# Patient Record
Sex: FEMALE | Race: WHITE | Employment: FULL TIME | URBAN - NONMETROPOLITAN AREA
[De-identification: names, ages, dates, MRNs, and addresses within clinical notes are randomized per-mention and may not be internally consistent; named-entity substitution may affect disease eponyms.]

---

## 2017-03-14 ENCOUNTER — HOSPITAL ENCOUNTER (EMERGENCY)
Age: 62
Discharge: HOME | End: 2017-03-14
Payer: OTHER MISCELLANEOUS

## 2017-03-14 ENCOUNTER — APPOINTMENT (OUTPATIENT)
Dept: GENERAL RADIOLOGY | Age: 62
End: 2017-03-14
Payer: OTHER MISCELLANEOUS

## 2017-03-14 ENCOUNTER — APPOINTMENT (OUTPATIENT)
Dept: CT IMAGING | Age: 62
End: 2017-03-14
Payer: OTHER MISCELLANEOUS

## 2017-03-14 VITALS
SYSTOLIC BLOOD PRESSURE: 137 MMHG | OXYGEN SATURATION: 96 % | RESPIRATION RATE: 18 BRPM | HEIGHT: 61 IN | TEMPERATURE: 97.6 F | DIASTOLIC BLOOD PRESSURE: 74 MMHG | WEIGHT: 151 LBS | BODY MASS INDEX: 28.51 KG/M2 | HEART RATE: 56 BPM

## 2017-03-14 DIAGNOSIS — S20.221A CONTUSION OF RIGHT BACK WALL OF THORAX, INITIAL ENCOUNTER: ICD-10-CM

## 2017-03-14 DIAGNOSIS — S43.401A SPRAIN OF RIGHT SHOULDER, UNSPECIFIED SHOULDER SPRAIN TYPE, INITIAL ENCOUNTER: Primary | ICD-10-CM

## 2017-03-14 DIAGNOSIS — S73.101A HIP SPRAIN, RIGHT, INITIAL ENCOUNTER: ICD-10-CM
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
## 2017-03-14 PROCEDURE — 72125 CT NECK SPINE W/O DYE: CPT

## 2017-03-14 PROCEDURE — 99284 EMERGENCY DEPT VISIT MOD MDM: CPT

## 2017-03-14 PROCEDURE — 6370000000 HC RX 637 (ALT 250 FOR IP): Performed by: NURSE PRACTITIONER

## 2017-03-14 PROCEDURE — 72072 X-RAY EXAM THORAC SPINE 3VWS: CPT

## 2017-03-14 PROCEDURE — 73030 X-RAY EXAM OF SHOULDER: CPT

## 2017-03-14 PROCEDURE — 73010 X-RAY EXAM OF SHOULDER BLADE: CPT

## 2017-03-14 PROCEDURE — 73502 X-RAY EXAM HIP UNI 2-3 VIEWS: CPT

## 2017-03-14 RX ORDER — NAPROXEN SODIUM 550 MG/1
550 TABLET ORAL ONCE
Status: COMPLETED | OUTPATIENT
Start: 2017-03-14 | End: 2017-03-14

## 2017-03-14 RX ORDER — NAPROXEN SODIUM 550 MG/1
550 TABLET ORAL 2 TIMES DAILY WITH MEALS
Qty: 10 TABLET | Refills: 0 | Status: SHIPPED | OUTPATIENT
Start: 2017-03-14 | End: 2021-04-01

## 2017-03-14 RX ORDER — CYCLOBENZAPRINE HCL 10 MG
10 TABLET ORAL ONCE
Status: COMPLETED | OUTPATIENT
Start: 2017-03-14 | End: 2017-03-14

## 2017-03-14 RX ORDER — CYCLOBENZAPRINE HCL 10 MG
10 TABLET ORAL 3 TIMES DAILY PRN
Qty: 15 TABLET | Refills: 0 | Status: SHIPPED | OUTPATIENT
Start: 2017-03-14 | End: 2017-03-19

## 2017-03-14 RX ADMIN — NAPROXEN SODIUM 550 MG: 550 TABLET ORAL at 21:16

## 2017-03-14 RX ADMIN — CYCLOBENZAPRINE HYDROCHLORIDE 10 MG: 10 TABLET, FILM COATED ORAL at 21:15

## 2017-03-14 ASSESSMENT — PAIN SCALES - GENERAL
PAINLEVEL_OUTOF10: 3
PAINLEVEL_OUTOF10: 2
PAINLEVEL_OUTOF10: 2

## 2017-03-14 ASSESSMENT — PAIN DESCRIPTION - INTENSITY
RATING_2: 4
RATING_3: 2

## 2017-03-14 ASSESSMENT — PAIN DESCRIPTION - LOCATION: LOCATION: HIP

## 2017-03-14 ASSESSMENT — PAIN DESCRIPTION - ORIENTATION: ORIENTATION: RIGHT

## 2017-03-14 ASSESSMENT — PAIN DESCRIPTION - PAIN TYPE: TYPE: ACUTE PAIN

## 2017-03-17 ASSESSMENT — ENCOUNTER SYMPTOMS
CONTUSION: 0
SHORTNESS OF BREATH: 0
ABDOMINAL PAIN: 0
BACK PAIN: 1
NAUSEA: 0
VOMITING: 0

## 2017-03-21 ENCOUNTER — HOSPITAL ENCOUNTER (OUTPATIENT)
Dept: GENERAL RADIOLOGY | Age: 62
Discharge: HOME | End: 2017-03-21
Payer: OTHER MISCELLANEOUS

## 2017-03-21 ENCOUNTER — HOSPITAL ENCOUNTER (OUTPATIENT)
Age: 62
Discharge: HOME | End: 2017-03-21
Payer: OTHER MISCELLANEOUS

## 2017-03-21 DIAGNOSIS — S50.01XA CONTUSION OF RIGHT ELBOW, INITIAL ENCOUNTER: ICD-10-CM

## 2017-03-21 DIAGNOSIS — M25.531 WRIST PAIN, ACUTE, RIGHT: ICD-10-CM

## 2017-03-21 DIAGNOSIS — M79.641 PAIN OF RIGHT HAND: ICD-10-CM

## 2017-03-21 PROCEDURE — 73080 X-RAY EXAM OF ELBOW: CPT

## 2017-03-21 PROCEDURE — 73110 X-RAY EXAM OF WRIST: CPT

## 2017-03-21 PROCEDURE — 73130 X-RAY EXAM OF HAND: CPT

## 2018-03-06 ENCOUNTER — HOSPITAL ENCOUNTER (OUTPATIENT)
Dept: WOMENS IMAGING | Age: 63
Discharge: HOME | End: 2018-03-08
Payer: COMMERCIAL

## 2018-03-06 DIAGNOSIS — Z12.31 SCREENING MAMMOGRAM, ENCOUNTER FOR: ICD-10-CM

## 2018-03-06 PROCEDURE — 77067 SCR MAMMO BI INCL CAD: CPT

## 2018-06-04 ENCOUNTER — HOSPITAL ENCOUNTER (OUTPATIENT)
Age: 63
Discharge: HOME | End: 2018-06-04
Payer: COMMERCIAL

## 2018-06-04 DIAGNOSIS — R30.0 BURNING WITH URINATION: ICD-10-CM

## 2018-06-04 PROCEDURE — 87086 URINE CULTURE/COLONY COUNT: CPT

## 2018-06-05 LAB
CULTURE: NORMAL
CULTURE: NORMAL
Lab: NORMAL
SPECIMEN DESCRIPTION: NORMAL
SPECIMEN DESCRIPTION: NORMAL
STATUS: NORMAL

## 2019-11-19 ENCOUNTER — HOSPITAL ENCOUNTER (OUTPATIENT)
Age: 64
Discharge: HOME | End: 2019-11-21
Payer: COMMERCIAL

## 2019-11-19 ENCOUNTER — HOSPITAL ENCOUNTER (OUTPATIENT)
Dept: GENERAL RADIOLOGY | Age: 64
Discharge: HOME | End: 2019-11-21
Payer: COMMERCIAL

## 2019-11-19 DIAGNOSIS — J20.9 ACUTE BRONCHITIS, UNSPECIFIED ORGANISM: ICD-10-CM

## 2019-11-19 PROCEDURE — 71046 X-RAY EXAM CHEST 2 VIEWS: CPT

## 2019-12-14 PROBLEM — Z12.11 COLON CANCER SCREENING: Status: ACTIVE | Noted: 2019-12-14

## 2020-01-13 PROBLEM — Z12.11 COLON CANCER SCREENING: Status: RESOLVED | Noted: 2019-12-14 | Resolved: 2020-01-13

## 2020-10-29 ENCOUNTER — HOSPITAL ENCOUNTER (OUTPATIENT)
Age: 65
Discharge: HOME | End: 2020-10-29
Payer: MEDICARE

## 2020-10-29 LAB
ALBUMIN SERPL-MCNC: 4.8 G/DL (ref 3.5–5.2)
ALBUMIN/GLOBULIN RATIO: 2.2 (ref 1–2.5)
ALP BLD-CCNC: 67 U/L (ref 35–104)
ALT SERPL-CCNC: 38 U/L (ref 5–33)
ANION GAP SERPL CALCULATED.3IONS-SCNC: 11 MMOL/L (ref 9–17)
AST SERPL-CCNC: 25 U/L
BILIRUB SERPL-MCNC: 0.55 MG/DL (ref 0.3–1.2)
BUN BLDV-MCNC: 20 MG/DL (ref 8–23)
BUN/CREAT BLD: 25 (ref 9–20)
CALCIUM SERPL-MCNC: 9.7 MG/DL (ref 8.6–10.4)
CHLORIDE BLD-SCNC: 99 MMOL/L (ref 98–107)
CHOLESTEROL/HDL RATIO: 4.3
CHOLESTEROL: 188 MG/DL
CO2: 28 MMOL/L (ref 20–31)
CREAT SERPL-MCNC: 0.8 MG/DL (ref 0.5–0.9)
ESTIMATED AVERAGE GLUCOSE: 128 MG/DL
GFR AFRICAN AMERICAN: >60 ML/MIN
GFR NON-AFRICAN AMERICAN: >60 ML/MIN
GFR SERPL CREATININE-BSD FRML MDRD: ABNORMAL ML/MIN/{1.73_M2}
GFR SERPL CREATININE-BSD FRML MDRD: ABNORMAL ML/MIN/{1.73_M2}
GLUCOSE BLD-MCNC: 104 MG/DL (ref 70–99)
HBA1C MFR BLD: 6.1 % (ref 4–6)
HCT VFR BLD CALC: 42.4 % (ref 36.3–47.1)
HDLC SERPL-MCNC: 44 MG/DL
HEMOGLOBIN: 14.2 G/DL (ref 11.9–15.1)
LDL CHOLESTEROL: 110 MG/DL (ref 0–130)
MCH RBC QN AUTO: 29.5 PG (ref 25.2–33.5)
MCHC RBC AUTO-ENTMCNC: 33.5 G/DL (ref 28.4–34.8)
MCV RBC AUTO: 88 FL (ref 82.6–102.9)
NRBC AUTOMATED: 0 PER 100 WBC
PDW BLD-RTO: 12.2 % (ref 11.8–14.4)
PLATELET # BLD: 206 K/UL (ref 138–453)
PMV BLD AUTO: 11 FL (ref 8.1–13.5)
POTASSIUM SERPL-SCNC: 4.3 MMOL/L (ref 3.7–5.3)
RBC # BLD: 4.82 M/UL (ref 3.95–5.11)
SODIUM BLD-SCNC: 138 MMOL/L (ref 135–144)
TOTAL PROTEIN: 7 G/DL (ref 6.4–8.3)
TRIGL SERPL-MCNC: 169 MG/DL
VLDLC SERPL CALC-MCNC: ABNORMAL MG/DL (ref 1–30)
WBC # BLD: 6.3 K/UL (ref 3.5–11.3)

## 2020-10-29 PROCEDURE — 36415 COLL VENOUS BLD VENIPUNCTURE: CPT

## 2020-10-29 PROCEDURE — 80061 LIPID PANEL: CPT

## 2020-10-29 PROCEDURE — 80053 COMPREHEN METABOLIC PANEL: CPT

## 2020-10-29 PROCEDURE — 83036 HEMOGLOBIN GLYCOSYLATED A1C: CPT

## 2020-10-29 PROCEDURE — 85027 COMPLETE CBC AUTOMATED: CPT

## 2020-11-19 ENCOUNTER — OFFICE VISIT (OUTPATIENT)
Dept: PULMONOLOGY | Age: 65
End: 2020-11-19
Payer: MEDICARE

## 2020-11-19 VITALS
BODY MASS INDEX: 29.62 KG/M2 | HEART RATE: 55 BPM | SYSTOLIC BLOOD PRESSURE: 136 MMHG | HEIGHT: 61 IN | OXYGEN SATURATION: 99 % | DIASTOLIC BLOOD PRESSURE: 72 MMHG | TEMPERATURE: 97.9 F | WEIGHT: 156.9 LBS | RESPIRATION RATE: 20 BRPM

## 2020-11-19 PROCEDURE — 1036F TOBACCO NON-USER: CPT | Performed by: INTERNAL MEDICINE

## 2020-11-19 PROCEDURE — G8427 DOCREV CUR MEDS BY ELIG CLIN: HCPCS | Performed by: INTERNAL MEDICINE

## 2020-11-19 PROCEDURE — G8484 FLU IMMUNIZE NO ADMIN: HCPCS | Performed by: INTERNAL MEDICINE

## 2020-11-19 PROCEDURE — G8400 PT W/DXA NO RESULTS DOC: HCPCS | Performed by: INTERNAL MEDICINE

## 2020-11-19 PROCEDURE — 99212 OFFICE O/P EST SF 10 MIN: CPT

## 2020-11-19 PROCEDURE — 3017F COLORECTAL CA SCREEN DOC REV: CPT | Performed by: INTERNAL MEDICINE

## 2020-11-19 PROCEDURE — 4040F PNEUMOC VAC/ADMIN/RCVD: CPT | Performed by: INTERNAL MEDICINE

## 2020-11-19 PROCEDURE — 1123F ACP DISCUSS/DSCN MKR DOCD: CPT | Performed by: INTERNAL MEDICINE

## 2020-11-19 PROCEDURE — 1090F PRES/ABSN URINE INCON ASSESS: CPT | Performed by: INTERNAL MEDICINE

## 2020-11-19 PROCEDURE — G8417 CALC BMI ABV UP PARAM F/U: HCPCS | Performed by: INTERNAL MEDICINE

## 2020-11-19 PROCEDURE — 99203 OFFICE O/P NEW LOW 30 MIN: CPT | Performed by: INTERNAL MEDICINE

## 2020-11-19 NOTE — PROGRESS NOTES
OUTPATIENT PULMONARY CONSULT NOTE      Patient:  Don Miranda  MRN: [id number]    Consulting Physician: Dr Shaheed Brunner  Reason for Consult: FABIEN  Primacy Care Physician: JUAN Cox CNP    HISTORY OF PRESENT ILLNESS:   The patient is a 72 y.o. female     She was diagnosed with obstructive sleep apnea approximately 10 years ago when she had been followed by Dr. Mahad Joaquin. Her last sleep study approximately was that time and she has been using CPAP almost 90% of the time according to patient no sleep data is available no sleep studies available no compliance data is available. According to patient she use CPAP with nasal pillows. She had history of snoring she was noted to have desaturation while she was in the hospital and she had awakening with a snoring and feeling of tiredness and fatigue during the daytime and that however she had a sleep study done and was diagnosed with sleep apnea. According to patient with a CPAP also she still feels like that she is tired and fatigue morning sleep is not safe refreshing, she does still wake up with a snoring and her  told her that she still have episodes of apnea she does not take nap during the daytime and usually she does not doze off during the daytime. She denies shortness of breath, wheezing chest tightness. She denies history of asthma or COPD. She is a non-smoker history of smoking only for 4 years remote history. Sleep questionnaire on CPAP  Snores at night, wakes herself snoring. Has witnessed apnea. Wakes up with choking and gasping sensation. Positive dry mouth upon awakening. Positive fatigue and tiredness during the day. Positive history of sleep apnea. Goes to sleep at 9-10 pm, wakes up 6 30 am. It takes 15 minutes to fall asleep. Wakes up 0-1 times at night to go to bathroom. Takes no nap during the day. No headache in am. No car wrecks or near wrecks because of the sleepiness. No nodding off while driving. No weight gain. Positive forgetfulness or decreased concentration. No nasal congestion or obstruction at night. No significant caffienated drinks or alcohol. Using CPAP 8 hr/night. No leg jerks during sleep. No restless feelings in legs at night. No numbness or burning in leg or feet. No leg aches or cramps . No loss of muscle strength when angry or laugh. No hallucination when dozing off or waking up from sleep. No paralysis upon awakening from sleep or going to sleep. No teeth grinding, nightmares, sleep walking. No night time panic attacks. No flowsheet data found. ESS:   Sitting and reading 1  Watching TV 1  Sitting inactive in a public place (e.g a theater or a meeting) 0  As a passenger in a car for an hour without a break 1  Lying down to rest in the afternoon when circumstances permit 1  Sitting and talking to someone 0  Sitting quietly after a lunch without alcohol 1  In a car, while stopped for a few minutes in traffic0    Past Medical History:        Diagnosis Date    Arthritis     Depression     Hyperlipidemia     Hypertension     Other and unspecified hyperlipidemia     Sleep apnea     Unspecified acute reaction to stress     Unspecified essential hypertension        Past Surgical History:        Procedure Laterality Date    HYSTERECTOMY      TUBAL LIGATION         Allergies:     Allergies   Allergen Reactions    Keflex [Cephalexin]      RASH    Sulfa Antibiotics          Home Meds:   Outpatient Encounter Medications as of 11/19/2020   Medication Sig Dispense Refill    hydroCHLOROthiazide (HYDRODIURIL) 50 MG tablet TAKE 1 TABLET DAILY 90 tablet 3    atenolol (TENORMIN) 25 MG tablet TAKE 1 TABLET DAILY 90 tablet 3    citalopram (CELEXA) 20 MG tablet TAKE 1 TABLET ONCE DAILY 90 tablet 3    omeprazole (PRILOSEC) 20 MG delayed release capsule TAKE 1 CAPSULE DAILY 90 capsule 3    simvastatin (ZOCOR) 40 MG tablet TAKE 1 TABLET EVERY EVENING 90 tablet 3    naproxen sodium (ANAPROX DS) 550 MG tablet Take 1 tablet by mouth 2 times daily (with meals) for 5 days (Patient taking differently: Take 550 mg by mouth 2 times daily as needed ) 10 tablet 0     No facility-administered encounter medications on file as of 11/19/2020. Social History:   TOBACCO:   reports that she quit smoking about 38 years ago. Her smoking use included cigarettes. She has a 6.00 pack-year smoking history. She has never used smokeless tobacco.  ETOH:   reports current alcohol use of about 4.0 standard drinks of alcohol per week.   OCCUPATION:      Family History:       Problem Relation Age of Onset    Other Mother         MANTEL CELL LYMPHOMA    Asthma Mother     Heart Attack Mother     High Blood Pressure Mother     High Cholesterol Mother     Stroke Mother     Thyroid Disease Mother     Kidney Disease Mother     Glaucoma Mother     Cancer Mother     Cancer Father         LUNG    Cancer Sister        Immunizations:    Immunization History   Administered Date(s) Administered    Td, unspecified formulation 11/29/2012         REVIEW OF SYSTEMS:  CONSTITUTIONAL:  negative for  fevers, chills, sweats, fatigue, anorexia, and weight loss  EYES:  negative for  double vision, blurred vision, dry eyes, eye discharge, visual disturbance, redness, and icterus  HEENT:  negative for  hearing loss, tinnitus, ear drainage, earaches, nasal congestion, epistaxis, sore throat, hoarseness, voice change, and postnasal drip  RESPIRATORY:  negative for  dry cough, cough with sputum, dyspnea, wheezing, hemoptysis, chest pain, and pleuritic pain  CARDIOVASCULAR:  negative for  chest pain, dyspnea, palpitations, orthopnea, PND, exertional chest pressure/discomfort, fatigue, edema, syncope  GASTROINTESTINAL:  negative for nausea, vomiting, diarrhea, constipation, abdominal pain, abdominal mass, abdominal distention, jaundice, dysphagia, reflux, odynophagia, hematemesis, and hemtochezia  GENITOURINARY:  negative for frequency, percussion, air entry is present bilaterally and symmetrical, no expiratory wheezing rhonchi or crackles.   Heart - normal rate, regular rhythm, normal S1, S2, no murmurs, rubs, clicks or gallops  Abdomen - soft, nontender, nondistended, no masses or organomegaly  Neurological - alert, oriented, normal speech, no focal findings or movement disorder noted}  Extremities - peripheral pulses normal, no pedal edema, no clubbing or cyanosis  Skin - normal coloration and turgor, no rashes, no suspicious skin lesions noted       LABS:    CBC:   WBC   Date Value Ref Range Status   10/29/2020 6.3 3.5 - 11.3 k/uL Final   12/26/2012 7.6 3.5 - 11.0 k/uL Final     Hemoglobin   Date Value Ref Range Status   10/29/2020 14.2 11.9 - 15.1 g/dL Final   12/26/2012 13.9 12.0 - 16.0 g/dL Final     Platelets   Date Value Ref Range Status   10/29/2020 206 138 - 453 k/uL Final   12/26/2012 208 140 - 450 k/uL Final     BMP:   Sodium   Date Value Ref Range Status   10/29/2020 138 135 - 144 mmol/L Final     Potassium   Date Value Ref Range Status   10/29/2020 4.3 3.7 - 5.3 mmol/L Final     Chloride   Date Value Ref Range Status   10/29/2020 99 98 - 107 mmol/L Final     CO2   Date Value Ref Range Status   10/29/2020 28 20 - 31 mmol/L Final     BUN   Date Value Ref Range Status   10/29/2020 20 8 - 23 mg/dL Final     CREATININE   Date Value Ref Range Status   10/29/2020 0.80 0.50 - 0.90 mg/dL Final     Glucose   Date Value Ref Range Status   10/29/2020 104 (H) 70 - 99 mg/dL Final   06/26/2018 95 70 - 99 mg/dL Final     Comment:         DIAGNOSTIC THRESHOLDS FOR DIABETES AND IMPAIRED FASTING GLUCOSE (IFG)                                        FASTING PLASMA GLUCOSE                NORMAL                       <100     mg/dL                IFG                          100-125  mg/dL                DIABETES                     >/= 126  mg/dL                GESTATIONAL DIABETES         >/= 92   mg/dL  Pathology [address] [facility], [address]  : HARSHA Loza Draft No. [id number]   CAP Accreditation No. [id number]     05/30/2017 98 70 - 100 mg/dl Final     Comment:           DIAGNOSTIC THRESHOLDS FOR DIABETES AND IMPAIRED FASTING GLUCOSE (IFG)                                        FASTING PLASMA GLUCOSE                NORMAL                       <100     MG/DL                IFG                          100-125  MG/DL                DIABETES                     >/= 126  MG/DL  Pathology [address] [place] [address]  : Eric Jones M.D.  CLIA No. [id number]   CAP Accreditation No. [id number]       Hepatic:   AST   Date Value Ref Range Status   10/29/2020 25 <32 U/L Final   12/17/2019 21 0 - 41 U/L Final     Comment:     Performed at [facility]. [facility]  [address] [place] [id number]     11/07/2018 20 9 - 40 U/L Final     ALT   Date Value Ref Range Status   10/29/2020 38 (H) 5 - 33 U/L Final   12/17/2019 25 0 - 31 U/L Final     Comment:     Performed at [facility]. [facility]  [address] [place] [id number]  Pathology [address] [place] [address]  CLIA No. [id number]   CAP Accreditation No. [id number]  : Anna Juarez. James Katz M.D.     11/07/2018 27 5 - 40 U/L Final     Comment:     Pathology [address] [place] [address]  : Gonzalo Juarez.  AHRSHA Griffin Draft No. [id number]   CAP Accreditation No. [id number]       Total Bilirubin   Date Value Ref Range Status   10/29/2020 0.55 0.3 - 1.2 mg/dL Final     Alkaline Phosphatase   Date Value Ref Range Status   10/29/2020 67 35 - 104 U/L Final     Amylase: No results found for: AMYLASE  Lipase: No results found for: LIPASE  CARDIAC ENZYMES: No results found for: CKTOTAL, CKMB, CKMBINDEX, TROPONINI  BNP: No results found for: BNP  Lipids:   Cholesterol   Date Value Ref Range Status   10/29/2020 188 <200 mg/dL Final Comment:        Cholesterol Guidelines:      <200  Desirable   200-240  Borderline      >240  Undesirable          HDL   Date Value Ref Range Status   10/29/2020 44 >40 mg/dL Final     Comment:        HDL Guidelines:    <40     Undesirable   40-59    Borderline    >59     Desirable            INR: No results found for: INR  Thyroid: No results found for: TSH  Urinalysis:   Blood, Urine   Date Value Ref Range Status   06/04/2018 Positive  Final     Comment:     moderate     Color, UA   Date Value Ref Range Status   06/04/2018 Light Yellow  Final     pH, UA   Date Value Ref Range Status   06/04/2018 7.0 4.5 - 8.0 Final     Protein, UA   Date Value Ref Range Status   06/04/2018 Negative Negative Final     Specific Gravity, UA   Date Value Ref Range Status   06/04/2018 1.005 1.005 - 1.030 Final     Bilirubin Urine   Date Value Ref Range Status   06/04/2018 0 mg/dL Final     Nitrite, Urine   Date Value Ref Range Status   06/04/2018 Negative  Final     Leukocytes, UA   Date Value Ref Range Status   06/04/2018 large  Final     Glucose, Ur   Date Value Ref Range Status   06/04/2018 neg  Final     Cultures:-  -----------------------------------------------------------------    ABGs: No results found for: PHART, PO2ART, [id number]    Pulmonary Functions Testing Results:    No results found for: FEV1, FVC, YAE6OAK, TLC, DLCO        Assessment and Plan       ICD-10-CM    1. FABIEN on CPAP  G47.33     Z99.89    2. Essential hypertension  I10              Sleep study baseline/titration study possibly a split-night study. We will give her a prescription for CPAP supplies to continue using CPAP but she has at this time.     Continue current CPAP at least 4 hrs qhs  Wt loss is recommended and discussed  Follow good sleep hygeine instructions  Use humidifier  Questions answered pertaining to diagnosis and management explained importance of compliance with therapy   Compliance data not available  Need compliance data before next

## 2020-12-31 ENCOUNTER — HOSPITAL ENCOUNTER (OUTPATIENT)
Dept: LAB | Age: 65
Setting detail: SPECIMEN
Discharge: HOME | End: 2020-12-31
Payer: MEDICARE

## 2020-12-31 DIAGNOSIS — Z01.818 PREOPERATIVE TESTING: ICD-10-CM

## 2020-12-31 PROCEDURE — C9803 HOPD COVID-19 SPEC COLLECT: HCPCS

## 2020-12-31 PROCEDURE — U0003 INFECTIOUS AGENT DETECTION BY NUCLEIC ACID (DNA OR RNA); SEVERE ACUTE RESPIRATORY SYNDROME CORONAVIRUS 2 (SARS-COV-2) (CORONAVIRUS DISEASE [COVID-19]), AMPLIFIED PROBE TECHNIQUE, MAKING USE OF HIGH THROUGHPUT TECHNOLOGIES AS DESCRIBED BY CMS-2020-01-R: HCPCS

## 2021-01-03 LAB
SARS-COV-2, RAPID: NORMAL
SARS-COV-2: NORMAL
SARS-COV-2: NOT DETECTED
SOURCE: NORMAL

## 2021-01-05 ENCOUNTER — HOSPITAL ENCOUNTER (OUTPATIENT)
Dept: WOMENS IMAGING | Age: 66
Discharge: HOME | End: 2021-01-07
Payer: MEDICARE

## 2021-01-05 DIAGNOSIS — Z12.31 ENCOUNTER FOR SCREENING MAMMOGRAM FOR MALIGNANT NEOPLASM OF BREAST: ICD-10-CM

## 2021-01-05 PROCEDURE — 77063 BREAST TOMOSYNTHESIS BI: CPT

## 2021-01-06 ENCOUNTER — HOSPITAL ENCOUNTER (OUTPATIENT)
Dept: SLEEP CENTER | Age: 66
Discharge: HOME | End: 2021-01-06
Payer: MEDICARE

## 2021-01-06 DIAGNOSIS — G47.33 OSA ON CPAP: ICD-10-CM

## 2021-01-06 DIAGNOSIS — Z99.89 OSA ON CPAP: ICD-10-CM

## 2021-01-06 PROCEDURE — 95811 POLYSOM 6/>YRS CPAP 4/> PARM: CPT

## 2021-01-08 LAB — STATUS: NORMAL

## 2021-04-01 ENCOUNTER — OFFICE VISIT (OUTPATIENT)
Dept: PULMONOLOGY | Age: 66
End: 2021-04-01
Payer: MEDICARE

## 2021-04-01 VITALS
DIASTOLIC BLOOD PRESSURE: 82 MMHG | BODY MASS INDEX: 29.85 KG/M2 | HEART RATE: 64 BPM | TEMPERATURE: 97.3 F | OXYGEN SATURATION: 96 % | WEIGHT: 158.1 LBS | RESPIRATION RATE: 20 BRPM | HEIGHT: 61 IN | SYSTOLIC BLOOD PRESSURE: 123 MMHG

## 2021-04-01 DIAGNOSIS — I10 ESSENTIAL HYPERTENSION: ICD-10-CM

## 2021-04-01 DIAGNOSIS — G47.33 OSA ON CPAP: Primary | ICD-10-CM

## 2021-04-01 DIAGNOSIS — Z99.89 OSA ON CPAP: Primary | ICD-10-CM

## 2021-04-01 PROCEDURE — G8400 PT W/DXA NO RESULTS DOC: HCPCS | Performed by: INTERNAL MEDICINE

## 2021-04-01 PROCEDURE — 99213 OFFICE O/P EST LOW 20 MIN: CPT | Performed by: INTERNAL MEDICINE

## 2021-04-01 PROCEDURE — G8427 DOCREV CUR MEDS BY ELIG CLIN: HCPCS | Performed by: INTERNAL MEDICINE

## 2021-04-01 PROCEDURE — 4040F PNEUMOC VAC/ADMIN/RCVD: CPT | Performed by: INTERNAL MEDICINE

## 2021-04-01 PROCEDURE — 1090F PRES/ABSN URINE INCON ASSESS: CPT | Performed by: INTERNAL MEDICINE

## 2021-04-01 PROCEDURE — 1036F TOBACCO NON-USER: CPT | Performed by: INTERNAL MEDICINE

## 2021-04-01 PROCEDURE — 1123F ACP DISCUSS/DSCN MKR DOCD: CPT | Performed by: INTERNAL MEDICINE

## 2021-04-01 PROCEDURE — 3017F COLORECTAL CA SCREEN DOC REV: CPT | Performed by: INTERNAL MEDICINE

## 2021-04-01 PROCEDURE — G8417 CALC BMI ABV UP PARAM F/U: HCPCS | Performed by: INTERNAL MEDICINE

## 2021-04-01 NOTE — PROGRESS NOTES
OUTPATIENT PULMONARY PROGRESS NOTE      Patient:  David Villagran  MRN: [id number]    Consulting Physician: Dr Holley Bell  Reason for Consult: FABIEN  Primacy Care Physician: JUAN Warren CNP    HISTORY OF PRESENT ILLNESS:   The patient is a 72 y.o. female for follow-up of sleep apnea    She is here for follow-up of sleep apnea. She was seen for the first time 3 months ago. She was scheduled to have a repeat sleep study done. She had a split-night study done she had an AHI of little over 14 and she was titrated to a CPAP of 6 cm during the split-night study. According to patient she has problem adjusting the nasal pillows she has 2 different kind of nasal pillows she sleeps on side when she sleeps on the side sleeps and she is problem adjusting to sleep. She does not think that she is getting good sleep although she does not wake up a lot but when she wake up in the morning she does feel tired. She also think that pressure is too low and she does not feel like pressure going. She does not otherwise feel tired during the daytime. She does not take nap during the daytime. She was still noted some time by her  to have snoring but denies waking up with snoring gasping or choking. Compliance data is available from 01/30/2021 to 02/28/2021. Compliance is 100% for 30 days. Average usage is 8 hours and 45 minutes and average AHI is 3.3 on CPAP of 6 cm. Sleep questionnaire on CPAP on 04/01/2021  Occasional snoring at night, does not wakes herself snoring. Has no witnessed apnea. Does wakes up with choking and gasping sensation. Positive dry mouth upon awakening. Negative fatigue and tiredness during the day. Positive history of sleep apnea. Goes to sleep at 10 pm, wakes up 630 am. It takes 15 to 30 minutes to fall asleep. Does not wake up at night to go to bathroom. Takes no nap during the day. No headache in am. No car wrecks or near wrecks because of the sleepiness.  No nodding off while driving. No weight gain. Positive forgetfulness or decreased concentration. No nasal congestion or obstruction at night. No significant caffienated drinks or alcohol. Using CPAP 8 hr/night. No leg jerks during sleep. No restless feelings in legs at night. No numbness or burning in leg or feet. No leg aches or cramps . Initial history and evaluation  She was diagnosed with obstructive sleep apnea approximately 10 years ago when she had been followed by Dr. Sarai Castillo. Her last sleep study approximately was that time and she has been using CPAP almost 90% of the time according to patient no sleep data is available no sleep studies available no compliance data is available. According to patient she use CPAP with nasal pillows. She had history of snoring she was noted to have desaturation while she was in the hospital and she had awakening with a snoring and feeling of tiredness and fatigue during the daytime and that however she had a sleep study done and was diagnosed with sleep apnea. According to patient with a CPAP also she still feels like that she is tired and fatigue morning sleep is not safe refreshing, she does still wake up with a snoring and her  told her that she still have episodes of apnea she does not take nap during the daytime and usually she does not doze off during the daytime. She denies shortness of breath, wheezing chest tightness. She denies history of asthma or COPD. She is a non-smoker history of smoking only for 4 years remote history. No flowsheet data found.     ESS: 6  Sitting and reading 1  Watching TV 2  Sitting inactive in a public place (e.g a theater or a meeting) 0  As a passenger in a car for an hour without a break 3  Lying down to rest in the afternoon when circumstances permit 0  Sitting and talking to someone 0  Sitting quietly after a lunch without alcohol 0  In a car, while stopped for a few minutes in traffic0    Past Medical History:        Diagnosis Date    Arthritis     Depression     Hyperlipidemia     Hypertension     Other and unspecified hyperlipidemia     Sleep apnea     Unspecified acute reaction to stress     Unspecified essential hypertension        Past Surgical History:        Procedure Laterality Date    HYSTERECTOMY      TUBAL LIGATION         Allergies: Allergies   Allergen Reactions    Keflex [Cephalexin]      RASH    Sulfa Antibiotics          Home Meds:   Outpatient Encounter Medications as of 4/1/2021   Medication Sig Dispense Refill    hydroCHLOROthiazide (HYDRODIURIL) 50 MG tablet TAKE 1 TABLET DAILY 90 tablet 3    atenolol (TENORMIN) 25 MG tablet TAKE 1 TABLET DAILY 90 tablet 3    citalopram (CELEXA) 20 MG tablet TAKE 1 TABLET ONCE DAILY 90 tablet 3    omeprazole (PRILOSEC) 20 MG delayed release capsule TAKE 1 CAPSULE DAILY 90 capsule 3    simvastatin (ZOCOR) 40 MG tablet TAKE 1 TABLET EVERY EVENING 90 tablet 3    naproxen sodium (ANAPROX DS) 550 MG tablet Take 1 tablet by mouth 2 times daily (with meals) for 5 days (Patient not taking: Reported on 4/1/2021) 10 tablet 0     No facility-administered encounter medications on file as of 4/1/2021. Social History:   TOBACCO:   reports that she quit smoking about 39 years ago. Her smoking use included cigarettes. She has a 6.00 pack-year smoking history. She has never used smokeless tobacco.  ETOH:   reports current alcohol use of about 4.0 standard drinks of alcohol per week.   OCCUPATION:      Family History:       Problem Relation Age of Onset    Other Mother         MANTEL CELL LYMPHOMA    Asthma Mother     Heart Attack Mother     High Blood Pressure Mother     High Cholesterol Mother    Brandi Yael Stroke Mother     Thyroid Disease Mother     Kidney Disease Mother     Glaucoma Mother     Cancer Mother     Cancer Father         LUNG    Cancer Sister        Immunizations:    Immunization History   Administered Date(s) Administered    Td, unspecified formulation 11/29/2012         REVIEW OF SYSTEMS:  CONSTITUTIONAL:  negative for  fevers, chills, sweats, fatigue, anorexia, and weight loss  EYES:  negative for  double vision, blurred vision, dry eyes, eye discharge, visual disturbance, redness, and icterus  HEENT:  negative for  hearing loss, tinnitus, ear drainage, earaches, nasal congestion, epistaxis, sore throat, hoarseness, voice change, and postnasal drip  RESPIRATORY:  negative for  dry cough, cough with sputum, dyspnea, wheezing, hemoptysis, chest pain, and pleuritic pain  CARDIOVASCULAR:  negative for  chest pain, dyspnea, palpitations, orthopnea, PND, exertional chest pressure/discomfort, fatigue, edema, syncope  GASTROINTESTINAL:  negative for nausea, vomiting, diarrhea, constipation, abdominal pain, abdominal mass, abdominal distention, jaundice, dysphagia, reflux, odynophagia, hematemesis, and hemtochezia  GENITOURINARY:  negative for frequency, dysuria, nocturia, and hematuria  HEMATOLOGIC/LYMPHATIC:  negative for easy bruising, bleeding, lymphadenopathy, and petechiae  ALLERGIC/IMMUNOLOGIC:  negative for recurrent infections, urticaria, hay fever, angioedema, anaphylaxis, and drug reactions  ENDOCRINE:  negative for heat intolerance, cold intolerance, tremor, and weight changes  MUSCULOSKELETAL:  negative for  myalgias, arthralgias, joint swelling, stiff joints, and muscle weakness  NEUROLOGICAL:  negative for headaches, dizziness, seizures, memory problems, speech problems, visual disturbance, gait problems, tremor, dysphagia, weakness, numbness, syncope, and tingling  BEHAVIOR/PSYCH:  negative for decreased sleep, decreased energy level, increased energy level, poor concentration, depressed mood, and anxiety          Physical Exam:    Vitals: /82 (Site: Left Upper Arm, Position: Sitting, Cuff Size: Medium Adult)   Pulse 64   Temp 97.3 °F (36.3 °C) (Tympanic)   Resp 20   Ht 5' 1\" (1.549 m)   Wt 158 lb 1.6 oz (71.7 kg)   SpO2 96%   BMI 29.87 kg/m²   Last 3 weights: Wt Readings from Last 3 Encounters:   04/01/21 158 lb 1.6 oz (71.7 kg)   11/19/20 156 lb 14.4 oz (71.2 kg)   10/29/20 155 lb 8 oz (70.5 kg)     Body mass index is 29.87 kg/m². Physical Examination:   General appearance - alert, well appearing, and in no distress, normal appearing weight and acyanotic, in no respiratory distress  Mental status - alert, oriented to person, place, and time  Eyes - pupils equal and reactive, extraocular eye movements intact, sclera anicteric, left eye normal, right eye normal  Ears - right ear normal, left ear normal  Nose - normal and patent, no erythema, discharge or polyps  Mouth - mucous membranes moist, pharynx normal without lesions and small oropharynx, small chin, large tongue, Mallampati 3  Neck - supple, no significant adenopathy  Chest - no tachypnea, retractions or cyanosis bilateral symmetrical chest movement, normal resonance on percussion, air entry is present bilaterally and symmetrical, no expiratory wheezing rhonchi or crackles.   Heart - normal rate, regular rhythm, normal S1, S2, no murmurs, rubs, clicks or gallops  Abdomen - soft, nontender, nondistended, no masses or organomegaly  Neurological - alert, oriented, normal speech, no focal findings or movement disorder noted}  Extremities - peripheral pulses normal, no pedal edema, no clubbing or cyanosis  Skin - normal coloration and turgor, no rashes, no suspicious skin lesions noted       LABS:    CBC:   WBC   Date Value Ref Range Status   10/29/2020 6.3 3.5 - 11.3 k/uL Final   12/26/2012 7.6 3.5 - 11.0 k/uL Final     Hemoglobin   Date Value Ref Range Status   10/29/2020 14.2 11.9 - 15.1 g/dL Final   12/26/2012 13.9 12.0 - 16.0 g/dL Final     Platelets   Date Value Ref Range Status   10/29/2020 206 138 - 453 k/uL Final   12/26/2012 208 140 - 450 k/uL Final     BMP:   Sodium   Date Value Ref Range Status   10/29/2020 138 135 - 144 mmol/L Final     Potassium   Date Value Ref Range Status   10/29/2020 4.3 3.7 - 5.3 mmol/L Final     Chloride   Date Value Ref Range Status   10/29/2020 99 98 - 107 mmol/L Final     CO2   Date Value Ref Range Status   10/29/2020 28 20 - 31 mmol/L Final     BUN   Date Value Ref Range Status   10/29/2020 20 8 - 23 mg/dL Final     CREATININE   Date Value Ref Range Status   10/29/2020 0.80 0.50 - 0.90 mg/dL Final     Glucose   Date Value Ref Range Status   10/29/2020 104 (H) 70 - 99 mg/dL Final   06/26/2018 95 70 - 99 mg/dL Final     Comment:         DIAGNOSTIC THRESHOLDS FOR DIABETES AND IMPAIRED FASTING GLUCOSE (IFG)                                        FASTING PLASMA GLUCOSE                NORMAL                       <100     mg/dL                IFG                          100-125  mg/dL                DIABETES                     >/= 126  mg/dL                GESTATIONAL DIABETES         >/= 92   mg/dL  Pathology [address] [place] [address], [address] [place] [address]  : HARSHA Amaya No. [id number]   CAP Accreditation No. [id number]     05/30/2017 98 70 - 100 mg/dl Final     Comment:           DIAGNOSTIC THRESHOLDS FOR DIABETES AND IMPAIRED FASTING GLUCOSE (IFG)                                        FASTING PLASMA GLUCOSE                NORMAL                       <100     MG/DL                IFG                          100-125  MG/DL                DIABETES                     >/= 126  MG/DL  Pathology [address] [place] [address], [address] [place] [address]  : HARSHA Angulo No. [id number]   CAP Accreditation No. [id number]       Hepatic:   AST   Date Value Ref Range Status   10/29/2020 25 <32 U/L Final   12/17/2019 21 0 - 41 U/L Final     Comment:     Performed at [facility]. [facility]  [address] [place] [id number]     11/07/2018 20 9 - 40 U/L Final     ALT   Date Value Ref Range Status   10/29/2020 38 (H) 5 - 33 U/L Final   12/17/2019 25 0 - 31 U/L Final     Comment: Performed at [address] [place] [address]. [facility]  [address] [place] [address]  Pathology [facility], [address] [place] [address]  CLIA No. [id number]   CAP Accreditation No. [id number]  : Leonardo Jimenez. Mina Greenfield M.D.     11/07/2018 27 5 - 40 U/L Final     Comment:     Pathology [facility], [address] [place] [address]  : Slim Arroyo. Osbaldo Paez M.D.  Dorathy Fail No. [id number]   CAP Accreditation No. [id number]       Total Bilirubin   Date Value Ref Range Status   10/29/2020 0.55 0.3 - 1.2 mg/dL Final     Alkaline Phosphatase   Date Value Ref Range Status   10/29/2020 67 35 - 104 U/L Final     Amylase: No results found for: AMYLASE  Lipase: No results found for: LIPASE  CARDIAC ENZYMES: No results found for: CKTOTAL, CKMB, CKMBINDEX, TROPONINI  BNP: No results found for: BNP  Lipids:       INR: No results found for: INR  Thyroid: No results found for: TSH  Urinalysis:     Cultures:-  -----------------------------------------------------------------    ABGs: No results found for: PHART, PO2ART, SVG9AXL    Pulmonary Functions Testing Results:    No results found for: FEV1, FVC, NDF1INS, TLC, DLCO        Assessment and Plan       ICD-10-CM    1. FABIEN on CPAP  G47.33     Z99.89    2. Essential hypertension  I10          Assessment:    Sleep study reviewed and she had borderline moderate obstructive sleep apnea as AHI was 14 on a split-night study she was titrated to a CPAP of 6 cm. I think her CPAP pressure is too low. Will increase CPAP pressure to 8 cm. Compliance data seen and she is very compliant with CPAP. She is also having problem with nasal pillows. I wrote a prescription for CPAP mask and she will go to the [facility] and see if she can get a better fitted nasal pillows or CPAP mask. She is compliant with CPAP and she is getting benefit with use of CPAP clinically although she is having problem with the mask/nasal pillows.   Advised strongly to continue CPAP will increase pressure to 8 cm    Plan and recommendation:    Increase CPAP pressure to 8 cm  Rx for oronasal mask to try. Continue current CPAP at least 4 hrs qhs  Wt loss is recommended and discussed  Follow good sleep hygeine instructions  Use humidifier  Questions answered pertaining to diagnosis and management explained importance of compliance with therapy   Compliance data not available  Need compliance data before next visit. Refused flu vaccine  Vaccinations recommended  Up to date with vaccinations from [facility] an active lifestyle     We will up in office in 6 months. Please note that this chart was generated using voice recognition Dragon dictation software. Although every effort was made to ensure the accuracy of this automated transcription, some errors in transcription may have occurred. It was my pleasure to evaluate Fanyaminta Rm today. Please call with questions.     Barbara Jackson MD             4/1/2021, 9:47 AM

## 2021-04-01 NOTE — PATIENT INSTRUCTIONS
SURVEY:    You may be receiving a survey from [employer] regarding your visit today. Please complete the survey to enable us to provide the highest quality of care to you and your family. If you cannot score us a very good on any question, please call the office to discuss how we could have made your experience a very good one. Thank you.

## 2021-04-20 ENCOUNTER — TELEPHONE (OUTPATIENT)
Dept: PULMONOLOGY | Age: 66
End: 2021-04-20

## 2021-04-20 NOTE — TELEPHONE ENCOUNTER
[employer] the [employer] needs an addendum to the 4/1/2021 note to state the patient is benefiting from the use of her cpap machine umesh though she is having troubles with her face mask. Please advise, otherwise Medicare will not approve her machine.

## 2021-04-29 ENCOUNTER — HOSPITAL ENCOUNTER (OUTPATIENT)
Age: 66
Discharge: HOME | End: 2021-04-29
Payer: MEDICARE

## 2021-04-29 DIAGNOSIS — R73.9 HYPERGLYCEMIA: ICD-10-CM

## 2021-04-29 DIAGNOSIS — I10 ESSENTIAL HYPERTENSION: ICD-10-CM

## 2021-04-29 LAB
ALBUMIN SERPL-MCNC: 4.5 G/DL (ref 3.5–5.2)
ALBUMIN/GLOBULIN RATIO: 1.7 (ref 1–2.5)
ALP BLD-CCNC: 68 U/L (ref 35–104)
ALT SERPL-CCNC: 35 U/L (ref 5–33)
ANION GAP SERPL CALCULATED.3IONS-SCNC: 11 MMOL/L (ref 9–17)
AST SERPL-CCNC: 23 U/L
BILIRUB SERPL-MCNC: 0.37 MG/DL (ref 0.3–1.2)
BUN BLDV-MCNC: 14 MG/DL (ref 8–23)
BUN/CREAT BLD: 19 (ref 9–20)
CALCIUM SERPL-MCNC: 9.9 MG/DL (ref 8.6–10.4)
CHLORIDE BLD-SCNC: 102 MMOL/L (ref 98–107)
CO2: 29 MMOL/L (ref 20–31)
CREAT SERPL-MCNC: 0.75 MG/DL (ref 0.5–0.9)
ESTIMATED AVERAGE GLUCOSE: 117 MG/DL
GFR AFRICAN AMERICAN: >60 ML/MIN
GFR NON-AFRICAN AMERICAN: >60 ML/MIN
GFR SERPL CREATININE-BSD FRML MDRD: ABNORMAL ML/MIN/{1.73_M2}
GFR SERPL CREATININE-BSD FRML MDRD: ABNORMAL ML/MIN/{1.73_M2}
GLUCOSE BLD-MCNC: 112 MG/DL (ref 70–99)
HBA1C MFR BLD: 5.7 % (ref 4–6)
POTASSIUM SERPL-SCNC: 4.2 MMOL/L (ref 3.7–5.3)
SODIUM BLD-SCNC: 142 MMOL/L (ref 135–144)
TOTAL PROTEIN: 7.1 G/DL (ref 6.4–8.3)

## 2021-04-29 PROCEDURE — 83036 HEMOGLOBIN GLYCOSYLATED A1C: CPT

## 2021-04-29 PROCEDURE — 36415 COLL VENOUS BLD VENIPUNCTURE: CPT

## 2021-04-29 PROCEDURE — 80053 COMPREHEN METABOLIC PANEL: CPT

## 2021-05-10 NOTE — TELEPHONE ENCOUNTER
Faxed the revised note to [employer] as directed. Called and informed the patient that the new note was sent.

## 2021-05-19 ENCOUNTER — HOSPITAL ENCOUNTER (OUTPATIENT)
Dept: WOMENS IMAGING | Age: 66
Discharge: HOME | End: 2021-05-21
Payer: MEDICARE

## 2021-05-19 DIAGNOSIS — N95.1 POST MENOPAUSAL SYNDROME: ICD-10-CM

## 2021-05-19 PROCEDURE — 77080 DXA BONE DENSITY AXIAL: CPT

## 2021-07-06 ENCOUNTER — OFFICE VISIT (OUTPATIENT)
Dept: SURGERY | Age: 66
End: 2021-07-06
Payer: MEDICARE

## 2021-07-06 VITALS
HEART RATE: 64 BPM | TEMPERATURE: 98 F | SYSTOLIC BLOOD PRESSURE: 150 MMHG | WEIGHT: 167 LBS | DIASTOLIC BLOOD PRESSURE: 74 MMHG | BODY MASS INDEX: 31.55 KG/M2

## 2021-07-06 DIAGNOSIS — K21.9 GASTROESOPHAGEAL REFLUX DISEASE, UNSPECIFIED WHETHER ESOPHAGITIS PRESENT: ICD-10-CM

## 2021-07-06 DIAGNOSIS — Z12.11 ENCOUNTER FOR SCREENING COLONOSCOPY: Primary | ICD-10-CM

## 2021-07-06 DIAGNOSIS — Z01.818 PRE-OP TESTING: Primary | ICD-10-CM

## 2021-07-06 PROCEDURE — 99999 PR OFFICE/OUTPT VISIT,PROCEDURE ONLY: CPT | Performed by: SURGERY

## 2021-09-07 NOTE — PROGRESS NOTES
Vinny Romano MD  General Surgery, Endoscopy  Chief Medical Officer    [facility]  [address] [place] [id number]  Dept: [phone]  Fax: [address]  Chief Complaint   Patient presents with    New Patient     colonoscopy consult-screening. Patient denies symptoms. Last scope 10+ years ago-unsure of physician. Denies hx polyps, denies family hx colon cancer. Referred by Carmel Muhammad CNP. Denies positve COVID, no vaccine. HPI    Ms Perry Calvillo is a pleasant 51-year-old white female kindly referred to me by Carmel Muhammad CNP for screening colonoscopy. She has no GI complaints. No abdominal pain. Epigastric discomfort with reflux symptoms are completely managed with Prilosec. Normal appetite. Daily bowel movements, formed and brown, without blood. Colonoscopy 10 years ago without polyps, as she recalls. No recent weight changes, 167 pounds, BMI 32. No cough, fever nor other respiratory symptoms. No history of COVID-19, no vaccination. Mother treated for mantle cell lymphoma, father with lung cancer. Patient quit tobacco 1982.     Review of Systems    Past Medical History:   Diagnosis Date    Arthritis     Depression     Hyperlipidemia     Hypertension     Other and unspecified hyperlipidemia     Sleep apnea     Unspecified acute reaction to stress     Unspecified essential hypertension        Past Surgical History:   Procedure Laterality Date    HYSTERECTOMY      TUBAL LIGATION         Family History   Problem Relation Age of Onset    Other Mother         MANTEL CELL LYMPHOMA    Asthma Mother     Heart Attack Mother     High Blood Pressure Mother     High Cholesterol Mother    Unk Fujisawa Stroke Mother     Thyroid Disease Mother     Kidney Disease Mother     Glaucoma Mother     Cancer Mother     Cancer Father         LUNG    Cancer Sister        Allergies:  See list    Current Outpatient Medications   Medication Sig Dispense Refill    atenolol (TENORMIN) 25 MG tablet TAKE 1 TABLET DAILY 90 tablet 1    citalopram (CELEXA) 20 MG tablet TAKE 1 TABLET ONCE DAILY 90 tablet 1    hydroCHLOROthiazide (HYDRODIURIL) 50 MG tablet TAKE 1 TABLET DAILY 90 tablet 1    omeprazole (PRILOSEC) 20 MG delayed release capsule TAKE 1 CAPSULE DAILY 90 capsule 1    simvastatin (ZOCOR) 40 MG tablet TAKE 1 TABLET EVERY EVENING 90 tablet 1     No current facility-administered medications for this visit. Social History     Socioeconomic History    Marital status:      Spouse name: Not on file    Number of children: Not on file    Years of education: Not on file    Highest education level: Not on file   Occupational History    Not on file   Tobacco Use    Smoking status: Former Smoker     Packs/day: 1.00     Years: 6.00     Pack years: 6.00     Types: Cigarettes     Quit date:      Years since quittin.7    Smokeless tobacco: Never Used   Vaping Use    Vaping Use: Never used   Substance and Sexual Activity    Alcohol use: Yes     Alcohol/week: 4.0 standard drinks     Types: 4 Cans of beer per week     Comment: weekly    Drug use: No    Sexual activity: Yes     Partners: Male   Other Topics Concern    Not on file   Social History Narrative    Not on file     Social Determinants of Health     Financial Resource Strain:     Difficulty of Paying Living Expenses:    Food Insecurity:     Worried About Running Out of Food in the Last Year:     [address] in the Last Year:    Transportation Needs:     Lack of Transportation (Medical):      Lack of Transportation (Non-Medical):    Physical Activity:     Days of Exercise per Week:     Minutes of Exercise per Session:    Stress:     Feeling of Stress :    Social Connections:     Frequency of Communication with Friends and Family:     Frequency of Social Gatherings with Friends and Family:     Attends [employer] Services:     Active Member of Clubs or Organizations:     Attends Club or Organization Meetings:     Marital Status:    Intimate Partner Violence:     Fear of Current or Ex-Partner:     Emotionally Abused:     Physically Abused:     Sexually Abused:        BP (!) 150/74   Pulse 64   Temp 98 °F (36.7 °C)   Wt 167 lb (75.8 kg)   LMP  (LMP Unknown)   BMI 31.55 kg/m²      Physical Exam    IMAGING/LABS    10/29/2020 10:33 AM - Harshad, Nelsy Incoming Lab Results From [facility]    Component Value Ref Range & Units Status Collected Lab   WBC 6.3  3.5 - 11.3 k/uL Final 10/29/2020 10:00 AM [facility]   RBC 4.82  3.95 - 5.11 m/uL Final 10/29/2020 10:00 AM [facility]   Hemoglobin 14.2  11.9 - 15.1 g/dL Final 10/29/2020 10:00 AM [facility]   Hematocrit 42.4  36.3 - 47.1 % Final 10/29/2020 10:00 AM [facility]   MCV 88.0  82.6 - 102.9 fL Final 10/29/2020 10:00 AM [facility]   MCH 29.5  25.2 - 33.5 pg Final 10/29/2020 10:00 AM [facility]   MCHC 33.5  28.4 - 34.8 g/dL Final 10/29/2020 10:00 AM [facility]   RDW 12.2  11.8 - 14.4 % Final 10/29/2020 10:00 AM [facility]   Platelets 345  289 - 453 k/uL Final 10/29/2020 10:00 AM [facility]   MPV 11.0  8.1 - 13.5 fL Final 10/29/2020 10:00 AM [facility]   NRBC Automated 0.0  0.0 per 100 WBC Final 10/29/2020 10:00 AM [address] Performed By    Alysia Mandujano Name Director Address Valid Date Range   [id number] [facility] [address]  [place].   [facility] [address] 08/30/17 0802-12/16/20 1133   Lab and Collection    CBC - 10/29/2020  Result History        ASSESSMENT     Diagnosis Orders   1. Encounter for screening colonoscopy     2. Gastroesophageal reflux disease, unspecified whether esophagitis present     3. BMI 31.0-31.9,adult         PLAN    We will proceed with colonoscopy in October.   Given her history of reflux symptoms currently well managed with Prilosec, EGD has been offered. Patient will consider. Discussed alternative of discontinuing acid blockade for 3 to 4 weeks to evaluate for symptom recurrence. Risks, benefits, alternatives thoroughly reviewed and accepted by Ms. Olmedo including GI bleeding, perforation, missed lesions, COVID-19 exposure/infection, etc.  Discussed importance of a healthy, balanced high-fiber low-fat diet with fiber supplementation, increased water intake, decreased calories and sugar, etc.     Rogerio Velázquez MD

## 2021-09-07 NOTE — PATIENT INSTRUCTIONS
Patient Education        Learning About Colonoscopy  What is a colonoscopy? A colonoscopy is a test (also called a procedure) that lets a doctor look inside your large intestine. The doctor uses a thin, lighted tube called a colonoscope. The doctor uses it to look for small growths called polyps, colon or rectal cancer (colorectal cancer), or other problems like bleeding. During the procedure, the doctor can take samples of tissue. The samples can then be checked for cancer or other conditions. The doctor can also take out polyps. How is a colonoscopy done? This procedure is done in a doctor's office or a clinic or hospital. You will get medicine to help you relax and not feel pain. Some people find that they don't remember having the test because of the medicine. The doctor gently moves the colonoscope, or scope, through the colon. The scope is also a small video camera. It lets the doctor see the colon and take pictures. How do you prepare for the procedure? You need to clean out your colon before the procedure so the doctor can see your colon. This depends on which \"colon prep\" your doctor recommends. To clean out your colon, you'll do a \"colon prep\" before the test. This means you stop eating solid foods and drink only clear liquids. You can have water, tea, coffee, clear juices, clear broths, flavored ice pops, and gelatin (such as Jell-O). Do not drink anything red or purple. The day or night before the procedure, you drink a large amount of a special liquid. This causes loose, frequent stools. You will go to the bathroom a lot. Your doctor may have you drink part of the liquid the evening before and the rest on the day of the test. It's very important to drink all of the liquid. If you have problems drinking it, call your doctor. Arrange to have someone take you home after the test.  What can you expect after a colonoscopy?   Your doctor will tell you when you can eat and do your usual

## 2021-10-22 ENCOUNTER — HOSPITAL ENCOUNTER (OUTPATIENT)
Age: 66
Discharge: HOME | End: 2021-10-22
Payer: MEDICARE

## 2021-10-22 ENCOUNTER — HOSPITAL ENCOUNTER (OUTPATIENT)
Dept: PREADMISSION TESTING | Age: 66
Setting detail: SPECIMEN
Discharge: HOME | End: 2021-10-26
Payer: MEDICARE

## 2021-10-22 DIAGNOSIS — E78.2 MIXED HYPERLIPIDEMIA: ICD-10-CM

## 2021-10-22 DIAGNOSIS — Z01.818 PRE-OP TESTING: ICD-10-CM

## 2021-10-22 DIAGNOSIS — I10 ESSENTIAL HYPERTENSION: ICD-10-CM

## 2021-10-22 DIAGNOSIS — Z01.818 PREOP TESTING: Primary | ICD-10-CM

## 2021-10-22 DIAGNOSIS — R79.9 ABNORMAL FINDING OF BLOOD CHEMISTRY, UNSPECIFIED: ICD-10-CM

## 2021-10-22 LAB
ALBUMIN SERPL-MCNC: 4.6 G/DL (ref 3.5–5.2)
ALBUMIN/GLOBULIN RATIO: 1.8 (ref 1–2.5)
ALP BLD-CCNC: 68 U/L (ref 35–104)
ALT SERPL-CCNC: 25 U/L (ref 5–33)
ANION GAP SERPL CALCULATED.3IONS-SCNC: 12 MMOL/L (ref 9–17)
AST SERPL-CCNC: 18 U/L
BILIRUB SERPL-MCNC: 0.35 MG/DL (ref 0.3–1.2)
BUN BLDV-MCNC: 21 MG/DL (ref 8–23)
BUN/CREAT BLD: 25 (ref 9–20)
CALCIUM SERPL-MCNC: 9.3 MG/DL (ref 8.6–10.4)
CHLORIDE BLD-SCNC: 100 MMOL/L (ref 98–107)
CHOLESTEROL/HDL RATIO: 4.2
CHOLESTEROL: 185 MG/DL
CO2: 27 MMOL/L (ref 20–31)
CREAT SERPL-MCNC: 0.83 MG/DL (ref 0.5–0.9)
EKG ATRIAL RATE: 63 BPM
EKG P AXIS: 49 DEGREES
EKG P-R INTERVAL: 180 MS
EKG Q-T INTERVAL: 408 MS
EKG QRS DURATION: 86 MS
EKG QTC CALCULATION (BAZETT): 417 MS
EKG R AXIS: 41 DEGREES
EKG T AXIS: 36 DEGREES
EKG VENTRICULAR RATE: 63 BPM
ESTIMATED AVERAGE GLUCOSE: 120 MG/DL
GFR AFRICAN AMERICAN: >60 ML/MIN
GFR NON-AFRICAN AMERICAN: >60 ML/MIN
GFR SERPL CREATININE-BSD FRML MDRD: ABNORMAL ML/MIN/{1.73_M2}
GFR SERPL CREATININE-BSD FRML MDRD: ABNORMAL ML/MIN/{1.73_M2}
GLUCOSE BLD-MCNC: 104 MG/DL (ref 70–99)
HBA1C MFR BLD: 5.8 % (ref 4–6)
HCT VFR BLD CALC: 39.3 % (ref 36.3–47.1)
HDLC SERPL-MCNC: 44 MG/DL
HEMOGLOBIN: 13.2 G/DL (ref 11.9–15.1)
LDL CHOLESTEROL: 114 MG/DL (ref 0–130)
MCH RBC QN AUTO: 30 PG (ref 25.2–33.5)
MCHC RBC AUTO-ENTMCNC: 33.6 G/DL (ref 28.4–34.8)
MCV RBC AUTO: 89.3 FL (ref 82.6–102.9)
NRBC AUTOMATED: 0 PER 100 WBC
PDW BLD-RTO: 12.3 % (ref 11.8–14.4)
PLATELET # BLD: 201 K/UL (ref 138–453)
PMV BLD AUTO: 10.6 FL (ref 8.1–13.5)
POTASSIUM SERPL-SCNC: 4.2 MMOL/L (ref 3.7–5.3)
RBC # BLD: 4.4 M/UL (ref 3.95–5.11)
SODIUM BLD-SCNC: 139 MMOL/L (ref 135–144)
TOTAL PROTEIN: 7.2 G/DL (ref 6.4–8.3)
TRIGL SERPL-MCNC: 136 MG/DL
VLDLC SERPL CALC-MCNC: NORMAL MG/DL (ref 1–30)
WBC # BLD: 5.7 K/UL (ref 3.5–11.3)

## 2021-10-22 PROCEDURE — 36415 COLL VENOUS BLD VENIPUNCTURE: CPT

## 2021-10-22 PROCEDURE — U0005 INFEC AGEN DETEC AMPLI PROBE: HCPCS

## 2021-10-22 PROCEDURE — 80053 COMPREHEN METABOLIC PANEL: CPT

## 2021-10-22 PROCEDURE — 80061 LIPID PANEL: CPT

## 2021-10-22 PROCEDURE — 85027 COMPLETE CBC AUTOMATED: CPT

## 2021-10-22 PROCEDURE — C9803 HOPD COVID-19 SPEC COLLECT: HCPCS

## 2021-10-22 PROCEDURE — 93005 ELECTROCARDIOGRAM TRACING: CPT

## 2021-10-22 PROCEDURE — U0003 INFECTIOUS AGENT DETECTION BY NUCLEIC ACID (DNA OR RNA); SEVERE ACUTE RESPIRATORY SYNDROME CORONAVIRUS 2 (SARS-COV-2) (CORONAVIRUS DISEASE [COVID-19]), AMPLIFIED PROBE TECHNIQUE, MAKING USE OF HIGH THROUGHPUT TECHNOLOGIES AS DESCRIBED BY CMS-2020-01-R: HCPCS

## 2021-10-22 PROCEDURE — 93010 ELECTROCARDIOGRAM REPORT: CPT | Performed by: FAMILY MEDICINE

## 2021-10-22 PROCEDURE — 83036 HEMOGLOBIN GLYCOSYLATED A1C: CPT

## 2021-10-23 LAB
SARS-COV-2: NORMAL
SARS-COV-2: NOT DETECTED
SOURCE: NORMAL

## 2021-10-25 NOTE — H&P
HPI     Ms Dennis Salinas is a pleasant 78-year-old white female kindly referred to me by Nannette Irving CNP for screening colonoscopy. She has no GI complaints. No abdominal pain. Epigastric discomfort with reflux symptoms are completely managed with Prilosec. Normal appetite. Daily bowel movements, formed and brown, without blood. Colonoscopy 10 years ago without polyps, as she recalls. No recent weight changes, 167 pounds, BMI 32. No cough, fever nor other respiratory symptoms. No history of COVID-19, no vaccination. Mother treated for mantle cell lymphoma, father with lung cancer.   Patient quit tobacco 1982.     Review of Systems     Past Medical History        Past Medical History:   Diagnosis Date    Arthritis      Depression      Hyperlipidemia      Hypertension      Other and unspecified hyperlipidemia      Sleep apnea      Unspecified acute reaction to stress      Unspecified essential hypertension              Past Surgical History         Past Surgical History:   Procedure Laterality Date    HYSTERECTOMY        TUBAL LIGATION                Family History         Family History   Problem Relation Age of Onset    Other Mother           MANTEL CELL LYMPHOMA    Asthma Mother      Heart Attack Mother      High Blood Pressure Mother      High Cholesterol Mother      Stroke Mother      Thyroid Disease Mother      Kidney Disease Mother      Glaucoma Mother      Cancer Mother      Cancer Father           LUNG    Cancer Sister              Allergies:  See list     Current Facility-Administered Medications          Current Outpatient Medications   Medication Sig Dispense Refill    atenolol (TENORMIN) 25 MG tablet TAKE 1 TABLET DAILY 90 tablet 1    citalopram (CELEXA) 20 MG tablet TAKE 1 TABLET ONCE DAILY 90 tablet 1    hydroCHLOROthiazide (HYDRODIURIL) 50 MG tablet TAKE 1 TABLET DAILY 90 tablet 1    omeprazole (PRILOSEC) 20 MG delayed release capsule TAKE 1 CAPSULE DAILY 90 capsule 1  simvastatin (ZOCOR) 40 MG tablet TAKE 1 TABLET EVERY EVENING 90 tablet 1      No current facility-administered medications for this visit.            Social History   Social History            Socioeconomic History    Marital status:        Spouse name: Not on file    Number of children: Not on file    Years of education: Not on file    Highest education level: Not on file   Occupational History    Not on file   Tobacco Use    Smoking status: Former Smoker       Packs/day: 1.00       Years: 6.00       Pack years: 6.00       Types: Cigarettes       Quit date: 18       Years since quittin.7    Smokeless tobacco: Never Used   Vaping Use    Vaping Use: Never used   Substance and Sexual Activity    Alcohol use: Yes       Alcohol/week: 4.0 standard drinks       Types: 4 Cans of beer per week       Comment: weekly    Drug use: No    Sexual activity: Yes       Partners: Male   Other Topics Concern    Not on file   Social History Narrative    Not on file      Social Determinants of Health          Financial Resource Strain:     Difficulty of Paying Living Expenses:    Food Insecurity:     Worried About Running Out of Food in the Last Year:     Ran Out of Food in the Last Year:    Transportation Needs:     Lack of Transportation (Medical):      Lack of Transportation (Non-Medical):    Physical Activity:     Days of Exercise per Week:     Minutes of Exercise per Session:    Stress:     Feeling of Stress :    Social Connections:     Frequency of Communication with Friends and Family:     Frequency of Social Gatherings with Friends and Family:     Attends L[employer] Services:     Active Member of Clubs or Organizations:     Attends Club or Organization Meetings:     Marital Status:    Intimate Partner Violence:     Fear of Current or Ex-Partner:     Emotionally Abused:     Physically Abused:     Sexually Abused:             BP (!) 150/74   Pulse 64   Temp 98 °F (36.7 °C)   Wt 167 lb (75.8 kg)   LMP  (LMP Unknown)   BMI 31.55 kg/m²       Physical Exam     IMAGING/LABS     10/29/2020 10:33 AM - Nelsy Patricia Incoming Lab Results From [employer]     Component Value Ref Range & Units Status Collected Lab   WBC 6.3  3.5 - 11.3 k/uL Final 10/29/2020 10:00 AM [facility]   RBC 4.82  3.95 - 5.11 m/uL Final 10/29/2020 10:00 AM [facility]   Hemoglobin 14.2  11.9 - 15.1 g/dL Final 10/29/2020 10:00 AM [facility]   Hematocrit 42.4  36.3 - 47.1 % Final 10/29/2020 10:00 AM [facility]   MCV 88.0  82.6 - 102.9 fL Final 10/29/2020 10:00 AM [facility]   MCH 29.5  25.2 - 33.5 pg Final 10/29/2020 10:00 AM [facility]   MCHC 33.5  28.4 - 34.8 g/dL Final 10/29/2020 10:00 AM [facility]   RDW 12.2  11.8 - 14.4 % Final 10/29/2020 10:00 AM [facility]   Platelets 371  463 - 453 k/uL Final 10/29/2020 10:00 AM [facility]   MPV 11.0  8.1 - 13.5 fL Final 10/29/2020 10:00 AM [facility]   NRBC Automated 0.0  0.0 per 100 WBC Final 10/29/2020 10:00 AM [facility]   Testing Performed By     Lab - Abbreviation Name Director Address Valid Date Range   [address] [facility] Patrice Lauren  [place] [facility] [address] 08/30/17 0802-12/16/20 1133   Lab and Collection     CBC - 10/29/2020  Result History           ASSESSMENT       Diagnosis Orders   1. Encounter for screening colonoscopy      2. Gastroesophageal reflux disease, unspecified whether esophagitis present      3. BMI 31.0-31.9,adult            PLAN     We will proceed with colonoscopy. Given her history of reflux symptoms currently well managed with Prilosec, EGD has been offered, respectfully declined. Previously discussed alternative of discontinuing acid blockade for 3 to 4 weeks to evaluate for symptom recurrence.   Risks, benefits, alternatives previously reviewed and accepted by Ms. Donte Wharton including GI bleeding, perforation, missed lesions, COVID-19 exposure/infection, etc.  Discussed importance of a healthy, balanced high-fiber low-fat diet with fiber supplementation, increased water intake, decreased calories and sugar, etc.     Wing Holcomb MD

## 2021-10-26 ENCOUNTER — ANESTHESIA EVENT (OUTPATIENT)
Dept: OPERATING ROOM | Age: 66
End: 2021-10-26
Payer: MEDICARE

## 2021-10-26 ENCOUNTER — ANESTHESIA (OUTPATIENT)
Dept: OPERATING ROOM | Age: 66
End: 2021-10-26
Payer: MEDICARE

## 2021-10-26 ENCOUNTER — HOSPITAL ENCOUNTER (OUTPATIENT)
Age: 66
Setting detail: OUTPATIENT SURGERY
Discharge: HOME | End: 2021-10-26
Attending: SURGERY | Admitting: SURGERY
Payer: MEDICARE

## 2021-10-26 VITALS
SYSTOLIC BLOOD PRESSURE: 177 MMHG | BODY MASS INDEX: 30.17 KG/M2 | WEIGHT: 159.8 LBS | TEMPERATURE: 97.8 F | OXYGEN SATURATION: 98 % | RESPIRATION RATE: 18 BRPM | HEART RATE: 61 BPM | HEIGHT: 61 IN | DIASTOLIC BLOOD PRESSURE: 77 MMHG

## 2021-10-26 VITALS
RESPIRATION RATE: 14 BRPM | SYSTOLIC BLOOD PRESSURE: 137 MMHG | OXYGEN SATURATION: 100 % | DIASTOLIC BLOOD PRESSURE: 59 MMHG

## 2021-10-26 PROCEDURE — 2580000003 HC RX 258: Performed by: SURGERY

## 2021-10-26 PROCEDURE — 2709999900 HC NON-CHARGEABLE SUPPLY: Performed by: SURGERY

## 2021-10-26 PROCEDURE — 7100000010 HC PHASE II RECOVERY - FIRST 15 MIN: Performed by: SURGERY

## 2021-10-26 PROCEDURE — 88305 TISSUE EXAM BY PATHOLOGIST: CPT

## 2021-10-26 PROCEDURE — 3700000000 HC ANESTHESIA ATTENDED CARE: Performed by: SURGERY

## 2021-10-26 PROCEDURE — 7100000011 HC PHASE II RECOVERY - ADDTL 15 MIN: Performed by: SURGERY

## 2021-10-26 PROCEDURE — 2500000003 HC RX 250 WO HCPCS: Performed by: NURSE ANESTHETIST, CERTIFIED REGISTERED

## 2021-10-26 PROCEDURE — 3700000001 HC ADD 15 MINUTES (ANESTHESIA): Performed by: SURGERY

## 2021-10-26 PROCEDURE — 6360000002 HC RX W HCPCS: Performed by: NURSE ANESTHETIST, CERTIFIED REGISTERED

## 2021-10-26 PROCEDURE — 3609010700 HC COLONOSCOPY POLYPECTOMY REMOVAL SNARE/STOMA: Performed by: SURGERY

## 2021-10-26 RX ORDER — PROPOFOL 10 MG/ML
INJECTION, EMULSION INTRAVENOUS CONTINUOUS PRN
Status: DISCONTINUED | OUTPATIENT
Start: 2021-10-26 | End: 2021-10-26 | Stop reason: SDUPTHER

## 2021-10-26 RX ORDER — SODIUM CHLORIDE 0.9 % (FLUSH) 0.9 %
10 SYRINGE (ML) INJECTION PRN
Status: DISCONTINUED | OUTPATIENT
Start: 2021-10-26 | End: 2021-10-26 | Stop reason: HOSPADM

## 2021-10-26 RX ORDER — LIDOCAINE HYDROCHLORIDE 20 MG/ML
INJECTION, SOLUTION EPIDURAL; INFILTRATION; INTRACAUDAL; PERINEURAL PRN
Status: DISCONTINUED | OUTPATIENT
Start: 2021-10-26 | End: 2021-10-26 | Stop reason: SDUPTHER

## 2021-10-26 RX ORDER — SODIUM CHLORIDE 9 MG/ML
25 INJECTION, SOLUTION INTRAVENOUS PRN
Status: DISCONTINUED | OUTPATIENT
Start: 2021-10-26 | End: 2021-10-26 | Stop reason: HOSPADM

## 2021-10-26 RX ORDER — PROPOFOL 10 MG/ML
INJECTION, EMULSION INTRAVENOUS PRN
Status: DISCONTINUED | OUTPATIENT
Start: 2021-10-26 | End: 2021-10-26 | Stop reason: SDUPTHER

## 2021-10-26 RX ORDER — SODIUM CHLORIDE, SODIUM LACTATE, POTASSIUM CHLORIDE, CALCIUM CHLORIDE 600; 310; 30; 20 MG/100ML; MG/100ML; MG/100ML; MG/100ML
INJECTION, SOLUTION INTRAVENOUS CONTINUOUS
Status: DISCONTINUED | OUTPATIENT
Start: 2021-10-26 | End: 2021-10-26 | Stop reason: HOSPADM

## 2021-10-26 RX ORDER — SODIUM CHLORIDE 0.9 % (FLUSH) 0.9 %
10 SYRINGE (ML) INJECTION EVERY 12 HOURS SCHEDULED
Status: DISCONTINUED | OUTPATIENT
Start: 2021-10-26 | End: 2021-10-26 | Stop reason: HOSPADM

## 2021-10-26 RX ORDER — SODIUM CHLORIDE 0.9 % (FLUSH) 0.9 %
5-40 SYRINGE (ML) INJECTION EVERY 12 HOURS SCHEDULED
Status: DISCONTINUED | OUTPATIENT
Start: 2021-10-26 | End: 2021-10-26 | Stop reason: HOSPADM

## 2021-10-26 RX ORDER — SODIUM CHLORIDE 0.9 % (FLUSH) 0.9 %
5-40 SYRINGE (ML) INJECTION PRN
Status: DISCONTINUED | OUTPATIENT
Start: 2021-10-26 | End: 2021-10-26 | Stop reason: HOSPADM

## 2021-10-26 RX ADMIN — PROPOFOL 30 MG: 10 INJECTION, EMULSION INTRAVENOUS at 09:11

## 2021-10-26 RX ADMIN — PROPOFOL 180 MCG/KG/MIN: 10 INJECTION, EMULSION INTRAVENOUS at 09:04

## 2021-10-26 RX ADMIN — SODIUM CHLORIDE, POTASSIUM CHLORIDE, SODIUM LACTATE AND CALCIUM CHLORIDE: 600; 310; 30; 20 INJECTION, SOLUTION INTRAVENOUS at 07:46

## 2021-10-26 RX ADMIN — PROPOFOL 40 MG: 10 INJECTION, EMULSION INTRAVENOUS at 09:04

## 2021-10-26 RX ADMIN — PROPOFOL 80 MG: 10 INJECTION, EMULSION INTRAVENOUS at 09:02

## 2021-10-26 RX ADMIN — LIDOCAINE HYDROCHLORIDE 50 MG: 20 INJECTION, SOLUTION EPIDURAL; INFILTRATION; INTRACAUDAL; PERINEURAL at 09:02

## 2021-10-26 ASSESSMENT — PAIN SCALES - GENERAL
PAINLEVEL_OUTOF10: 0

## 2021-10-26 NOTE — PROGRESS NOTES
Patient verbalizes readiness for discharge. Discharge instructions given to patient and responsible adult, answered all questions, and verbalized understanding of discharge instructions. Discharge Criteria    Inpatients must meet Criteria 1 through 7. All other patients are either YES or N/A. If a NO is chosen then Anesthesia or Surgeon must be notified. 1.  Minimum 30 minutes after last dose of sedative medication, minimum 120 minutes after last dose of reversal agent. Yes      2. Systolic BP stable within 20 mmHg for 30 minutes & systolic BP between 90 & 937 or within 10 mmHg of baseline. Yes, BP remains elevated, DEBRA Machuca ok with discharge and pt taking home BP meds. 3.  Pulse between 60 and 100 or within 10 bpm of baseline. Yes      4. Spontaneous respiratory rate >/= 10 per minute. Yes      5. SaO2 >/= 95 or  >/= baseline. Yes      6. Able to cough and swallow or return to baseline function. Yes      7. Alert and oriented or return to baseline mental status. Yes      8. Demonstrates controlled, coordinated movements, ambulates with steady gait, or return to baseline activity function. Yes      9. Minimal or no pain or nausea, or at a level tolerable and acceptable to patient. Yes      10. Takes and retains oral fluids as allowed. Yes      11. Procedural / perioperative site stable. Minimal or no bleeding. Yes          12. If GI endoscopy procedure, minimal or no abdominal distention or passing flatus. Yes      13. Written discharge instructions and emergency telephone number provided. Yes      14. Accompanied by a responsible adult.     Yes

## 2021-10-26 NOTE — BRIEF OP NOTE
Brief Postoperative Note      Patient: Shirley Crigler  YOB: 1955  MRN: [id number]    Date of Procedure: 10/26/2021    Pre-Op Diagnosis:      1. Screening colonoscopy    Post-Op Diagnosis:      1. Large sessile cecal polyp     2. Grade I internal hemorrhoids       Operation:     1. Colonoscopy anus to terminal ileum     2. Large sessile cecal polypectomy    Surgeon(s):  Dennis Oscar MD    Assistant:  * No surgical staff found *    Anesthesia: Monitor Anesthesia Care    Estimated Blood Loss (mL): less than 35MF     Complications: None    Specimens:   ID Type Source Tests Collected by Time Destination   A :  Tissue Cecum SURGICAL PATHOLOGY Dennis Oscar MD 10/26/2021 9523      Findings:  As above.     Dictated # [id number]    Electronically signed by Dennis Oscar MD on 10/26/2021 at 9:42 AM

## 2021-10-26 NOTE — OP NOTE
[address], [place] [address]                                OPERATIVE REPORT    PATIENT NAME: Gege Prabhakar                   :        1955  MED REC NO:   [id number]                              ROOM:  ACCOUNT NO:   [de-identified]                           ADMIT DATE: 10/26/2021  PROVIDER:     Jennifer Stanton    DATE OF PROCEDURE:  10/26/2021    ENDOSCOPY REPORT    ATTENDING SURGEON:  Dr. Jennifer Stanton. PRIMARY CARE PROVIDER:  JEANNE Love    PREOPERATIVE DIAGNOSIS:  Screening colonoscopy. POSTOPERATIVE DIAGNOSES:  1.  Large sessile cecal polyp. 2.  Grade 1 internal hemorrhoids. PROCEDURES PERFORMED:  1. Colonoscopy, anus to terminal ileum. 2.  Large sessile cecal polypectomy. ANESTHESIA:  MAC.    ESTIMATED BLOOD LOSS:  Less than 20 mL. INDICATIONS:  The patient is a pleasant 80-year-old white female  presenting for screening colonoscopy. No consistent GI complaints. Epigastric discomfort with reflux symptoms are well managed with  Prilosec. Colonoscopy in  without polypectomy according to the  endoscopy report. BMI of 32. No family history of colon cancer. The  patient quit tobacco in . At this time, screening colonoscopy is  indicated. DESCRIPTION OF PROCEDURE:  After obtaining informed consent with  discussion of the risks, benefits, and alternatives including a risk of  GI bleeding, perforation, missed lesions, COVID-19 exposure/infection,  etc., the patient was endoscopy suite and placed in the left lateral  recumbent position. Following adequate IV sedation, a digital rectal  exam was performed. Sphincter tone was normal.  A colonoscope was  passed transanally into the rectum and advanced with gentle insufflation  throughout the entirety of the colon to the cecum.   Cecal position was  confirmed by clear visualization of the ileocecal valve, the appendiceal  orifice, and

## 2021-10-26 NOTE — ANESTHESIA PRE PROCEDURE
Department of Anesthesiology  Preprocedure Note       Name:  Digna Steve   Age:  77 y.o.  :  1955                                          MRN:  [id number]         Date:  10/26/2021      Surgeon: Donn Daugherty):  Toña Hanna MD    Procedure: Procedure(s):  COLORECTAL CANCER SCREENING, NOT HIGH RISK    Medications prior to admission:   Prior to Admission medications    Medication Sig Start Date End Date Taking? Authorizing Provider   atenolol (TENORMIN) 25 MG tablet TAKE 1 TABLET DAILY 21  Yes JUAN Linda CNP   citalopram (CELEXA) 20 MG tablet TAKE 1 TABLET ONCE DAILY 21  Yes JUAN Linda CNP   hydroCHLOROthiazide (HYDRODIURIL) 50 MG tablet TAKE 1 TABLET DAILY 21  Yes JUAN Linda CNP   omeprazole (PRILOSEC) 20 MG delayed release capsule TAKE 1 CAPSULE DAILY 21  Yes JUAN Linda CNP   simvastatin (ZOCOR) 40 MG tablet TAKE 1 TABLET EVERY EVENING 21  Yes JUAN Linda CNP       Current medications:    Current Facility-Administered Medications   Medication Dose Route Frequency Provider Last Rate Last Admin    lactated ringers infusion   IntraVENous Continuous Toña Hanna  mL/hr at 10/26/21 0746 New Bag at 10/26/21 0746    sodium chloride flush 0.9 % injection 5-40 mL  5-40 mL IntraVENous 2 times per day Toña Hanna MD        sodium chloride flush 0.9 % injection 5-40 mL  5-40 mL IntraVENous PRN Toña Hanna MD        0.9 % sodium chloride infusion  25 mL IntraVENous PRN Toña Hanna MD           Allergies:     Allergies   Allergen Reactions    Keflex [Cephalexin]      RASH    Sulfa Antibiotics        Problem List:    Patient Active Problem List   Diagnosis Code    Essential hypertension I10    Hyperlipidemia E78.5    Acute reaction to stress F43.0    Rosacea L71.9    Encounter for screening colonoscopy Z12.11       Past Medical History:        Diagnosis Date    Arthritis     Depression     Hyperlipidemia     Hypertension     Other and unspecified hyperlipidemia     Sleep apnea     Unspecified acute reaction to stress     Unspecified essential hypertension        Past Surgical History:        Procedure Laterality Date    COLONOSCOPY      HYSTERECTOMY      TUBAL LIGATION         Social History:    Social History     Tobacco Use    Smoking status: Former Smoker     Packs/day: 1.00     Years: 6.00     Pack years: 6.00     Types: Cigarettes     Quit date:      Years since quittin.8    Smokeless tobacco: Never Used   Substance Use Topics    Alcohol use: Yes     Alcohol/week: 4.0 standard drinks     Types: 4 Cans of beer per week     Comment: weekly                                Counseling given: Not Answered      Vital Signs (Current):   Vitals:    10/11/21 1541 10/26/21 0715 10/26/21 0725   BP:   (!) 163/87   Pulse:   76   Resp:   17   Temp:   36.4 °C (97.6 °F)   TempSrc:   Temporal   SpO2:   94%   Weight: 160 lb (72.6 kg) 159 lb 12.8 oz (72.5 kg)    Height: 5' 1\" (1.549 m) 5' 1\" (1.549 m)                                               BP Readings from Last 3 Encounters:   10/26/21 (!) 163/87   21 (!) 150/74   21 (!) 108/58       NPO Status: Time of last liquid consumption:                         Time of last solid consumption:                         Date of last liquid consumption: 10/25/21                        Date of last solid food consumption: 10/24/21    BMI:   Wt Readings from Last 3 Encounters:   10/26/21 159 lb 12.8 oz (72.5 kg)   21 167 lb (75.8 kg)   21 158 lb (71.7 kg)     Body mass index is 30.19 kg/m².     CBC:   Lab Results   Component Value Date    WBC 5.7 10/22/2021    RBC 4.40 10/22/2021    HGB 13.2 10/22/2021    HCT 39.3 10/22/2021    MCV 89.3 10/22/2021    RDW 12.3 10/22/2021     10/22/2021       CMP:   Lab Results   Component Value Date     10/22/2021    K 4.2 10/22/2021     10/22/2021    CO2 27 10/22/2021    BUN 21 10/22/2021    CREATININE 0.83 10/22/2021    GFRAA >60 10/22/2021    LABGLOM >60 10/22/2021    GLUCOSE 104 10/22/2021    GLUCOSE 95 06/26/2018    PROT 7.2 10/22/2021    CALCIUM 9.3 10/22/2021    BILITOT 0.35 10/22/2021    ALKPHOS 68 10/22/2021    AST 18 10/22/2021    ALT 25 10/22/2021       POC Tests: No results for input(s): POCGLU, POCNA, POCK, POCCL, POCBUN, POCHEMO, POCHCT in the last 72 hours. Coags: No results found for: PROTIME, INR, APTT    HCG (If Applicable): No results found for: PREGTESTUR, PREGSERUM, HCG, HCGQUANT     ABGs: No results found for: PHART, PO2ART, DFS8FTS, PDO4RJU, BEART, S9APAZGM     Type & Screen (If Applicable):  No results found for: LABABO, LABRH    Drug/Infectious Status (If Applicable):  No results found for: HIV, HEPCAB    COVID-19 Screening (If Applicable):   Lab Results   Component Value Date    COVID19 Not Detected 10/22/2021           Anesthesia Evaluation  Patient summary reviewed and Nursing notes reviewed  Airway: Mallampati: III  TM distance: >3 FB   Neck ROM: full  Mouth opening: > = 3 FB Dental:          Pulmonary:normal exam    (+) sleep apnea: on CPAP,                             Cardiovascular:Negative CV ROS    (+) hypertension: no interval change,                   Neuro/Psych:   (+) psychiatric history: stable with treatment            GI/Hepatic/Renal: Neg GI/Hepatic/Renal ROS            Endo/Other: Negative Endo/Other ROS                    Abdominal:             Vascular: negative vascular ROS. Other Findings:             Anesthesia Plan      MAC     ASA 3       Induction: intravenous. Anesthetic plan and risks discussed with patient.                       JUAN Coulter - DEBRA   10/26/2021

## 2021-10-26 NOTE — ANESTHESIA POSTPROCEDURE EVALUATION
Department of Anesthesiology  Postprocedure Note    Patient: Miguel Ngo  MRN: [id number]  YOB: 1955  Date of evaluation: 10/26/2021  Time:  11:19 AM     Procedure Summary     Date: 10/26/21 Room / Location: [address] [place] [address]    Anesthesia Start: 9885 Anesthesia Stop: 8529    Procedure: COLONOSCOPY POLYPECTOMY REMOVAL HOT SNARE (N/A ) Diagnosis: (SCREENING)    Surgeons: Jose Francisco Castellanos MD Responsible Provider: JUAN Pastrana CRNA    Anesthesia Type: MAC ASA Status: 3          Anesthesia Type: MAC    Jennifer Phase I: Jennifer Score: 10    Jennifer Phase II: Jennifer Score: 10    Last vitals: Reviewed and per EMR flowsheets.        Anesthesia Post Evaluation    Patient location during evaluation: PACU  Patient participation: complete - patient participated  Level of consciousness: awake and alert  Pain score: 0  Airway patency: patent  Nausea & Vomiting: no vomiting and no nausea  Complications: no  Cardiovascular status: blood pressure returned to baseline  Respiratory status: acceptable  Hydration status: stable  Comments: Patient to return home and take blood pressure medications

## 2021-10-26 NOTE — PROGRESS NOTES
Amanda Zuniga notified of pt's continued elevated BP, states pt should take her atenolol and hydrodiuril when she gets home, as she has not taken those yet today. Encouraged pt to monitor BP at home and if BP remains elevated or becomes symptomatic to notify her PCP or come to the ED.

## 2021-10-28 LAB — SURGICAL PATHOLOGY REPORT: NORMAL

## 2021-11-02 ENCOUNTER — OFFICE VISIT (OUTPATIENT)
Dept: SURGERY | Age: 66
End: 2021-11-02
Payer: MEDICARE

## 2021-11-02 DIAGNOSIS — D12.0 ADENOMATOUS POLYP OF CECUM: ICD-10-CM

## 2021-11-02 DIAGNOSIS — K64.0 GRADE I INTERNAL HEMORRHOIDS: ICD-10-CM

## 2021-11-02 DIAGNOSIS — Z98.890 S/P COLONOSCOPY WITH POLYPECTOMY: Primary | ICD-10-CM

## 2021-11-02 PROCEDURE — G8484 FLU IMMUNIZE NO ADMIN: HCPCS | Performed by: SURGERY

## 2021-11-02 PROCEDURE — 1036F TOBACCO NON-USER: CPT | Performed by: SURGERY

## 2021-11-02 PROCEDURE — 99212 OFFICE O/P EST SF 10 MIN: CPT | Performed by: SURGERY

## 2021-11-02 PROCEDURE — G8399 PT W/DXA RESULTS DOCUMENT: HCPCS | Performed by: SURGERY

## 2021-11-02 PROCEDURE — 1090F PRES/ABSN URINE INCON ASSESS: CPT | Performed by: SURGERY

## 2021-11-02 PROCEDURE — 1123F ACP DISCUSS/DSCN MKR DOCD: CPT | Performed by: SURGERY

## 2021-11-02 PROCEDURE — 3017F COLORECTAL CA SCREEN DOC REV: CPT | Performed by: SURGERY

## 2021-11-02 PROCEDURE — G8417 CALC BMI ABV UP PARAM F/U: HCPCS | Performed by: SURGERY

## 2021-11-02 PROCEDURE — G8427 DOCREV CUR MEDS BY ELIG CLIN: HCPCS | Performed by: SURGERY

## 2021-11-02 PROCEDURE — 4040F PNEUMOC VAC/ADMIN/RCVD: CPT | Performed by: SURGERY

## 2021-11-02 NOTE — LETTER
[facility] of [facility]  [address]  [facility] [address]  Phone: [phone]  Fax: [phone]    Lisseth Mota MD    January 28, 2022     Mirna Elizondo [address] [place] [address] [place] [address]    Patient: Link Cole   MR Number: [id number]   YOB: 1955   Date of Visit: 11/2/2021       Dear Cassi Diaz: Thank you for referring Shannan Fischer to me for evaluation/treatment. Below are the relevant portions of my assessment and plan of care. ASSESSMENT     Diagnosis Orders   1. S/P colonoscopy with polypectomy     2. Adenomatous polyp of cecum     3. Grade I internal hemorrhoids     4. BMI 30.0-30.9,adult         PLAN    Endoscopic findings of large adenomatous sessile cecal polypectomy, grade 1 internal hemorrhoids reviewed with Ms. Olmedo. Recommend next screening colonoscopy in 1 to 2 years, age 73-68. Discussed importance of a healthy, balanced high-fiber low-fat diet, with fiber supplementation, increased water intake and physical activity, decrease calories and sugar, etc.     If you have questions, please do not hesitate to call me. I look forward to following Dl Coy along with you.     Sincerely,      Lisseth Mota MD

## 2021-11-04 ENCOUNTER — OFFICE VISIT (OUTPATIENT)
Dept: PULMONOLOGY | Age: 66
End: 2021-11-04
Payer: MEDICARE

## 2021-11-04 VITALS
OXYGEN SATURATION: 96 % | RESPIRATION RATE: 18 BRPM | TEMPERATURE: 97.6 F | HEIGHT: 61 IN | BODY MASS INDEX: 29.98 KG/M2 | SYSTOLIC BLOOD PRESSURE: 139 MMHG | WEIGHT: 158.8 LBS | DIASTOLIC BLOOD PRESSURE: 79 MMHG | HEART RATE: 57 BPM

## 2021-11-04 DIAGNOSIS — G47.33 OSA ON CPAP: Primary | ICD-10-CM

## 2021-11-04 DIAGNOSIS — I10 ESSENTIAL HYPERTENSION: ICD-10-CM

## 2021-11-04 DIAGNOSIS — Z99.89 OSA ON CPAP: Primary | ICD-10-CM

## 2021-11-04 PROCEDURE — G8399 PT W/DXA RESULTS DOCUMENT: HCPCS | Performed by: INTERNAL MEDICINE

## 2021-11-04 PROCEDURE — 1036F TOBACCO NON-USER: CPT | Performed by: INTERNAL MEDICINE

## 2021-11-04 PROCEDURE — G8484 FLU IMMUNIZE NO ADMIN: HCPCS | Performed by: INTERNAL MEDICINE

## 2021-11-04 PROCEDURE — 3017F COLORECTAL CA SCREEN DOC REV: CPT | Performed by: INTERNAL MEDICINE

## 2021-11-04 PROCEDURE — 99213 OFFICE O/P EST LOW 20 MIN: CPT | Performed by: INTERNAL MEDICINE

## 2021-11-04 PROCEDURE — 4040F PNEUMOC VAC/ADMIN/RCVD: CPT | Performed by: INTERNAL MEDICINE

## 2021-11-04 PROCEDURE — G8417 CALC BMI ABV UP PARAM F/U: HCPCS | Performed by: INTERNAL MEDICINE

## 2021-11-04 PROCEDURE — 1090F PRES/ABSN URINE INCON ASSESS: CPT | Performed by: INTERNAL MEDICINE

## 2021-11-04 PROCEDURE — 1123F ACP DISCUSS/DSCN MKR DOCD: CPT | Performed by: INTERNAL MEDICINE

## 2021-11-04 PROCEDURE — G8427 DOCREV CUR MEDS BY ELIG CLIN: HCPCS | Performed by: INTERNAL MEDICINE

## 2021-11-04 NOTE — PATIENT INSTRUCTIONS
SURVEY:    You may be receiving a survey from [employer] regarding your visit today. Please complete the survey to enable us to provide the highest quality of care to you and your family. If you cannot score us a very good on any question, please call the office to discuss how we could have made your experience a very good one. Thank you.

## 2021-11-04 NOTE — PROGRESS NOTES
OUTPATIENT PULMONARY PROGRESS NOTE      Patient:  Deanna Cook  MRN: [id number]    Consulting Physician: Dr Philippe Alcocer  Reason for Consult: FABIEN  [facility]: JUAN Suarez CNP    HISTORY OF PRESENT ILLNESS:   The patient is a 77 y.o. female for follow-up of sleep apnea    She is here for follow-up of sleep apnea. She was seen last time 6 months ago since she was seen last time she has been doing pretty well. She is very compliant with CPAP use CPAP every night. According to patient she is okay to use nasal pillows she feels like that pressure is okay but it can be increased a little bit sometimes she feels like the pressure is low. She is well adjusted to CPAP with nasal pillows. When she wake up in the morning she feels fresh. Sleep is restorative. She does not take nap during the daytime. She does not doze off. Sleep quality is good and she does not have nocturnal awakening most of the night. If sometimes she goes to sleep and forget to put the CPAP on her  wake her up as she is a snoring and gasping for air according to . According patient she does not sleep well without CPAP. Compliance data is available from 10/167410 to 11/01/2021. Compliance is 97% for 30 days. Average usage is 7 hours and 57 minutes and average AHI is 2.0 on CPAP of 8 cm. Sleep questionnaire on CPAP on 11/04/2021  Occasional snoring at night, does not wakes herself snoring. Has no witnessed apnea. Does wakes up with choking and gasping sensation. no dry mouth upon awakening. Negative fatigue and tiredness during the day. Goes to sleep at 9 - 10 pm, wakes up 6 am. It takes 10 minutes to fall asleep. Occasionally wake up at night to go to bathroom. Takes no nap during the day. No headache in am. No car wrecks or near wrecks because of the sleepiness. No nodding off while driving. No weight gain. Positive forgetfulness or decreased concentration.  No nasal congestion or obstruction at night. No significant caffienated drinks or alcohol. Using CPAP 8 hr/night. No leg jerks during sleep. No restless feelings in legs at night. No numbness or burning in leg or feet. No leg aches or cramps . Initial history and evaluation  She was diagnosed with obstructive sleep apnea approximately 10 years ago when she had been followed by Dr. Ace Saldana. Her last sleep study approximately was that time and she has been using CPAP almost 90% of the time according to patient no sleep data is available no sleep studies available no compliance data is available. According to patient she use CPAP with nasal pillows. She had history of snoring she was noted to have desaturation while she was in the hospital and she had awakening with a snoring and feeling of tiredness and fatigue during the daytime and that however she had a sleep study done and was diagnosed with sleep apnea. According to patient with a CPAP also she still feels like that she is tired and fatigue morning sleep is not safe refreshing, she does still wake up with a snoring and her  told her that she still have episodes of apnea she does not take nap during the daytime and usually she does not doze off during the daytime. She denies shortness of breath, wheezing chest tightness. She denies history of asthma or COPD. She is a non-smoker history of smoking only for 4 years remote history. No flowsheet data found.     ESS: 2  Sitting and reading 1  Watching TV 0  Sitting inactive in a public place (e.g a theater or a meeting) 0  As a passenger in a car for an hour without a break 1  Lying down to rest in the afternoon when circumstances permit 0  Sitting and talking to someone 0  Sitting quietly after a lunch without alcohol 0  In a car, while stopped for a few minutes in traffic0    Past Medical History:        Diagnosis Date    Arthritis     Depression     Hyperlipidemia     Hypertension     Other and unspecified hyperlipidemia  Sleep apnea     Unspecified acute reaction to stress     Unspecified essential hypertension        Past Surgical History:        Procedure Laterality Date    COLONOSCOPY      COLONOSCOPY  10/26/2021    Dr. Chavez Corea - polypectomy    COLONOSCOPY N/A 10/26/2021    COLONOSCOPY POLYPECTOMY REMOVAL HOT SNARE performed by Toña Hanna MD at [facility]         Allergies: Allergies   Allergen Reactions    Keflex [Cephalexin]      RASH    Sulfa Antibiotics          Home Meds:   Outpatient Encounter Medications as of 11/4/2021   Medication Sig Dispense Refill    atenolol (TENORMIN) 25 MG tablet TAKE 1 TABLET DAILY 90 tablet 1    citalopram (CELEXA) 20 MG tablet TAKE 1 TABLET ONCE DAILY 90 tablet 1    hydroCHLOROthiazide (HYDRODIURIL) 50 MG tablet TAKE 1 TABLET DAILY 90 tablet 1    omeprazole (PRILOSEC) 20 MG delayed release capsule TAKE 1 CAPSULE DAILY 90 capsule 1    simvastatin (ZOCOR) 40 MG tablet TAKE 1 TABLET EVERY EVENING 90 tablet 1     No facility-administered encounter medications on file as of 11/4/2021. Social History:   TOBACCO:   reports that she quit smoking about 39 years ago. Her smoking use included cigarettes. She has a 6.00 pack-year smoking history. She has never used smokeless tobacco.  ETOH:   reports current alcohol use of about 4.0 standard drinks of alcohol per week.   OCCUPATION:      Family History:       Problem Relation Age of Onset    Other Mother         MANTEL CELL LYMPHOMA    Asthma Mother     Heart Attack Mother     High Blood Pressure Mother     High Cholesterol Mother     Stroke Mother     Thyroid Disease Mother     Kidney Disease Mother     Glaucoma Mother     Cancer Mother     Cancer Father         LUNG    Cancer Sister        Immunizations:    Immunization History   Administered Date(s) Administered    Td, unspecified formulation 11/29/2012         REVIEW OF SYSTEMS:  CONSTITUTIONAL:  negative for fevers, chills, sweats, fatigue, anorexia, and weight loss  EYES:  negative for  double vision, blurred vision, dry eyes, eye discharge, visual disturbance, redness, and icterus  HEENT:  negative for  hearing loss, tinnitus, ear drainage, earaches, nasal congestion, epistaxis, sore throat, hoarseness, voice change, and postnasal drip  RESPIRATORY:  negative for  dry cough, cough with sputum, dyspnea, wheezing, hemoptysis, chest pain, and pleuritic pain  CARDIOVASCULAR:  negative for  chest pain, dyspnea, palpitations, orthopnea, PND, exertional chest pressure/discomfort, fatigue, edema, syncope  GASTROINTESTINAL:  negative for nausea, vomiting, diarrhea, constipation, abdominal pain, abdominal mass, abdominal distention, jaundice, dysphagia, reflux, odynophagia, hematemesis, and hemtochezia  GENITOURINARY:  negative for frequency, dysuria, nocturia, and hematuria  HEMATOLOGIC/LYMPHATIC:  negative for easy bruising, bleeding, lymphadenopathy, and petechiae  ALLERGIC/IMMUNOLOGIC:  negative for recurrent infections, urticaria, hay fever, angioedema, anaphylaxis, and drug reactions  ENDOCRINE:  negative for heat intolerance, cold intolerance, tremor, and weight changes  MUSCULOSKELETAL:  negative for  myalgias, arthralgias, joint swelling, stiff joints, and muscle weakness  NEUROLOGICAL:  negative for headaches, dizziness, seizures, memory problems, speech problems, visual disturbance, gait problems, tremor, dysphagia, weakness, numbness, syncope, and tingling  BEHAVIOR/PSYCH:  negative for decreased sleep, decreased energy level, increased energy level, poor concentration, depressed mood, and anxiety          Physical Exam:    Vitals: /79 (Site: Left Upper Arm, Position: Sitting, Cuff Size: Medium Adult)   Pulse 57   Temp 97.6 °F (36.4 °C) (Temporal)   Resp 18   Ht 5' 1\" (1.549 m)   Wt 158 lb 12.8 oz (72 kg)   LMP  (LMP Unknown)   SpO2 96%   BMI 30.00 kg/m²   Last 3 weights:    Wt Readings from Last 3 Encounters:   11/04/21 158 lb 12.8 oz (72 kg)   10/29/21 157 lb (71.2 kg)   10/26/21 159 lb 12.8 oz (72.5 kg)     Body mass index is 30 kg/m². Physical Examination:   General appearance - alert, well appearing, and in no distress, normal appearing weight and acyanotic, in no respiratory distress  Mental status - alert, oriented to person, place, and time  Eyes - pupils equal and reactive, extraocular eye movements intact, sclera anicteric, left eye normal, right eye normal  Ears - right ear normal, left ear normal  Nose - normal and patent, no erythema, discharge or polyps  Mouth - mucous membranes moist, pharynx normal without lesions and small oropharynx, small chin, large tongue, Mallampati 3  Neck - supple, no significant adenopathy  Chest - no tachypnea, retractions or cyanosis bilateral symmetrical chest movement, normal resonance on percussion, air entry is present bilaterally and symmetrical, no expiratory wheezing rhonchi or crackles. Heart - normal rate, regular rhythm, normal S1, S2, no murmurs, rubs, clicks or gallops  Abdomen - soft, nontender, nondistended, no masses or organomegaly  Neurological - alert, oriented, normal speech, no focal findings or movement disorder noted  Extremities - peripheral pulses normal, no pedal edema, no clubbing or cyanosis  Skin - normal coloration and turgor, no rashes, no suspicious skin lesions noted       LABS:    CBC:   WBC   Date Value Ref Range Status   10/22/2021 5.7 3.5 - 11.3 k/uL Final   10/29/2020 6.3 3.5 - 11.3 k/uL Final   12/26/2012 7.6 3.5 - 11.0 k/uL Final     Hemoglobin   Date Value Ref Range Status   10/22/2021 13.2 11.9 - 15.1 g/dL Final   10/29/2020 14.2 11.9 - 15.1 g/dL Final   12/26/2012 13.9 12.0 - 16.0 g/dL Final     Platelets   Date Value Ref Range Status   10/22/2021 201 138 - 453 k/uL Final   10/29/2020 206 138 - 453 k/uL Final   12/26/2012 208 140 - 450 k/uL Final     BMP:     Hepatic:     Amylase: No results found for: AMYLASE  Lipase:  No results found for: LIPASE  CARDIAC ENZYMES: No results found for: CKTOTAL, CKMB, CKMBINDEX, TROPONINI  BNP: No results found for: BNP  Lipids:       INR: No results found for: INR  Thyroid: No results found for: TSH  Urinalysis:     Cultures:-  -----------------------------------------------------------------    ABGs: No results found for: PHART, PO2ART, [id number]    Pulmonary Functions Testing Results:    No results found for: FEV1, FVC, VDR9EXX, TLC, DLCO        Assessment and Plan       ICD-10-CM    1. FABIEN on CPAP  G47.33     Z99.89    2. Essential hypertension  I10          Assessment:    Compliance data seen and she is very compliant with CPAP. She is tolerating CPAP with nasal pillows compliance is good. Compliance data reviewed and her compliance is 97% average usage is 7 hours and 57 minutes with AHI of 2.0. She feels like that the CPAP pressure is slightly low will increase CPAP pressure to 9 and if she feels like that 9 is high then we will turn it back down to 8. Advise weight loss, increase activity exercise sleep hygiene discussed    Plan and recommendation:    Increase CPAP pressure to 9 cm  Continue nasal pillows. Continue current CPAP at least 4 hrs qhs  Wt loss is recommended and discussed  Follow good sleep hygeine instructions  Use humidifier  Questions answered pertaining to diagnosis and management explained importance of compliance with therapy   Compliance data not available  Need compliance data before next visit. Refused flu vaccine and covid vaccine  Refused Pneumonia vaccine  Vaccinations recommended  Up to date with vaccinations from pulm perspective   Maintain an active lifestyle     We will up in office in 1 year. Please note that this chart was generated using voice recognition Dragon dictation software. Although every effort was made to ensure the accuracy of this automated transcription, some errors in transcription may have occurred.       It was my pleasure to evaluate Shirley Crigler today. Please call with questions.     Va Cruz MD, MD             11/4/2021, 9:13 AM

## 2021-11-25 PROBLEM — Z12.11 ENCOUNTER FOR SCREENING COLONOSCOPY: Status: RESOLVED | Noted: 2019-12-14 | Resolved: 2021-11-25

## 2021-12-20 DIAGNOSIS — U07.1 ACUTE COVID-19: ICD-10-CM

## 2021-12-20 RX ORDER — ACETAMINOPHEN 325 MG/1
650 TABLET ORAL
OUTPATIENT
Start: 2021-12-20

## 2021-12-20 RX ORDER — SODIUM CHLORIDE 9 MG/ML
INJECTION, SOLUTION INTRAVENOUS CONTINUOUS
Status: CANCELLED | OUTPATIENT
Start: 2021-12-20

## 2021-12-20 RX ORDER — SODIUM CHLORIDE 9 MG/ML
25 INJECTION, SOLUTION INTRAVENOUS PRN
OUTPATIENT
Start: 2021-12-20

## 2021-12-20 RX ORDER — DIPHENHYDRAMINE HYDROCHLORIDE 50 MG/ML
50 INJECTION INTRAMUSCULAR; INTRAVENOUS
OUTPATIENT
Start: 2021-12-20

## 2021-12-20 RX ORDER — SODIUM CHLORIDE 0.9 % (FLUSH) 0.9 %
5-40 SYRINGE (ML) INJECTION PRN
Status: CANCELLED | OUTPATIENT
Start: 2021-12-20

## 2021-12-20 RX ORDER — SODIUM CHLORIDE 9 MG/ML
INJECTION, SOLUTION INTRAVENOUS CONTINUOUS
OUTPATIENT
Start: 2021-12-20

## 2021-12-20 RX ORDER — ALBUTEROL SULFATE 90 UG/1
4 AEROSOL, METERED RESPIRATORY (INHALATION) PRN
OUTPATIENT
Start: 2021-12-20

## 2021-12-20 RX ORDER — ONDANSETRON 2 MG/ML
8 INJECTION INTRAMUSCULAR; INTRAVENOUS
OUTPATIENT
Start: 2021-12-20

## 2021-12-20 RX ORDER — EPINEPHRINE 1 MG/ML
0.3 INJECTION, SOLUTION, CONCENTRATE INTRAVENOUS PRN
OUTPATIENT
Start: 2021-12-20

## 2021-12-20 RX ORDER — HEPARIN SODIUM (PORCINE) LOCK FLUSH IV SOLN 100 UNIT/ML 100 UNIT/ML
500 SOLUTION INTRAVENOUS PRN
OUTPATIENT
Start: 2021-12-20

## 2021-12-21 ENCOUNTER — HOSPITAL ENCOUNTER (OUTPATIENT)
Dept: INFUSION THERAPY | Age: 66
Discharge: HOME | End: 2021-12-21
Payer: MEDICARE

## 2021-12-21 VITALS
SYSTOLIC BLOOD PRESSURE: 148 MMHG | DIASTOLIC BLOOD PRESSURE: 89 MMHG | RESPIRATION RATE: 18 BRPM | OXYGEN SATURATION: 96 % | TEMPERATURE: 100.7 F | HEART RATE: 84 BPM

## 2021-12-21 DIAGNOSIS — U07.1 ACUTE COVID-19: Primary | ICD-10-CM

## 2021-12-21 PROCEDURE — 2500000003 HC RX 250 WO HCPCS: Performed by: NURSE PRACTITIONER

## 2021-12-21 PROCEDURE — 6360000002 HC RX W HCPCS: Performed by: NURSE PRACTITIONER

## 2021-12-21 PROCEDURE — M0245 HC IV INFUSION BAMLANIVIMAB & ETESEVIMAB W/MONITORING: HCPCS

## 2021-12-21 PROCEDURE — 2580000003 HC RX 258: Performed by: NURSE PRACTITIONER

## 2021-12-21 RX ORDER — EPINEPHRINE 1 MG/ML
0.3 INJECTION, SOLUTION, CONCENTRATE INTRAVENOUS PRN
OUTPATIENT
Start: 2021-12-21

## 2021-12-21 RX ORDER — HEPARIN SODIUM (PORCINE) LOCK FLUSH IV SOLN 100 UNIT/ML 100 UNIT/ML
500 SOLUTION INTRAVENOUS PRN
OUTPATIENT
Start: 2021-12-21

## 2021-12-21 RX ORDER — SODIUM CHLORIDE 9 MG/ML
INJECTION, SOLUTION INTRAVENOUS CONTINUOUS
OUTPATIENT
Start: 2021-12-21

## 2021-12-21 RX ORDER — SODIUM CHLORIDE 0.9 % (FLUSH) 0.9 %
5-40 SYRINGE (ML) INJECTION PRN
Status: DISCONTINUED | OUTPATIENT
Start: 2021-12-21 | End: 2021-12-22 | Stop reason: HOSPADM

## 2021-12-21 RX ORDER — ONDANSETRON 2 MG/ML
8 INJECTION INTRAMUSCULAR; INTRAVENOUS
OUTPATIENT
Start: 2021-12-21

## 2021-12-21 RX ORDER — ALBUTEROL SULFATE 90 UG/1
4 AEROSOL, METERED RESPIRATORY (INHALATION) PRN
OUTPATIENT
Start: 2021-12-21

## 2021-12-21 RX ORDER — SODIUM CHLORIDE 0.9 % (FLUSH) 0.9 %
5-40 SYRINGE (ML) INJECTION PRN
Status: CANCELLED | OUTPATIENT
Start: 2021-12-21

## 2021-12-21 RX ORDER — SODIUM CHLORIDE 9 MG/ML
INJECTION, SOLUTION INTRAVENOUS CONTINUOUS
Status: CANCELLED | OUTPATIENT
Start: 2021-12-21

## 2021-12-21 RX ORDER — SODIUM CHLORIDE 9 MG/ML
25 INJECTION, SOLUTION INTRAVENOUS PRN
OUTPATIENT
Start: 2021-12-21

## 2021-12-21 RX ORDER — SODIUM CHLORIDE 9 MG/ML
INJECTION, SOLUTION INTRAVENOUS CONTINUOUS
Status: DISCONTINUED | OUTPATIENT
Start: 2021-12-21 | End: 2021-12-22 | Stop reason: HOSPADM

## 2021-12-21 RX ORDER — DIPHENHYDRAMINE HYDROCHLORIDE 50 MG/ML
50 INJECTION INTRAMUSCULAR; INTRAVENOUS
OUTPATIENT
Start: 2021-12-21

## 2021-12-21 RX ORDER — ACETAMINOPHEN 325 MG/1
650 TABLET ORAL
OUTPATIENT
Start: 2021-12-21

## 2021-12-21 RX ADMIN — SODIUM CHLORIDE: 9 INJECTION, SOLUTION INTRAVENOUS at 08:21

## 2021-12-21 RX ADMIN — SODIUM CHLORIDE, PRESERVATIVE FREE 10 ML: 5 INJECTION INTRAVENOUS at 08:10

## 2021-12-21 RX ADMIN — SODIUM CHLORIDE: 9 INJECTION, SOLUTION INTRAVENOUS at 08:22

## 2022-01-28 ASSESSMENT — ENCOUNTER SYMPTOMS
COUGH: 0
VOMITING: 0
SHORTNESS OF BREATH: 0
TROUBLE SWALLOWING: 0
SORE THROAT: 0
BACK PAIN: 0
APNEA: 1
CHOKING: 0
NAUSEA: 0
ABDOMINAL PAIN: 0
BLOOD IN STOOL: 0

## 2022-01-29 NOTE — PROGRESS NOTES
Nellie Beltran MD  General Surgery, Endoscopy  Chief Medical Officer    [facility] Steve Lozoya  [facility] [address] [address]  Dept: [phone]  Fax: 15 [address] Larsen  Chief Complaint   Patient presents with    Follow-up     Colonoscopy 10/26. Patient without complaints. HPI    Ms Vinay Kingsley for follow-up after endoscopy. DATE OF PROCEDURE:  10/26/2021     ENDOSCOPY REPORT     ATTENDING SURGEON:  Dr. Arthur Greenfield.     PRIMARY CARE PROVIDER:  JEANNE Hernandez     PREOPERATIVE DIAGNOSIS:  Screening colonoscopy.     POSTOPERATIVE DIAGNOSES:  1.  Large sessile cecal polyp. 2.  Grade 1 internal hemorrhoids.     PROCEDURES PERFORMED:  1. Colonoscopy, anus to terminal ileum. 2.  Large sessile cecal polypectomy.      INDICATIONS:  The patient is a pleasant 42-year-old white female  presenting for screening colonoscopy. No consistent GI complaints. Epigastric discomfort with reflux symptoms are well managed with  Prilosec. Colonoscopy in 2009 without polypectomy according to the  endoscopy report. BMI of 32. No family history of colon cancer. The  patient quit tobacco in 1982. At this time, screening colonoscopy is  indicated. She is doing well. No new complaints. Review of Systems   Constitutional: Negative for activity change, appetite change, chills, fever and unexpected weight change. HENT: Negative for nosebleeds, sneezing, sore throat and trouble swallowing. Eyes: Negative for visual disturbance. Respiratory: Positive for apnea. Negative for cough, choking and shortness of breath. Cardiovascular: Negative for chest pain, palpitations and leg swelling. Gastrointestinal: Negative for abdominal pain, blood in stool, nausea and vomiting. Genitourinary: Negative for dysuria, flank pain and hematuria. Musculoskeletal: Positive for arthralgias. Negative for back pain, gait problem and myalgias.    Allergic/Immunologic: Negative for immunocompromised state. Neurological: Negative for dizziness, seizures, syncope, weakness and headaches. Hematological: Does not bruise/bleed easily. Psychiatric/Behavioral: Positive for dysphoric mood. Negative for confusion and sleep disturbance. Past Medical History:   Diagnosis Date    Arthritis     Depression     Hyperlipidemia     Hypertension     Other and unspecified hyperlipidemia     Sleep apnea     Unspecified acute reaction to stress     Unspecified essential hypertension        Past Surgical History:   Procedure Laterality Date    COLONOSCOPY      COLONOSCOPY  10/26/2021    Dr. Aamir Mccarthy - polypectomy    COLONOSCOPY N/A 10/26/2021    COLONOSCOPY POLYPECTOMY REMOVAL HOT SNARE performed by Louise Nielsen MD at [facility]         Family History   Problem Relation Age of Onset    Other Mother         MANTEL CELL LYMPHOMA    Asthma Mother     Heart Attack Mother     High Blood Pressure Mother     High Cholesterol Mother     Stroke Mother     Thyroid Disease Mother     Kidney Disease Mother     Glaucoma Mother     Cancer Mother     Cancer Father         LUNG    Cancer Sister        Allergies:  See list    Current Outpatient Medications   Medication Sig Dispense Refill    atenolol (TENORMIN) 25 MG tablet TAKE 1 TABLET DAILY 90 tablet 1    simvastatin (ZOCOR) 40 MG tablet TAKE 1 TABLET EVERY EVENING 90 tablet 1    citalopram (CELEXA) 20 MG tablet TAKE 1 TABLET ONCE DAILY 90 tablet 1    omeprazole (PRILOSEC) 20 MG delayed release capsule TAKE 1 CAPSULE DAILY 90 capsule 1    hydroCHLOROthiazide (HYDRODIURIL) 50 MG tablet TAKE 1 TABLET DAILY 90 tablet 1     No current facility-administered medications for this visit.        Social History     Socioeconomic History    Marital status:      Spouse name: Not on file    Number of children: Not on file    Years of education: Not on file    Highest education level: Not on file   Occupational History    Not on file   Tobacco Use    Smoking status: Former Smoker     Packs/day: 1.00     Years: 6.00     Pack years: 6.00     Types: Cigarettes     Quit date:      Years since quittin.1    Smokeless tobacco: Never Used   Vaping Use    Vaping Use: Never used   Substance and Sexual Activity    Alcohol use: Yes     Alcohol/week: 4.0 standard drinks     Types: 4 Cans of beer per week     Comment: weekly    Drug use: No    Sexual activity: Yes     Partners: Male   Other Topics Concern    Not on file   Social History Narrative    Not on file     Social Determinants of Health     Financial Resource Strain: Low Risk     Difficulty of Paying Living Expenses: Not hard at all   Food Insecurity: No Food Insecurity    Worried About Running Out of Food in the Last Year: Never true    [address] in the Last Year: Never true   Transportation Needs: No Transportation Needs    Lack of Transportation (Medical): No    Lack of Transportation (Non-Medical):  No   Physical Activity: Insufficiently Active    Days of Exercise per Week: 3 days    Minutes of Exercise per Session: 30 min   Stress:     Feeling of Stress : Not on file   Social Connections:     Frequency of Communication with Friends and Family: Not on file    Frequency of Social Gatherings with Friends and Family: Not on file    Attends Mosque Services: Not on file    Active Member of Clubs or Organizations: Not on file    Attends Club or Organization Meetings: Not on file    Marital Status: Not on file   Intimate Partner Violence:     Fear of Current or Ex-Partner: Not on file    Emotionally Abused: Not on file    Physically Abused: Not on file    Sexually Abused: Not on file   Housing Stability:     Unable to Pay for Housing in the Last Year: Not on file    Number of [place] in the Last Year: Not on file    Unstable Housing in the Last Year: Not on file       LMP  (LMP Unknown)      Physical Exam  Vitals and nursing note reviewed. Constitutional:       General: She is not in acute distress. Appearance: She is well-developed. HENT:      Head: Normocephalic and atraumatic. Eyes:      General: No scleral icterus. Conjunctiva/sclera: Conjunctivae normal.      Pupils: Pupils are equal, round, and reactive to light. Neck:      Trachea: No tracheal deviation. Cardiovascular:      Rate and Rhythm: Normal rate. Pulmonary:      Effort: Pulmonary effort is normal. No respiratory distress. Skin:     General: Skin is warm and dry. Neurological:      Mental Status: She is alert and oriented to person, place, and time. Psychiatric:         Behavior: Behavior normal.         Thought Content: Thought content normal.         Judgment: Judgment normal.         IMAGING/LABS    SURGICAL PATHOLOGY REPORT  Order: [id number]   Status: Final result     Visible to patient: Yes (seen)     Next appt: 05/06/2022 at 08:15 AM in Internal Medicine (Jeison Tejeda, APRN - CNP)     0 Result Notes    Component 10/26/21 1227   Surgical Pathology Report -- Diagnosis --   CECUM POLYP, BIOPSIES:  TUBULAR ADENOMA. Aretha Langley M.D.   **Electronically Signed Out**         ajb/10/28/2021         Clinical Information   Pre-op Diagnosis:  SCREENING     Operative Findings:  CECUM POLYP   Operation Performed:  COLONOSCOPY, POLYPECTOMY REMOVAL HOT SNARE     Source of Specimen   1: CECUM POLYP     Gross Description   \"MARY GRACE MESNARD, CECUM POLYP\" Eight tan-white tissue fragments from   0.3 to 1.0 cm and are 2.2 x 1.8 x 0.4 cm in aggregate.  Entirely 1cs. jg tm       Microscopic Description   Microscopic examination performed. SURGICAL PATHOLOGY CONSULTATION         Patient Name: Luis Alberto Butts    Med Rec: [id number]   Path Number: [id number]     [facility]   [facility]   ANATOMIC PATHOLOGY   [address] [place] [address] [address] [place], [address]   [phone]   Fax: [phone]             ASSESSMENT     Diagnosis Orders   1. S/P colonoscopy with polypectomy     2. Adenomatous polyp of cecum     3. Grade I internal hemorrhoids     4. BMI 30.0-30.9,adult         PLAN    Endoscopic findings of large adenomatous sessile cecal polypectomy, grade 1 internal hemorrhoids reviewed with Ms. Olmedo. Recommend next screening colonoscopy in 1 to 2 years, age 73-68.   Discussed importance of a healthy, balanced high-fiber low-fat diet, with fiber supplementation, increased water intake and physical activity, decrease calories and sugar, etc.     Ray Farmer MD

## 2022-06-15 ENCOUNTER — HOSPITAL ENCOUNTER (OUTPATIENT)
Dept: WOMENS IMAGING | Age: 67
Discharge: HOME | End: 2022-06-17
Payer: MEDICARE

## 2022-06-15 DIAGNOSIS — Z12.31 SCREENING MAMMOGRAM FOR HIGH-RISK PATIENT: ICD-10-CM

## 2022-06-15 PROCEDURE — 77067 SCR MAMMO BI INCL CAD: CPT

## 2022-11-03 ENCOUNTER — OFFICE VISIT (OUTPATIENT)
Dept: PULMONOLOGY | Age: 67
End: 2022-11-03
Payer: MEDICARE

## 2022-11-03 VITALS
HEIGHT: 61 IN | BODY MASS INDEX: 23.81 KG/M2 | RESPIRATION RATE: 16 BRPM | TEMPERATURE: 96.7 F | OXYGEN SATURATION: 99 % | HEART RATE: 63 BPM | WEIGHT: 126.1 LBS | SYSTOLIC BLOOD PRESSURE: 152 MMHG | DIASTOLIC BLOOD PRESSURE: 97 MMHG

## 2022-11-03 DIAGNOSIS — I10 ESSENTIAL HYPERTENSION: ICD-10-CM

## 2022-11-03 DIAGNOSIS — G47.33 OSA ON CPAP: Primary | ICD-10-CM

## 2022-11-03 DIAGNOSIS — Z99.89 OSA ON CPAP: Primary | ICD-10-CM

## 2022-11-03 PROCEDURE — 1036F TOBACCO NON-USER: CPT | Performed by: INTERNAL MEDICINE

## 2022-11-03 PROCEDURE — G8427 DOCREV CUR MEDS BY ELIG CLIN: HCPCS | Performed by: INTERNAL MEDICINE

## 2022-11-03 PROCEDURE — 1123F ACP DISCUSS/DSCN MKR DOCD: CPT | Performed by: INTERNAL MEDICINE

## 2022-11-03 PROCEDURE — 3017F COLORECTAL CA SCREEN DOC REV: CPT | Performed by: INTERNAL MEDICINE

## 2022-11-03 PROCEDURE — 99212 OFFICE O/P EST SF 10 MIN: CPT | Performed by: INTERNAL MEDICINE

## 2022-11-03 PROCEDURE — 3078F DIAST BP <80 MM HG: CPT | Performed by: INTERNAL MEDICINE

## 2022-11-03 PROCEDURE — 3074F SYST BP LT 130 MM HG: CPT | Performed by: INTERNAL MEDICINE

## 2022-11-03 PROCEDURE — G8420 CALC BMI NORM PARAMETERS: HCPCS | Performed by: INTERNAL MEDICINE

## 2022-11-03 PROCEDURE — 1090F PRES/ABSN URINE INCON ASSESS: CPT | Performed by: INTERNAL MEDICINE

## 2022-11-03 PROCEDURE — 99213 OFFICE O/P EST LOW 20 MIN: CPT | Performed by: INTERNAL MEDICINE

## 2022-11-03 PROCEDURE — G8399 PT W/DXA RESULTS DOCUMENT: HCPCS | Performed by: INTERNAL MEDICINE

## 2022-11-03 PROCEDURE — G8484 FLU IMMUNIZE NO ADMIN: HCPCS | Performed by: INTERNAL MEDICINE

## 2022-11-03 NOTE — PROGRESS NOTES
OUTPATIENT PULMONARY PROGRESS NOTE      Patient:  Jose Luis Son  MRN: [id number]    Consulting Physician: Dr Feng Simons  Reason for Consult: FABIEN  Primacy Care Physician: JUAN Lorenzana CNP    HISTORY OF PRESENT ILLNESS:   The patient is a 79 y.o. female for follow-up of sleep apnea    She is here for follow-up of sleep apnea. She was seen last time 1 year ago. According to patient she has been using CPAP. She is trying her best but having difficult getting a better seal with a nasal mask she was on nasal pillows in the past it was changed to nasal mask and according to patient she had tried different 1 but still have the same problem. Sometimes she cannot use it as she has difficulty sleeping sometimes at night. When she get a good seal she has a better sleep and she feels fresh in the morning she does not usually take nap in the daytime. According to patient she had lost 28 pounds. She has some dryness in the mouth in the morning. CPAP compliance data is available and average usage for last 30 days is 6 hours 2 minutes and compliance is 76% average AHI is 1.82 events per hour. This compliance data is from October 2 to October 31, 2022 for 30 days. Sleep questionnaire on CPAP on 11/03/2022  No snoring at night, does not wakes herself snoring. Has no witnessed apnea. Does not wake up with gasping or choking sensation. Okay dry mouth upon awakening. Negative fatigue and tiredness during the day. Goes to sleep at 10 pm, wakes up 6 am. It takes 10 minutes to fall asleep. Occasionally wake up at night to go to bathroom. Takes no nap during the day. No headache in am. No car wrecks or near wrecks because of the sleepiness. No nodding off while driving. No weight gain. Positive forgetfulness or decreased concentration. No nasal congestion or obstruction at night. No significant caffienated drinks or alcohol. Using CPAP 8 hr/night. No leg jerks during sleep. No restless feelings in legs at night. No numbness or burning in leg or feet. No leg aches or cramps . Initial history and evaluation  She was diagnosed with obstructive sleep apnea approximately 10 years ago when she had been followed by Dr. Zaira Iyer. Her last sleep study approximately was that time and she has been using CPAP almost 90% of the time according to patient no sleep data is available no sleep studies available no compliance data is available. According to patient she use CPAP with nasal pillows. She had history of snoring she was noted to have desaturation while she was in the hospital and she had awakening with a snoring and feeling of tiredness and fatigue during the daytime and that however she had a sleep study done and was diagnosed with sleep apnea. According to patient with a CPAP also she still feels like that she is tired and fatigue morning sleep is not safe refreshing, she does still wake up with a snoring and her  told her that she still have episodes of apnea she does not take nap during the daytime and usually she does not doze off during the daytime. She denies shortness of breath, wheezing chest tightness. She denies history of asthma or COPD. She is a non-smoker history of smoking only for 4 years remote history. No flowsheet data found.     ESS: 0  Sitting and reading 0  Watching TV 0  Sitting inactive in a public place (e.g a theater or a meeting) 0  As a passenger in a car for an hour without a break 0  Lying down to rest in the afternoon when circumstances permit 0  Sitting and talking to someone 0  Sitting quietly after a lunch without alcohol 0  In a car, while stopped for a few minutes in traffic0    Past Medical History:        Diagnosis Date    Arthritis     Depression     Hyperlipidemia     Hypertension     Other and unspecified hyperlipidemia     Sleep apnea     Unspecified acute reaction to stress     Unspecified essential hypertension        Past Surgical History:        Procedure Laterality Date    COLONOSCOPY      COLONOSCOPY  10/26/2021    Dr. Laboy  - polypectomy    COLONOSCOPY N/A 10/26/2021    COLONOSCOPY POLYPECTOMY REMOVAL HOT SNARE performed by Axel Leo MD at [address] [place] [address] (CERVIX STATUS UNKNOWN)      TUBAL LIGATION         Allergies: Allergies   Allergen Reactions    Keflex [Cephalexin]      RASH    Sulfa Antibiotics          Home Meds:   Outpatient Encounter Medications as of 11/3/2022   Medication Sig Dispense Refill    citalopram (CELEXA) 20 MG tablet TAKE 1 TABLET ONCE DAILY 90 tablet 1    hydroCHLOROthiazide (HYDRODIURIL) 50 MG tablet TAKE 1 TABLET DAILY 90 tablet 1    omeprazole (PRILOSEC) 20 MG delayed release capsule TAKE 1 CAPSULE DAILY 90 capsule 1    atenolol (TENORMIN) 25 MG tablet Take 1 tablet by mouth in the morning. 90 tablet 1    simvastatin (ZOCOR) 40 MG tablet Take 1 tablet by mouth nightly 90 tablet 1     No facility-administered encounter medications on file as of 11/3/2022. Social History:   TOBACCO:   reports that she quit smoking about 40 years ago. Her smoking use included cigarettes. She has a 6.00 pack-year smoking history. She has never used smokeless tobacco.  ETOH:   reports current alcohol use of about 4.0 standard drinks per week.   OCCUPATION:      Family History:       Problem Relation Age of Onset    Other Mother         MANTEL CELL LYMPHOMA    Asthma Mother     Heart Attack Mother     High Blood Pressure Mother     High Cholesterol Mother     Stroke Mother     Thyroid Disease Mother     Kidney Disease Mother     Glaucoma Mother     Cancer Mother     Cancer Father         LUNG    Cancer Sister        Immunizations:    Immunization History   Administered Date(s) Administered    Pneumococcal Polysaccharide ([id number]) 05/06/2022    Td, unspecified formulation 11/29/2012    Zoster Recombinant (Shingrix) 08/29/2021, 12/02/2021         REVIEW OF SYSTEMS:  CONSTITUTIONAL:  negative for  fevers, chills, sweats, fatigue, anorexia, and weight loss  EYES:  negative for  double vision, blurred vision, dry eyes, eye discharge, visual disturbance, redness, and icterus  HEENT:  negative for  hearing loss, tinnitus, ear drainage, earaches, nasal congestion, epistaxis, sore throat, hoarseness, voice change, and postnasal drip  RESPIRATORY:  negative for  dry cough, cough with sputum, dyspnea, wheezing, hemoptysis, chest pain, and pleuritic pain  CARDIOVASCULAR:  negative for  chest pain, dyspnea, palpitations, orthopnea, PND, exertional chest pressure/discomfort, fatigue, edema, syncope  GASTROINTESTINAL:  negative for nausea, vomiting, diarrhea, constipation, abdominal pain, abdominal mass, abdominal distention, jaundice, dysphagia, reflux, odynophagia, hematemesis, and hemtochezia  GENITOURINARY:  negative for frequency, dysuria, nocturia, and hematuria  HEMATOLOGIC/LYMPHATIC:  negative for easy bruising, bleeding, lymphadenopathy, and petechiae  ALLERGIC/IMMUNOLOGIC:  negative for recurrent infections, urticaria, hay fever, angioedema, anaphylaxis, and drug reactions  ENDOCRINE:  negative for heat intolerance, cold intolerance, tremor, and weight changes  MUSCULOSKELETAL:  negative for  myalgias, arthralgias, joint swelling, stiff joints, and muscle weakness  NEUROLOGICAL:  negative for headaches, dizziness, seizures, memory problems, speech problems, visual disturbance, gait problems, tremor, dysphagia, weakness, numbness, syncope, and tingling  BEHAVIOR/PSYCH:  negative for decreased sleep, decreased energy level, increased energy level, poor concentration, depressed mood, and anxiety          Physical Exam:    Vitals: BP (!) 152/97   Pulse 63   Temp (!) 96.7 °F (35.9 °C)   Resp 16   Ht 5' 1\" (1.549 m)   Wt 126 lb 1.6 oz (57.2 kg)   LMP  (LMP Unknown)   SpO2 99%   BMI 23.83 kg/m²   Last 3 weights:    Wt Readings from Last 3 Encounters:   11/03/22 126 lb 1.6 oz (57.2 kg)   05/06/22 153 lb (69.4 kg)   11/04/21 158 lb 12.8 oz (72 kg)     Body mass index is 23.83 kg/m². Physical Examination:   General appearance - alert, well appearing, and in no distress, normal appearing weight and acyanotic, in no respiratory distress  Mental status - alert, oriented to person, place, and time  Eyes - pupils equal and reactive, extraocular eye movements intact, sclera anicteric, left eye normal, right eye normal  Ears - right ear normal, left ear normal  Nose - normal and patent, no erythema, discharge or polyps  Mouth - mucous membranes moist, pharynx normal without lesions and small oropharynx, small chin, large tongue, Mallampati 3  Neck - supple, no significant adenopathy  Chest - no tachypnea, retractions or cyanosis bilateral symmetrical chest movement, normal resonance on percussion, air entry is present bilaterally and symmetrical, no expiratory wheezing rhonchi or crackles.   Heart - normal rate, regular rhythm, normal S1, S2, no murmurs, rubs, clicks or gallops  Abdomen - soft, nontender, nondistended, no masses or organomegaly  Neurological - alert, oriented, normal speech, no focal findings or movement disorder noted  Extremities - peripheral pulses normal, no pedal edema, no clubbing or cyanosis  Skin - normal coloration and turgor, no rashes, no suspicious skin lesions noted       LABS:    CBC:   WBC   Date Value Ref Range Status   11/02/2022 5.3 3.5 - 11.0 K/uL Final   10/22/2021 5.7 3.5 - 11.3 k/uL Final   10/29/2020 6.3 3.5 - 11.3 k/uL Final   12/26/2012 7.6 3.5 - 11.0 k/uL Final     Hemoglobin   Date Value Ref Range Status   11/02/2022 13.9 11.5 - 15.5 g/dL Final   10/22/2021 13.2 11.9 - 15.1 g/dL Final   10/29/2020 14.2 11.9 - 15.1 g/dL Final     Platelets   Date Value Ref Range Status   11/02/2022 224 130 - 400 K/uL Final   10/22/2021 201 138 - 453 k/uL Final   10/29/2020 206 138 - 453 k/uL Final   12/26/2012 208 140 - 450 k/uL Final     BMP:     Hepatic:     Amylase: No results found for: AMYLASE  Lipase: No results found for: LIPASE  CARDIAC ENZYMES: No results found for: CKTOTAL, CKMB, CKMBINDEX, TROPONINI  BNP: No results found for: BNP  Lipids:       INR: No results found for: INR  Thyroid:   TSH   Date Value Ref Range Status   05/16/2022 3.55 0.49 - 4.67 uIU/mL Final     Comment:     Performed at [facility]  [address]       Urinalysis:     Cultures:-  -----------------------------------------------------------------    ABGs: No results found for: PHART, PO2ART, [id number]    Pulmonary Functions Testing Results:    No results found for: FEV1, FVC, [id number], TLC, DLCO        Assessment and Plan       ICD-10-CM    1. FABIEN on CPAP  G47.33     Z99.89       2. Essential hypertension  I10             Assessment:    Compliance data seen and she is very compliant with CPAP. She is tolerating CPAP with nasal pillows compliance is good. Compliance data reviewed and her compliance is 97% average usage is 7 hours and 57 minutes with AHI of 2.0. She feels like that the CPAP pressure is slightly low will increase CPAP pressure to 9 and if she feels like that 9 is high then we will turn it back down to 8. Advise weight loss, increase activity exercise sleep hygiene discussed    Plan and recommendation:    Decrease CPAP pressure to 8 cm   Prescription for nasal pillows as may try nasal pillows again. Patient is currently on nasal mask. Continue current CPAP at least 4 hrs qhs  Wt loss is recommended and discussed  Follow good sleep hygeine instructions  Use humidifier  Questions answered pertaining to diagnosis and management explained importance of compliance with therapy   Compliance data reviewed  Need compliance data before next visit.     COVID vaccination and annual flu vaccination recommended  Vaccination recommendation given regarding pneumonia vaccine  Vaccinations recommended  Up to date with vaccinations from pulm perspective   Maintain an active lifestyle     We will see her in office in 1 year.    Please note that this chart was generated using voice recognition Dragon dictation software. Although every effort was made to ensure the accuracy of this automated transcription, some errors in transcription may have occurred. It was my pleasure to evaluate Lisajen Welcha today. Please call with questions.     Quincy Stanton MD, MD             11/3/2022, 8:53 AM

## 2022-11-03 NOTE — PATIENT INSTRUCTIONS
SURVEY:    You may be receiving a survey from [employer] regarding your visit today. Please complete the survey to enable us to provide the highest quality of care to you and your family. If you cannot score us a very good on any question, please call the office to discuss how we could have made your experience a very good one. Thank you. Please recheck your blood pressure when you go home and make sure you take your prescribed medication if applicable . Please follow up with your PCP or ER if needed.

## 2023-05-11 PROBLEM — F33.0 MAJOR DEPRESSIVE DISORDER, RECURRENT, MILD (HCC): Status: ACTIVE | Noted: 2023-05-11

## 2023-07-12 ENCOUNTER — HOSPITAL ENCOUNTER (OUTPATIENT)
Dept: WOMENS IMAGING | Age: 68
Discharge: HOME | End: 2023-07-14
Payer: MEDICARE

## 2023-07-12 DIAGNOSIS — Z12.31 SCREENING MAMMOGRAM FOR HIGH-RISK PATIENT: ICD-10-CM

## 2023-07-12 PROCEDURE — 77063 BREAST TOMOSYNTHESIS BI: CPT

## 2023-08-03 DIAGNOSIS — Z01.818 PRE-OP TESTING: Primary | ICD-10-CM

## 2023-11-03 NOTE — PROGRESS NOTES
Patient states they received their colon prep instructions and home medications that are to be taken on the day of their procedure with a small sip of water only, from the physician's office. Instructed pt to stop taking mobic 7 days prior to surgery and to take celexa and atenolol with a small sip of water prior to arriving to the hospital the day of surgery. Pt will complete pre-op EKG 11/8/23.

## 2023-11-08 ENCOUNTER — HOSPITAL ENCOUNTER (OUTPATIENT)
Age: 68
Discharge: HOME | End: 2023-11-08
Payer: MEDICARE

## 2023-11-08 DIAGNOSIS — Z01.818 PRE-OP TESTING: ICD-10-CM

## 2023-11-08 PROCEDURE — 93005 ELECTROCARDIOGRAM TRACING: CPT

## 2023-11-09 LAB
EKG ATRIAL RATE: 58 BPM
EKG P AXIS: 54 DEGREES
EKG P-R INTERVAL: 206 MS
EKG Q-T INTERVAL: 440 MS
EKG QRS DURATION: 86 MS
EKG QTC CALCULATION (BAZETT): 431 MS
EKG R AXIS: 51 DEGREES
EKG T AXIS: 45 DEGREES
EKG VENTRICULAR RATE: 58 BPM

## 2023-11-09 PROCEDURE — 93010 ELECTROCARDIOGRAM REPORT: CPT | Performed by: INTERNAL MEDICINE

## 2023-11-16 ENCOUNTER — ANESTHESIA EVENT (OUTPATIENT)
Dept: OPERATING ROOM | Age: 68
End: 2023-11-16
Payer: MEDICARE

## 2023-11-17 ENCOUNTER — ANESTHESIA (OUTPATIENT)
Dept: OPERATING ROOM | Age: 68
End: 2023-11-17
Payer: MEDICARE

## 2023-11-17 ENCOUNTER — HOSPITAL ENCOUNTER (OUTPATIENT)
Age: 68
Setting detail: OUTPATIENT SURGERY
Discharge: HOME | End: 2023-11-17
Attending: SURGERY | Admitting: SURGERY
Payer: MEDICARE

## 2023-11-17 VITALS
RESPIRATION RATE: 16 BRPM | WEIGHT: 140 LBS | TEMPERATURE: 97.6 F | DIASTOLIC BLOOD PRESSURE: 72 MMHG | BODY MASS INDEX: 26.43 KG/M2 | OXYGEN SATURATION: 99 % | SYSTOLIC BLOOD PRESSURE: 162 MMHG | HEART RATE: 55 BPM | HEIGHT: 61 IN

## 2023-11-17 PROCEDURE — G0105 COLORECTAL SCRN; HI RISK IND: HCPCS | Performed by: SURGERY

## 2023-11-17 PROCEDURE — 2580000003 HC RX 258: Performed by: NURSE ANESTHETIST, CERTIFIED REGISTERED

## 2023-11-17 PROCEDURE — 2709999900 HC NON-CHARGEABLE SUPPLY: Performed by: SURGERY

## 2023-11-17 PROCEDURE — 3700000000 HC ANESTHESIA ATTENDED CARE: Performed by: SURGERY

## 2023-11-17 PROCEDURE — 7100000011 HC PHASE II RECOVERY - ADDTL 15 MIN: Performed by: SURGERY

## 2023-11-17 PROCEDURE — 2500000003 HC RX 250 WO HCPCS: Performed by: NURSE ANESTHETIST, CERTIFIED REGISTERED

## 2023-11-17 PROCEDURE — 7100000010 HC PHASE II RECOVERY - FIRST 15 MIN: Performed by: SURGERY

## 2023-11-17 PROCEDURE — 6360000002 HC RX W HCPCS: Performed by: NURSE ANESTHETIST, CERTIFIED REGISTERED

## 2023-11-17 PROCEDURE — 3700000001 HC ADD 15 MINUTES (ANESTHESIA): Performed by: SURGERY

## 2023-11-17 PROCEDURE — 3609027000 HC COLONOSCOPY: Performed by: SURGERY

## 2023-11-17 RX ORDER — SODIUM CHLORIDE, SODIUM LACTATE, POTASSIUM CHLORIDE, CALCIUM CHLORIDE 600; 310; 30; 20 MG/100ML; MG/100ML; MG/100ML; MG/100ML
INJECTION, SOLUTION INTRAVENOUS CONTINUOUS
Status: DISCONTINUED | OUTPATIENT
Start: 2023-11-17 | End: 2023-11-17 | Stop reason: HOSPADM

## 2023-11-17 RX ORDER — LIDOCAINE HYDROCHLORIDE 20 MG/ML
INJECTION, SOLUTION EPIDURAL; INFILTRATION; INTRACAUDAL; PERINEURAL PRN
Status: DISCONTINUED | OUTPATIENT
Start: 2023-11-17 | End: 2023-11-17 | Stop reason: SDUPTHER

## 2023-11-17 RX ORDER — PROPOFOL 10 MG/ML
INJECTION, EMULSION INTRAVENOUS CONTINUOUS PRN
Status: DISCONTINUED | OUTPATIENT
Start: 2023-11-17 | End: 2023-11-17 | Stop reason: SDUPTHER

## 2023-11-17 RX ORDER — SODIUM CHLORIDE, SODIUM LACTATE, POTASSIUM CHLORIDE, CALCIUM CHLORIDE 600; 310; 30; 20 MG/100ML; MG/100ML; MG/100ML; MG/100ML
INJECTION, SOLUTION INTRAVENOUS CONTINUOUS PRN
Status: DISCONTINUED | OUTPATIENT
Start: 2023-11-17 | End: 2023-11-17 | Stop reason: SDUPTHER

## 2023-11-17 RX ADMIN — SODIUM CHLORIDE, POTASSIUM CHLORIDE, SODIUM LACTATE AND CALCIUM CHLORIDE: 600; 310; 30; 20 INJECTION, SOLUTION INTRAVENOUS at 10:45

## 2023-11-17 RX ADMIN — LIDOCAINE HYDROCHLORIDE 5 ML: 20 INJECTION, SOLUTION EPIDURAL; INFILTRATION; INTRACAUDAL; PERINEURAL at 11:43

## 2023-11-17 RX ADMIN — SODIUM CHLORIDE, POTASSIUM CHLORIDE, SODIUM LACTATE AND CALCIUM CHLORIDE: 600; 310; 30; 20 INJECTION, SOLUTION INTRAVENOUS at 11:39

## 2023-11-17 RX ADMIN — PROPOFOL 150 MCG/KG/MIN: 10 INJECTION, EMULSION INTRAVENOUS at 11:43

## 2023-11-17 ASSESSMENT — LIFESTYLE VARIABLES: SMOKING_STATUS: 0

## 2023-11-17 NOTE — ANESTHESIA POSTPROCEDURE EVALUATION
Department of Anesthesiology  Postprocedure Note    Patient: Kermit Charlton  MRN: [id number]  YOB: 1955  Date of evaluation: 11/17/2023      Procedure Summary     Date: 11/17/23 Room / Location: [address] [place] [address]    Anesthesia Start: 1139 Anesthesia Stop: 5214    Procedure: colonoscopy (Abdomen) Diagnosis:       Screen for colon cancer      (Screen for colon cancer [Z12.11])    Surgeons: Raya Ngo DO Responsible Provider: JUAN Brand CRNA    Anesthesia Type: general, TIVA ASA Status: 3          Anesthesia Type: No value filed.     Jennifer Phase I:      Jennifer Phase II: Jennifer Score: 10      Anesthesia Post Evaluation    Patient location during evaluation: PACU  Patient participation: complete - patient participated  Level of consciousness: awake  Airway patency: patent  Nausea & Vomiting: no vomiting and no nausea  Complications: no  Cardiovascular status: blood pressure returned to baseline and hemodynamically stable  Respiratory status: acceptable, spontaneous ventilation and room air  Hydration status: stable  Pain management: satisfactory to patient

## 2023-11-17 NOTE — OP NOTE
Operative Note      Patient: David Cardenas  YOB: 1955  MRN: [id number]  Tanya Castañeda APRN - CNP      Date of Procedure: 11/17/2023    Pre-Op Diagnosis Codes:     * Screen for colon cancer [Z12.11]    History large cecal polyp      Post-Op Diagnosis: [address] [facility] [address] OR [facility]:[id number]}       Procedure:    Colonoscopy to cecum ***      Surgeon(s):  Jessika Cherry DO    Assistant:   * No surgical staff found *    Anesthesia: Monitor Anesthesia Care    Estimated Blood Loss (mL): {NUMBERS; MHO:21567}    Complications: {Symptoms;  Intra-op complications:85862}    Specimens:   * No specimens in log *    Implants:  * No implants in log *      Drains: * No LDAs found *    Findings: ***              Detailed Description of Procedure:   ***      Refer to Dr Keri Mitchell***    Electronically signed by Jessika Cherry DO, FACOS, FACS on 11/17/2023 at 12:03 PM

## 2023-11-17 NOTE — H&P
exam at scope  Ext: Moves all extremities, no gross focal motor deficits  Skin: No erythema or ulcerations     Labs:   Lab Results   Component Value Date/Time    WBC 4.8 10/30/2023 08:50 AM    WBC 5.7 10/22/2021 09:13 AM    HGB 13.9 10/30/2023 08:50 AM    HCT 40.7 10/30/2023 08:50 AM    MCV 89.1 10/30/2023 08:50 AM     10/30/2023 08:50 AM     10/22/2021 09:13 AM     Lab Results   Component Value Date/Time     10/30/2023 08:50 AM    K 4.4 10/30/2023 08:50 AM    CL 97 10/30/2023 08:50 AM    CO2 30 10/30/2023 08:50 AM    BUN 14 10/30/2023 08:50 AM    CREATININE 0.87 10/30/2023 08:50 AM    GLUCOSE 99 10/30/2023 08:50 AM    CALCIUM 9.7 10/30/2023 08:50 AM     Lab Results   Component Value Date/Time    ALKPHOS 66 10/30/2023 08:50 AM    ALKPHOS 68 10/22/2021 09:13 AM    ALT 25 10/30/2023 08:50 AM    AST 23 10/30/2023 08:50 AM    PROT 7.0 10/30/2023 08:50 AM    BILITOT 0.5 10/30/2023 08:50 AM    LABALBU 5.0 10/30/2023 08:50 AM     No results found for: \"AMYLASE\"  No results found for: \"LIPASE\"  No results found for: \"INR\"    Radiologic Studies:      Impressions/Recommendations:     History large cecal sessile polyp 2019 Dr. Zeke Stevens he wanted repeat scope in 1-2 years. Tubular adenoma. Screen colonoscopy  Risks and benefits of colonoscopy have been discussed in detail with the patient. Risks of the procedure include bleeding, bowel perforation, possibly missing smaller lesions if the bowel prep is not adequate. Alternative studies include barium enema and virtual colonoscopy; however, if a lesion is seen, then one would still need to proceed with the gold standard colonoscopy to retrieve or biopsy the lesion. All the patient's questions are answered. Will proceed with colonoscopy under MAC.      H&P  General Surgery        Pt Name: Valerie Kamara  MRN: [id number]  YOB: 1955  Date of evaluation: 11/17/2023      [x] I have examined the patient and reviewed the H&P/Consult completed, and there are no changes to the patient or plans. [] I have examined the patient and reviewed the H&P/Consult and have noted the following changes: The patient was counseled at length about the risks of sharon Covid-19 during their perioperative period and any recovery window from their procedure. The patient was made aware that sharon Covid-19  may worsen their prognosis for recovering from their procedure  and lend to a higher morbidity and/or mortality risk. All material risks, benefits, and reasonable alternatives including postponing the procedure were discussed. The patient does wish to proceed with the procedure at this time.         Electronically signed by Jean Pierre Jaramillo DO  on 11/17/2023 at 11:17 AM

## 2023-11-17 NOTE — PROGRESS NOTES
Discharge Criteria    Inpatients must meet Criteria 1 through 7. All other patients are either YES or N/A. If a NO is chosen then Anesthesia or Surgeon must be notified. 1.  Minimum 30 minutes after last dose of sedative medication. Yes      2. Systolic BP between 90 - 968. Diastolic BP between 60 - 90. Yes      3. Pulse between 60 - 120    Yes      4. Respirations between 8 - 25. Yes      5. SpO2 92% - 100%. Yes      6. Able to cough and swallow or return to baseline function. Yes      7. Alert and oriented or return to baseline mental status. Yes      8. Demonstrates controlled, coordinated movements, ambulates with steady gait, or return to baseline activity function. Yes      9. Minimal or no pain or nausea, or at a level tolerable and acceptable to patient. Yes      10. Takes and retains oral fluids as allowed. Yes      11. Procedural / perioperative site stable. Minimal or no bleeding. Yes          12. If GI endoscopy procedure, minimal or no abdominal distention or passing flatus. Yes      13. Written discharge instructions and emergency telephone number provided. Yes      14. Accompanied by a responsible adult.     Yes

## 2023-11-27 DIAGNOSIS — D12.0 ADENOMATOUS POLYP OF CECUM: Primary | ICD-10-CM

## 2023-11-29 ENCOUNTER — TELEPHONE (OUTPATIENT)
Dept: GASTROENTEROLOGY | Age: 68
End: 2023-11-29

## 2023-11-29 NOTE — TELEPHONE ENCOUNTER
TBS for Colon EMR/Dx:  Polyp cecum  Referring:  Dr Earline Lujan [facility] for patient to return call to schedule procedure.

## 2023-11-29 NOTE — TELEPHONE ENCOUNTER
Procedure scheduled/K[facility]  ColonEMR  Dx:  Polyp cecum  Ordering:  Dr Jose Francisco Angelo  1/3/24   11:30am/arrive 10:00am     [address] [facility]    Miralax bowel prep instructions mailed to patient.   (Patient preference)    Patient advised by phone/mail

## 2024-01-02 RX ORDER — ACETAMINOPHEN 325 MG/1
650 TABLET ORAL EVERY 6 HOURS PRN
COMMUNITY

## 2024-01-03 ENCOUNTER — ANESTHESIA EVENT (OUTPATIENT)
Dept: OPERATING ROOM | Age: 69
End: 2024-01-03
Payer: MEDICARE

## 2024-01-03 ENCOUNTER — ANESTHESIA (OUTPATIENT)
Dept: OPERATING ROOM | Age: 69
End: 2024-01-03
Payer: MEDICARE

## 2024-01-03 ENCOUNTER — HOSPITAL ENCOUNTER (OUTPATIENT)
Age: 69
Setting detail: OUTPATIENT SURGERY
Discharge: HOME | End: 2024-01-03
Attending: INTERNAL MEDICINE | Admitting: INTERNAL MEDICINE
Payer: MEDICARE

## 2024-01-03 VITALS
OXYGEN SATURATION: 99 % | BODY MASS INDEX: 27.38 KG/M2 | RESPIRATION RATE: 16 BRPM | DIASTOLIC BLOOD PRESSURE: 81 MMHG | SYSTOLIC BLOOD PRESSURE: 144 MMHG | TEMPERATURE: 97 F | HEIGHT: 61 IN | WEIGHT: 145 LBS | HEART RATE: 56 BPM

## 2024-01-03 DIAGNOSIS — K63.5 POLYP OF CECUM: ICD-10-CM

## 2024-01-03 LAB
BUN BLD-MCNC: 9 MG/DL (ref 8–26)
CHLORIDE BLD-SCNC: 100 MMOL/L (ref 98–107)
EGFR, POC: >60 ML/MIN/1.73M2
GLUCOSE BLD-MCNC: 104 MG/DL (ref 74–100)
POC CREATININE: 0.7 MG/DL (ref 0.51–1.19)
POTASSIUM BLD-SCNC: 3.5 MMOL/L (ref 3.5–4.5)
SODIUM BLD-SCNC: 138 MMOL/L (ref 138–146)

## 2024-01-03 PROCEDURE — 84520 ASSAY OF UREA NITROGEN: CPT

## 2024-01-03 PROCEDURE — 88305 TISSUE EXAM BY PATHOLOGIST: CPT

## 2024-01-03 PROCEDURE — 84132 ASSAY OF SERUM POTASSIUM: CPT

## 2024-01-03 PROCEDURE — 82435 ASSAY OF BLOOD CHLORIDE: CPT

## 2024-01-03 PROCEDURE — 3700000001 HC ADD 15 MINUTES (ANESTHESIA): Performed by: INTERNAL MEDICINE

## 2024-01-03 PROCEDURE — 7100000011 HC PHASE II RECOVERY - ADDTL 15 MIN: Performed by: INTERNAL MEDICINE

## 2024-01-03 PROCEDURE — 3700000000 HC ANESTHESIA ATTENDED CARE: Performed by: INTERNAL MEDICINE

## 2024-01-03 PROCEDURE — 2580000003 HC RX 258: Performed by: NURSE ANESTHETIST, CERTIFIED REGISTERED

## 2024-01-03 PROCEDURE — 84295 ASSAY OF SERUM SODIUM: CPT

## 2024-01-03 PROCEDURE — 82947 ASSAY GLUCOSE BLOOD QUANT: CPT

## 2024-01-03 PROCEDURE — 82565 ASSAY OF CREATININE: CPT

## 2024-01-03 PROCEDURE — 7100000010 HC PHASE II RECOVERY - FIRST 15 MIN: Performed by: INTERNAL MEDICINE

## 2024-01-03 PROCEDURE — 3609010200 HC COLONOSCOPY ABLATION TUMOR POLYP/OTHER LES: Performed by: INTERNAL MEDICINE

## 2024-01-03 PROCEDURE — 2709999900 HC NON-CHARGEABLE SUPPLY: Performed by: INTERNAL MEDICINE

## 2024-01-03 PROCEDURE — 6360000002 HC RX W HCPCS: Performed by: NURSE ANESTHETIST, CERTIFIED REGISTERED

## 2024-01-03 PROCEDURE — 2500000003 HC RX 250 WO HCPCS: Performed by: NURSE ANESTHETIST, CERTIFIED REGISTERED

## 2024-01-03 RX ORDER — FENTANYL CITRATE 50 UG/ML
50 INJECTION, SOLUTION INTRAMUSCULAR; INTRAVENOUS EVERY 5 MIN PRN
Status: DISCONTINUED | OUTPATIENT
Start: 2024-01-03 | End: 2024-01-03 | Stop reason: HOSPADM

## 2024-01-03 RX ORDER — FENTANYL CITRATE 50 UG/ML
25 INJECTION, SOLUTION INTRAMUSCULAR; INTRAVENOUS EVERY 5 MIN PRN
Status: DISCONTINUED | OUTPATIENT
Start: 2024-01-03 | End: 2024-01-03 | Stop reason: HOSPADM

## 2024-01-03 RX ORDER — SODIUM CHLORIDE 0.9 % (FLUSH) 0.9 %
5-40 SYRINGE (ML) INJECTION PRN
Status: DISCONTINUED | OUTPATIENT
Start: 2024-01-03 | End: 2024-01-03 | Stop reason: HOSPADM

## 2024-01-03 RX ORDER — SODIUM CHLORIDE 0.9 % (FLUSH) 0.9 %
5-40 SYRINGE (ML) INJECTION EVERY 12 HOURS SCHEDULED
Status: DISCONTINUED | OUTPATIENT
Start: 2024-01-03 | End: 2024-01-03 | Stop reason: HOSPADM

## 2024-01-03 RX ORDER — SODIUM CHLORIDE 9 MG/ML
INJECTION, SOLUTION INTRAVENOUS PRN
Status: DISCONTINUED | OUTPATIENT
Start: 2024-01-03 | End: 2024-01-03 | Stop reason: HOSPADM

## 2024-01-03 RX ORDER — PROPOFOL 10 MG/ML
INJECTION, EMULSION INTRAVENOUS PRN
Status: DISCONTINUED | OUTPATIENT
Start: 2024-01-03 | End: 2024-01-03 | Stop reason: SDUPTHER

## 2024-01-03 RX ORDER — PHENYLEPHRINE HCL IN 0.9% NACL 1 MG/10 ML
SYRINGE (ML) INTRAVENOUS PRN
Status: DISCONTINUED | OUTPATIENT
Start: 2024-01-03 | End: 2024-01-03 | Stop reason: SDUPTHER

## 2024-01-03 RX ORDER — SODIUM CHLORIDE, SODIUM LACTATE, POTASSIUM CHLORIDE, CALCIUM CHLORIDE 600; 310; 30; 20 MG/100ML; MG/100ML; MG/100ML; MG/100ML
INJECTION, SOLUTION INTRAVENOUS CONTINUOUS PRN
Status: DISCONTINUED | OUTPATIENT
Start: 2024-01-03 | End: 2024-01-03 | Stop reason: SDUPTHER

## 2024-01-03 RX ORDER — LIDOCAINE HYDROCHLORIDE 10 MG/ML
INJECTION, SOLUTION EPIDURAL; INFILTRATION; INTRACAUDAL; PERINEURAL PRN
Status: DISCONTINUED | OUTPATIENT
Start: 2024-01-03 | End: 2024-01-03 | Stop reason: SDUPTHER

## 2024-01-03 RX ORDER — ONDANSETRON 2 MG/ML
4 INJECTION INTRAMUSCULAR; INTRAVENOUS
Status: DISCONTINUED | OUTPATIENT
Start: 2024-01-03 | End: 2024-01-03 | Stop reason: HOSPADM

## 2024-01-03 RX ADMIN — SODIUM CHLORIDE, POTASSIUM CHLORIDE, SODIUM LACTATE AND CALCIUM CHLORIDE: 600; 310; 30; 20 INJECTION, SOLUTION INTRAVENOUS at 11:21

## 2024-01-03 RX ADMIN — LIDOCAINE HYDROCHLORIDE 50 MG: 10 INJECTION, SOLUTION EPIDURAL; INFILTRATION; INTRACAUDAL; PERINEURAL at 11:44

## 2024-01-03 RX ADMIN — PROPOFOL 75 MCG/KG/MIN: 10 INJECTION, EMULSION INTRAVENOUS at 11:47

## 2024-01-03 RX ADMIN — Medication 50 MCG: at 11:54

## 2024-01-03 RX ADMIN — Medication 100 MCG: at 11:48

## 2024-01-03 RX ADMIN — PROPOFOL 70 MG: 10 INJECTION, EMULSION INTRAVENOUS at 11:45

## 2024-01-03 ASSESSMENT — LIFESTYLE VARIABLES: SMOKING_STATUS: 0

## 2024-01-03 ASSESSMENT — PAIN - FUNCTIONAL ASSESSMENT: PAIN_FUNCTIONAL_ASSESSMENT: 0-10

## 2024-01-03 NOTE — ANESTHESIA PRE PROCEDURE
• PONV (postoperative nausea and vomiting)    • Sleep apnea     cpap   • Unspecified acute reaction to stress    • Unspecified essential hypertension        Past Surgical History:        Procedure Laterality Date   • COLONOSCOPY     • COLONOSCOPY  10/26/2021    Dr. Priest - polypectomy   • COLONOSCOPY N/A 10/26/2021    COLONOSCOPY POLYPECTOMY REMOVAL HOT SNARE performed by Mario Priest MD at [facility] OR   • COLONOSCOPY N/A 2023    colonoscopy performed by Radha Ortega DO at [facility] OR   • HYSTERECTOMY (CERVIX STATUS UNKNOWN)     • TUBAL LIGATION         Social History:    Social History     Tobacco Use   • Smoking status: Former     Current packs/day: 0.00     Average packs/day: 1 pack/day for 6.0 years (6.0 ttl pk-yrs)     Types: Cigarettes     Start date:      Quit date:      Years since quittin.0   • Smokeless tobacco: Never   Substance Use Topics   • Alcohol use: Yes     Alcohol/week: 4.0 standard drinks of alcohol     Types: 4 Cans of beer per week     Comment: 2 times weekly                                Counseling given: Not Answered      Vital Signs (Current):   Vitals:    24 0952   Weight: 65.8 kg (145 lb)   Height: 1.549 m (5' 1\")                                              BP Readings from Last 3 Encounters:   23 (!) 162/72   11/10/23 (!) 157/87   08/15/23 129/60       NPO Status:                                                                                 BMI:   Wt Readings from Last 3 Encounters:   24 65.8 kg (145 lb)   23 63.5 kg (140 lb)   11/10/23 65.8 kg (145 lb)     Body mass index is 27.4 kg/m².    CBC:   Lab Results   Component Value Date/Time    WBC 4.8 10/30/2023 08:50 AM    WBC 5.7 10/22/2021 09:13 AM    RBC 4.57 10/30/2023 08:50 AM    HGB 13.9 10/30/2023 08:50 AM    HCT 40.7 10/30/2023 08:50 AM    MCV 89.1 10/30/2023 08:50 AM    RDW 13.1 10/30/2023 08:50 AM     10/30/2023 08:50 AM     10/22/2021 09:13 AM       CMP:   Lab

## 2024-01-03 NOTE — OP NOTE
Operative Note      Patient: Fany Olmedo  YOB: 1955  MRN: [id number]    Date of Procedure: 1/3/2024    Pre-Op Diagnosis Codes:     * Polyp of cecum [K63.5]    Post-Op Diagnosis: Cecal mass s/p biopsy, descending colon polyp, internal hemorrhoids.       Procedure(s):  COLONOSCOPY, EMR    Surgeon(s):  Amador Adam MD    Assistant:   * No surgical staff found *    Anesthesia: Monitor Anesthesia Care    Estimated Blood Loss (mL): Minimal    Complications: None    Specimens:   ID Type Source Tests Collected by Time Destination   A : cecal mass bx Tissue Cecum SURGICAL PATHOLOGY Amador Adam MD 1/3/2024 1201    B : descending colon polyp Tissue Colon-Descending SURGICAL PATHOLOGY Amador Adam MD 1/3/2024 1205        Implants:  * No implants in log *      Drains: * No LDAs found *      Scope withdrawal time: 19 minutes    Description of Procedure:  Informed consent was obtained from the patient after explanation of the procedure including indications, description of the procedure,  benefits and possible risks and complications of the procedure, and alternatives. Questions were answered.  The patient's history was reviewed and a directed physical examination was performed prior to the procedure.    Patient was monitored throughout the procedure with pulse oximetry and periodic assessment of vital signs. Patient was sedated as noted above. With the patient initially in the left lateral decubitus position, a digital rectal examination was performed and revealed negative without mass, lesions or tenderness.  The Olympus video colonoscope was placed in the patient's rectum and advanced without difficulty  to the cecum, which was identified by the ileocecal valve and appendiceal orifice.  The prep was good.  Examination of the mucosa was performed during both introduction and withdrawal of the colonoscope. Retroflexed view of the rectum was performed.  The patient  was taken to the

## 2024-01-03 NOTE — DISCHARGE INSTRUCTIONS
[facility]    POST-ENDOSCOPY INSTRUCTIONS    1. ACTIVITY   No driving, operating machinery, or making important decisions for 24 hours.    Resume normal activity after 24 hours.  You may return to work after 24 hours.    2. DIET        Resume your usual diet unless specified below.   ***    3. MEDICATIONS    Resume your usual medications.     Do not consume alcohol, tranquilizers, or sleeping medications for 24 hour unless advised by your physician.                 4. PHYSICIAN FOLLOW-UP / INSTRUCTIONS    Please call the office/clinic in 10 days for biopsy results:      [  ] GI office:  [phone]          Follow up with your family physician as planned.    6. NORMAL CHANGES YOU MAY EXPERIENCE AFTER ENDOSCOPY:       COLONOSCOPY   Passing of gas for several hours.       Some mild abdominal cramping.                You may feel fatigued for the next 24-48   hours due to the prep and sedation    7. CALL YOUR PHYSICIAN IF YOU EXPERIENCE ANY OF THE FOLLOWING      A.  Passing blood rectally or vomiting blood (color may be red or black)      B.  Severe abdominal pain or tenderness (that is not relieved by passing air)      C.  Fever, chills, or excessive sweating      D.  Persistent nausea or vomiting      E.  Redness or swelling at the IV site    If you have additional questions, PLEASE call your doctor or the [facility] ([phone])  No alcoholic beverages, no driving or operating machinery, no making important decisions for 24 hours.   You may have a normal diet but should eat lightly day of surgery.  Drink plenty of fluids.  Urinate within 8 hours after surgery, if unable to urinate call your doctor

## 2024-01-03 NOTE — H&P
History and Physical    Pt Name: Fany Olmedo  MRN: [id number]  YOB: 1955  Date of evaluation: 1/3/2024    SUBJECTIVE:   History of Chief Complaint:    Patient presents preprocedure for colonoscopy, EMR.  Patient says that she has had colonoscopies in the past, two years ago had a polyp removed.  She says that several months ago on colonoscopy the polyp had returned and was larger in size.  She has been scheduled now for procedure today.  Past Medical History    has a past medical history of Arthritis, Depression, History of colon polyps, Hyperlipidemia, Hypertension, FABIEN on CPAP, Other and unspecified hyperlipidemia, Polyp of cecum, PONV (postoperative nausea and vomiting), Unspecified acute reaction to stress, and Unspecified essential hypertension.  Past Surgical History   has a past surgical history that includes Hysterectomy; Tubal ligation; Colonoscopy; Colonoscopy (10/26/2021); Colonoscopy (N/A, 10/26/2021); and Colonoscopy (N/A, 11/17/2023).  Medications  Prior to Admission medications    Medication Sig Start Date End Date Taking? Authorizing Provider   acetaminophen (TYLENOL) 325 MG tablet Take 2 tablets by mouth every 6 hours as needed for Pain   Yes Provider, MD Melonie   atenolol (TENORMIN) 25 MG tablet TAKE 1 TABLET EVERY MORNING 1/3/24   Samy Irvin APRN - CNP   omeprazole (PRILOSEC) 20 MG delayed release capsule TAKE 1 CAPSULE DAILY 1/3/24   Samy Irvin APRN - CNP   hydroCHLOROthiazide (HYDRODIURIL) 50 MG tablet TAKE 1 TABLET DAILY 1/3/24   Samy Irvin APRN - CNP   valsartan (DIOVAN) 80 MG tablet Take 1 tablet by mouth daily 12/18/23   Samy Irvin APRN - CNP   rosuvastatin (CRESTOR) 20 MG tablet Take 1 tablet by mouth nightly 11/10/23   Samy Irvin APRN - CNP   citalopram (CELEXA) 40 MG tablet Take 1 tablet by mouth daily 11/10/23   Samy Irvin APRN - CNP     Allergies  is allergic to keflex [cephalexin] and sulfa  auscultation bilaterally, no wheezes.  CARDIOVASCULAR: Heart sounds are normal.  Regular rate and rhythm without murmur.  ABDOMEN: non distended.  EXTREMITIES: no edema bilateral lower extremities.    IMPRESSIONS:   Polyp of cecum   has a past medical history of Arthritis (01/02/2024), Depression, History of colon polyps, Hyperlipidemia, Hypertension, FABIEN on CPAP, Other and unspecified hyperlipidemia, Polyp of cecum, PONV (postoperative nausea and vomiting), Unspecified acute reaction to stress, and Unspecified essential hypertension.   PLANS:   Colonoscopy, EMR    GIOVANNY CONNOR PA-C  Electronically signed 1/3/2024 at 10:42 AM

## 2024-01-03 NOTE — ANESTHESIA POSTPROCEDURE EVALUATION
Department of Anesthesiology  Postprocedure Note    Patient: Fany Olmedo  MRN: [id number]  YOB: 1955  Date of evaluation: 1/3/2024    Procedure Summary       Date: 01/03/24 Room / Location: [address]    Anesthesia Start: 1132 Anesthesia Stop: 1220    Procedure: COLONOSCOPY, EMR Diagnosis:       Polyp of cecum      (Polyp of cecum [K63.5])    Surgeons: Amador Adam MD Responsible Provider: Mina Waters MD    Anesthesia Type: MAC ASA Status: 3            Anesthesia Type: No value filed.    Jennifer Phase I: Jennifer Score: 10    Jennifer Phase II: Jennifer Score: 10  POST-OP ANESTHESIA NOTE       BP (!) 144/81   Pulse 56   Temp 97 °F (36.1 °C) (Temporal)   Resp 16   Ht 1.549 m (5' 1\")   Wt 65.8 kg (145 lb)   LMP  (LMP Unknown)   SpO2 99%   BMI 27.40 kg/m²    Pain Assessment: None - Denies Pain  Pain Level: 0           Anesthesia Post Evaluation    Patient location during evaluation: PACU  Patient participation: complete - patient participated  Level of consciousness: awake  Pain score: 0  Airway patency: patent  Nausea & Vomiting: no vomiting and no nausea  Cardiovascular status: hemodynamically stable  Respiratory status: acceptable  Hydration status: stable  Pain management: adequate        No notable events documented.

## 2024-01-04 ENCOUNTER — TELEPHONE (OUTPATIENT)
Dept: SURGERY | Age: 69
End: 2024-01-04

## 2024-01-04 DIAGNOSIS — K63.89 CECUM MASS: Primary | ICD-10-CM

## 2024-01-04 LAB — SURGICAL PATHOLOGY REPORT: NORMAL

## 2024-01-05 NOTE — TELEPHONE ENCOUNTER
Sent to [facility]. Patient aware.  
examination was performed prior to the procedure.     Patient was monitored throughout the procedure with pulse oximetry and periodic assessment of vital signs. Patient was sedated as noted above. With the patient initially in the left lateral decubitus position, a digital rectal examination was performed and revealed negative without mass, lesions or tenderness.  The Olympus video colonoscope was placed in the patient's rectum and advanced without difficulty  to the cecum, which was identified by the ileocecal valve and appendiceal orifice.  The prep was good.  Examination of the mucosa was performed during both introduction and withdrawal of the colonoscope. Retroflexed view of the rectum was performed.  The patient  was taken to the recovery area in good condition.     Findings:      1.  A medium size, semisessile,erythematous mass was seen next to the appendiceal orifice.  EMR was instituted.  Attempt was made to elevate the mass using Elaview. Mass was not able to be elevated using Elaview. Due to non lifting sign the EMR was not attempted. Biopsies of the mass were done.   2.  6 mm sessile polyp was seen in the descending colon.  Cold snare polypectomy was done.  Resection retrieval was complete.  3.  Internal hemorrhoids were seen during regular endoscopy     Recommendations: Follow-up with the biopsy results.                                     Due to not lifting sign of the cecal mass recommend surgical removal.                                     Follow-up with Dr. Ortega.     Amador Adam MD     Electronically signed by Amador Adam MD on 1/3/2024 at 12:11 PM

## 2024-01-15 ENCOUNTER — TELEPHONE (OUTPATIENT)
Dept: GASTROENTEROLOGY | Age: 69
End: 2024-01-15

## 2024-01-15 NOTE — TELEPHONE ENCOUNTER
Patient called about results of biopsy. Writer referred patient to PCP or General surgery whom she was referred to. Patient acknowledge understanding.

## 2024-01-18 ENCOUNTER — OFFICE VISIT (OUTPATIENT)
Dept: PULMONOLOGY | Age: 69
End: 2024-01-18
Payer: MEDICARE

## 2024-01-18 VITALS
TEMPERATURE: 97 F | HEART RATE: 62 BPM | SYSTOLIC BLOOD PRESSURE: 141 MMHG | OXYGEN SATURATION: 98 % | WEIGHT: 148.4 LBS | HEIGHT: 61 IN | DIASTOLIC BLOOD PRESSURE: 82 MMHG | RESPIRATION RATE: 16 BRPM | BODY MASS INDEX: 28.02 KG/M2

## 2024-01-18 DIAGNOSIS — I10 ESSENTIAL HYPERTENSION: ICD-10-CM

## 2024-01-18 DIAGNOSIS — G47.33 OSA ON CPAP: Primary | ICD-10-CM

## 2024-01-18 PROCEDURE — 3017F COLORECTAL CA SCREEN DOC REV: CPT | Performed by: INTERNAL MEDICINE

## 2024-01-18 PROCEDURE — 3079F DIAST BP 80-89 MM HG: CPT | Performed by: INTERNAL MEDICINE

## 2024-01-18 PROCEDURE — 3077F SYST BP >= 140 MM HG: CPT | Performed by: INTERNAL MEDICINE

## 2024-01-18 PROCEDURE — G8484 FLU IMMUNIZE NO ADMIN: HCPCS | Performed by: INTERNAL MEDICINE

## 2024-01-18 PROCEDURE — 99214 OFFICE O/P EST MOD 30 MIN: CPT | Performed by: INTERNAL MEDICINE

## 2024-01-18 PROCEDURE — 1090F PRES/ABSN URINE INCON ASSESS: CPT | Performed by: INTERNAL MEDICINE

## 2024-01-18 PROCEDURE — G8427 DOCREV CUR MEDS BY ELIG CLIN: HCPCS | Performed by: INTERNAL MEDICINE

## 2024-01-18 PROCEDURE — G8419 CALC BMI OUT NRM PARAM NOF/U: HCPCS | Performed by: INTERNAL MEDICINE

## 2024-01-18 PROCEDURE — 99213 OFFICE O/P EST LOW 20 MIN: CPT

## 2024-01-18 PROCEDURE — G8399 PT W/DXA RESULTS DOCUMENT: HCPCS | Performed by: INTERNAL MEDICINE

## 2024-01-18 PROCEDURE — 1123F ACP DISCUSS/DSCN MKR DOCD: CPT | Performed by: INTERNAL MEDICINE

## 2024-01-18 PROCEDURE — 1036F TOBACCO NON-USER: CPT | Performed by: INTERNAL MEDICINE

## 2024-01-18 NOTE — PROGRESS NOTES
OUTPATIENT PULMONARY PROGRESS NOTE      Patient:  Fany Olmedo  MRN: [id number]    Consulting Physician: Dr Irvin  Reason for Consult: FABIEN  Primacy Care Physician: Samy Irvin APRN - CNP    HISTORY OF PRESENT ILLNESS:   The patient is a 68 y.o. female for follow-up of sleep apnea    She is here for follow-up of sleep apnea.  She was seen last time 1 year ago.  Since he was seen last time she is back to oronasal mask she had tried different mask and oral nasal mask support best for her.  She is very compliant with CPAP she use CPAP almost every night except when she has sinus or congestion that she does not use it which does not happen that often.  When she wake up in the morning she usually feels fresh she does not take nap during the daytime and she does not doze off during the daytime.  She does not get sleepy when she drive.  Her problems he is always is getting better seen but oronasal mask worked best for her.  Compliance data is not available at this time currently but will get the compliance data her CPAP pressure was 8 when she was seen last time which was reduced from 90 cm and she is tolerating it much better and she think that there is much better    Last CPAP compliance data is available from October 2 to October 31, 2022 average usage was 6 hours 2 minutes and compliance is 76% average AHI is 1.82 events per hour.  This compliance data is from October 2 to October 31, 2022 for 30 days.    Sleep questionnaire on CPAP on 01/18/2023  No snoring at night, does not wakes herself snoring. Has no witnessed apnea.  Does not wake up with gasping or choking sensation.  Dry mouth upon awakening. Negative fatigue and tiredness during the day. Goes to sleep at 10 pm, wakes up 6- 7 am. It takes 10 minutes to fall asleep. Wake up once at night to go to bathroom. Takes no nap during the day. No headache in am. No car wrecks or near wrecks because of the sleepiness. No nodding off while driving. No

## 2024-01-18 NOTE — PATIENT INSTRUCTIONS
SURVEY:    Thank you for allowing us to care for you today.    You may be receiving a survey from [facility] regarding your visit today- electronically or via mail.      Please help us by completing the survey as this will provide the needed feedback to ensure we are providing the very best care for you and your family.    If you cannot score us a very good on any question, please call the office to discuss how we could have made your experience a very good one.    Thank you.      :    Meli Roth      CLINICAL STAFF:    Tiffany Mcqueen LPN, Sharifa Mireles LPN, Brenda Fernandes LPN, Juliana Plummer CMA , Connie CULLEN CMA

## 2024-01-18 NOTE — ADDENDUM NOTE
Addended by: RICK GALLARDO on: 1/18/2024 11:54 AM     Modules accepted: Orders, Level of Service

## 2024-04-29 ENCOUNTER — HOSPITAL ENCOUNTER (OUTPATIENT)
Age: 69
Setting detail: SPECIMEN
Discharge: HOME | End: 2024-04-29

## 2024-04-29 DIAGNOSIS — R51.9 TEMPORAL PAIN: ICD-10-CM

## 2024-04-29 LAB
CRP SERPL HS-MCNC: 4 MG/L (ref 0–5)
ERYTHROCYTE [SEDIMENTATION RATE] IN BLOOD BY PHOTOMETRIC METHOD: 2 MM/HR (ref 0–30)

## 2024-04-29 NOTE — RESULT ENCOUNTER NOTE
Please call pt and inform them their labwork results are normal.  Continue prednisone as directed. Need f/u in 1 week and repeat esr and crp in 10 days for reeval

## 2024-07-01 ENCOUNTER — HOSPITAL ENCOUNTER (OUTPATIENT)
Age: 69
Setting detail: SPECIMEN
Discharge: HOME | End: 2024-07-01

## 2024-07-01 DIAGNOSIS — R30.0 DYSURIA: ICD-10-CM

## 2024-07-01 LAB
BACTERIA URNS QL MICRO: ABNORMAL
BILIRUB UR QL STRIP: NEGATIVE
CASTS #/AREA URNS LPF: ABNORMAL /LPF (ref 0–2)
CASTS #/AREA URNS LPF: ABNORMAL /LPF (ref 0–2)
CLARITY UR: ABNORMAL
COLOR UR: YELLOW
EPI CELLS #/AREA URNS HPF: ABNORMAL /HPF (ref 0–5)
GLUCOSE UR STRIP-MCNC: NEGATIVE MG/DL
HGB UR QL STRIP.AUTO: ABNORMAL
KETONES UR STRIP-MCNC: NEGATIVE MG/DL
LEUKOCYTE ESTERASE UR QL STRIP: ABNORMAL
NITRITE UR QL STRIP: NEGATIVE
PH UR STRIP: 6 [PH] (ref 5–8)
PROT UR STRIP-MCNC: NEGATIVE MG/DL
RBC #/AREA URNS HPF: ABNORMAL /HPF (ref 0–2)
SP GR UR STRIP: 1.01 (ref 1–1.03)
UROBILINOGEN UR STRIP-ACNC: NORMAL EU/DL (ref 0–1)
WBC #/AREA URNS HPF: ABNORMAL /HPF (ref 0–5)
YEAST URNS QL MICRO: ABNORMAL

## 2024-07-03 LAB
MICROORGANISM SPEC CULT: ABNORMAL
SPECIMEN DESCRIPTION: ABNORMAL

## 2024-07-29 ENCOUNTER — HOSPITAL ENCOUNTER (OUTPATIENT)
Age: 69
Setting detail: SPECIMEN
Discharge: HOME | End: 2024-07-29

## 2024-07-29 DIAGNOSIS — R30.0 DYSURIA: ICD-10-CM

## 2024-07-30 LAB
BACTERIA URNS QL MICRO: ABNORMAL
BILIRUB UR QL STRIP: NEGATIVE
CASTS #/AREA URNS LPF: ABNORMAL /LPF (ref 0–8)
CLARITY UR: ABNORMAL
COLOR UR: YELLOW
EPI CELLS #/AREA URNS HPF: ABNORMAL /HPF (ref 0–5)
GLUCOSE UR STRIP-MCNC: NEGATIVE MG/DL
HGB UR QL STRIP.AUTO: NEGATIVE
KETONES UR STRIP-MCNC: NEGATIVE MG/DL
LEUKOCYTE ESTERASE UR QL STRIP: ABNORMAL
NITRITE UR QL STRIP: NEGATIVE
PH UR STRIP: 5.5 [PH] (ref 5–8)
PROT UR STRIP-MCNC: NEGATIVE MG/DL
RBC #/AREA URNS HPF: ABNORMAL /HPF (ref 0–4)
SP GR UR STRIP: 1.01 (ref 1–1.03)
UROBILINOGEN UR STRIP-ACNC: NORMAL EU/DL (ref 0–1)
WBC #/AREA URNS HPF: ABNORMAL /HPF (ref 0–5)

## 2024-07-30 NOTE — RESULT ENCOUNTER NOTE
Please call pt and inform them their labwork results show ecoli UTI; start levaquin 500mg po daily x10 days.  Recommend recheck u/a C&S in 14 days.

## 2024-07-31 LAB
MICROORGANISM SPEC CULT: ABNORMAL
SPECIMEN DESCRIPTION: ABNORMAL

## 2024-08-02 NOTE — RESULT ENCOUNTER NOTE
Please call pt and inform them their labwork results show ecoli uti  Sensitive to levaquin; continue antibiotic till gone.

## 2024-08-07 ENCOUNTER — HOSPITAL ENCOUNTER (OUTPATIENT)
Dept: WOMENS IMAGING | Age: 69
Discharge: HOME | End: 2024-08-09
Payer: MEDICARE

## 2024-08-07 VITALS — BODY MASS INDEX: 30.21 KG/M2 | HEIGHT: 61 IN | WEIGHT: 160 LBS

## 2024-08-07 DIAGNOSIS — Z12.39 SCREENING BREAST EXAMINATION: ICD-10-CM

## 2024-08-07 PROCEDURE — 77063 BREAST TOMOSYNTHESIS BI: CPT

## 2024-08-13 ENCOUNTER — HOSPITAL ENCOUNTER (OUTPATIENT)
Age: 69
Setting detail: SPECIMEN
Discharge: HOME | End: 2024-08-13

## 2024-08-13 DIAGNOSIS — R30.0 DYSURIA: ICD-10-CM

## 2024-08-13 DIAGNOSIS — R53.83 OTHER FATIGUE: ICD-10-CM

## 2024-08-13 DIAGNOSIS — I10 ESSENTIAL HYPERTENSION: ICD-10-CM

## 2024-08-13 DIAGNOSIS — E78.5 HYPERLIPIDEMIA, UNSPECIFIED HYPERLIPIDEMIA TYPE: ICD-10-CM

## 2024-08-13 DIAGNOSIS — R79.9 ABNORMAL FINDING OF BLOOD CHEMISTRY, UNSPECIFIED: ICD-10-CM

## 2024-08-13 LAB
ALBUMIN SERPL-MCNC: 4.4 G/DL (ref 3.5–5.2)
ALBUMIN/GLOB SERPL: 2 {RATIO} (ref 1–2.5)
ALP SERPL-CCNC: 59 U/L (ref 35–104)
ALT SERPL-CCNC: 24 U/L (ref 10–35)
ANION GAP SERPL CALCULATED.3IONS-SCNC: 12 MMOL/L (ref 9–16)
AST SERPL-CCNC: 28 U/L (ref 10–35)
BILIRUB SERPL-MCNC: 0.5 MG/DL (ref 0–1.2)
BUN SERPL-MCNC: 14 MG/DL (ref 8–23)
CALCIUM SERPL-MCNC: 9.1 MG/DL (ref 8.6–10.4)
CHLORIDE SERPL-SCNC: 99 MMOL/L (ref 98–107)
CHOLEST SERPL-MCNC: 154 MG/DL (ref 0–199)
CHOLESTEROL/HDL RATIO: 4
CO2 SERPL-SCNC: 28 MMOL/L (ref 20–31)
CREAT SERPL-MCNC: 0.9 MG/DL (ref 0.5–0.9)
ERYTHROCYTE [DISTWIDTH] IN BLOOD BY AUTOMATED COUNT: 12.8 % (ref 11.8–14.4)
EST. AVERAGE GLUCOSE BLD GHB EST-MCNC: 111 MG/DL
GFR, ESTIMATED: 75 ML/MIN/1.73M2
GLUCOSE SERPL-MCNC: 94 MG/DL (ref 74–99)
HBA1C MFR BLD: 5.5 % (ref 4–6)
HCT VFR BLD AUTO: 36.8 % (ref 36.3–47.1)
HDLC SERPL-MCNC: 40 MG/DL
HGB BLD-MCNC: 12.3 G/DL (ref 11.9–15.1)
LDLC SERPL CALC-MCNC: 68 MG/DL (ref 0–100)
MCH RBC QN AUTO: 30 PG (ref 25.2–33.5)
MCHC RBC AUTO-ENTMCNC: 33.4 G/DL (ref 28.4–34.8)
MCV RBC AUTO: 89.8 FL (ref 82.6–102.9)
NRBC BLD-RTO: 0 PER 100 WBC
PLATELET # BLD AUTO: 193 K/UL (ref 138–453)
PMV BLD AUTO: 11.5 FL (ref 8.1–13.5)
POTASSIUM SERPL-SCNC: 3.8 MMOL/L (ref 3.7–5.3)
PROT SERPL-MCNC: 6.7 G/DL (ref 6.6–8.7)
RBC # BLD AUTO: 4.1 M/UL (ref 3.95–5.11)
SODIUM SERPL-SCNC: 139 MMOL/L (ref 136–145)
TRIGL SERPL-MCNC: 230 MG/DL
TSH SERPL DL<=0.05 MIU/L-ACNC: 4.29 UIU/ML (ref 0.27–4.2)
VLDLC SERPL CALC-MCNC: 46 MG/DL
WBC OTHER # BLD: 4.9 K/UL (ref 3.5–11.3)

## 2024-08-14 LAB
BACTERIA URNS QL MICRO: ABNORMAL
BILIRUB UR QL STRIP: NEGATIVE
CASTS #/AREA URNS LPF: ABNORMAL /LPF (ref 0–8)
CLARITY UR: CLEAR
COLOR UR: YELLOW
EPI CELLS #/AREA URNS HPF: ABNORMAL /HPF (ref 0–5)
GLUCOSE UR STRIP-MCNC: NEGATIVE MG/DL
HGB UR QL STRIP.AUTO: NEGATIVE
KETONES UR STRIP-MCNC: NEGATIVE MG/DL
LEUKOCYTE ESTERASE UR QL STRIP: NEGATIVE
NITRITE UR QL STRIP: NEGATIVE
PH UR STRIP: 7.5 [PH] (ref 5–8)
PROT UR STRIP-MCNC: NEGATIVE MG/DL
RBC #/AREA URNS HPF: ABNORMAL /HPF (ref 0–4)
SP GR UR STRIP: 1.02 (ref 1–1.03)
UROBILINOGEN UR STRIP-ACNC: NORMAL EU/DL (ref 0–1)
WBC #/AREA URNS HPF: ABNORMAL /HPF (ref 0–5)

## 2024-08-15 LAB
MICROORGANISM SPEC CULT: ABNORMAL
MICROORGANISM SPEC CULT: ABNORMAL
SPECIMEN DESCRIPTION: ABNORMAL

## 2024-08-16 NOTE — RESULT ENCOUNTER NOTE
Please call pt and inform them their labwork results show ecoli infection; resistant to most drugs other than macrobid.  It is resistant to levaquin also.  Recommend a referral to urology and also infectious disease for eval.  Also need to take macrobid 100mg po bid x10 days for tx.

## 2024-08-27 ENCOUNTER — HOSPITAL ENCOUNTER (INPATIENT)
Age: 69
LOS: 3 days | Discharge: HOME | DRG: 556 | End: 2024-08-30
Attending: INTERNAL MEDICINE | Admitting: INTERNAL MEDICINE
Payer: MEDICARE

## 2024-08-27 ENCOUNTER — APPOINTMENT (OUTPATIENT)
Dept: CT IMAGING | Age: 69
DRG: 556 | End: 2024-08-27
Attending: INTERNAL MEDICINE
Payer: MEDICARE

## 2024-08-27 ENCOUNTER — APPOINTMENT (OUTPATIENT)
Dept: GENERAL RADIOLOGY | Age: 69
DRG: 556 | End: 2024-08-27
Attending: INTERNAL MEDICINE
Payer: MEDICARE

## 2024-08-27 PROBLEM — M25.50 POLYARTHRALGIA: Status: ACTIVE | Noted: 2024-08-27

## 2024-08-27 PROBLEM — N39.0 COMPLICATED UTI (URINARY TRACT INFECTION): Status: ACTIVE | Noted: 2024-08-27

## 2024-08-27 LAB
ALBUMIN SERPL-MCNC: 4 G/DL (ref 3.5–5.2)
ALBUMIN/GLOB SERPL: 1.7 {RATIO} (ref 1–2.5)
ALP SERPL-CCNC: 54 U/L (ref 35–104)
ALT SERPL-CCNC: 22 U/L (ref 10–35)
ANION GAP SERPL CALCULATED.3IONS-SCNC: 13 MMOL/L (ref 9–16)
AST SERPL-CCNC: 19 U/L (ref 10–35)
BACTERIA URNS QL MICRO: ABNORMAL
BILIRUB SERPL-MCNC: 0.7 MG/DL (ref 0–1.2)
BILIRUB UR QL STRIP: NEGATIVE
BUN SERPL-MCNC: 13 MG/DL (ref 8–23)
BUN/CREAT SERPL: 19 (ref 9–20)
CALCIUM SERPL-MCNC: 9 MG/DL (ref 8.6–10.4)
CHLORIDE SERPL-SCNC: 96 MMOL/L (ref 98–107)
CLARITY UR: CLEAR
CO2 SERPL-SCNC: 27 MMOL/L (ref 20–31)
COLOR UR: YELLOW
CREAT SERPL-MCNC: 0.7 MG/DL (ref 0.5–0.9)
CRP SERPL HS-MCNC: 125 MG/L (ref 0–5)
EPI CELLS #/AREA URNS HPF: ABNORMAL /HPF (ref 0–25)
GFR, ESTIMATED: 88 ML/MIN/1.73M2
GLUCOSE SERPL-MCNC: 109 MG/DL (ref 74–99)
GLUCOSE UR STRIP-MCNC: NEGATIVE MG/DL
HGB UR QL STRIP.AUTO: ABNORMAL
KETONES UR STRIP-MCNC: NEGATIVE MG/DL
LACTATE BLDV-SCNC: 1.2 MMOL/L (ref 0.5–1.9)
LACTATE BLDV-SCNC: 1.6 MMOL/L (ref 0.5–1.9)
LEUKOCYTE ESTERASE UR QL STRIP: NEGATIVE
MAGNESIUM SERPL-MCNC: 1.6 MG/DL (ref 1.6–2.4)
NITRITE UR QL STRIP: NEGATIVE
PH UR STRIP: 7 [PH] (ref 5–9)
POTASSIUM SERPL-SCNC: 2.8 MMOL/L (ref 3.7–5.3)
PROT SERPL-MCNC: 6.3 G/DL (ref 6.6–8.7)
PROT UR STRIP-MCNC: ABNORMAL MG/DL
RBC #/AREA URNS HPF: ABNORMAL /HPF (ref 0–2)
SEND OUT REPORT: NORMAL
SODIUM SERPL-SCNC: 136 MMOL/L (ref 136–145)
SP GR UR STRIP: 1.01 (ref 1.01–1.02)
UROBILINOGEN UR STRIP-ACNC: NORMAL EU/DL (ref 0–1)
WBC #/AREA URNS HPF: ABNORMAL /HPF (ref 0–5)

## 2024-08-27 PROCEDURE — 71046 X-RAY EXAM CHEST 2 VIEWS: CPT

## 2024-08-27 PROCEDURE — 86431 RHEUMATOID FACTOR QUANT: CPT

## 2024-08-27 PROCEDURE — 86140 C-REACTIVE PROTEIN: CPT

## 2024-08-27 PROCEDURE — 83735 ASSAY OF MAGNESIUM: CPT

## 2024-08-27 PROCEDURE — 74176 CT ABD & PELVIS W/O CONTRAST: CPT

## 2024-08-27 PROCEDURE — 6360000002 HC RX W HCPCS: Performed by: INTERNAL MEDICINE

## 2024-08-27 PROCEDURE — 81001 URINALYSIS AUTO W/SCOPE: CPT

## 2024-08-27 PROCEDURE — 6360000002 HC RX W HCPCS: Performed by: NURSE PRACTITIONER

## 2024-08-27 PROCEDURE — 86160 COMPLEMENT ANTIGEN: CPT

## 2024-08-27 PROCEDURE — 86038 ANTINUCLEAR ANTIBODIES: CPT

## 2024-08-27 PROCEDURE — 83605 ASSAY OF LACTIC ACID: CPT

## 2024-08-27 PROCEDURE — 84550 ASSAY OF BLOOD/URIC ACID: CPT

## 2024-08-27 PROCEDURE — 80053 COMPREHEN METABOLIC PANEL: CPT

## 2024-08-27 PROCEDURE — 93005 ELECTROCARDIOGRAM TRACING: CPT | Performed by: NURSE PRACTITIONER

## 2024-08-27 PROCEDURE — 85652 RBC SED RATE AUTOMATED: CPT

## 2024-08-27 PROCEDURE — 36415 COLL VENOUS BLD VENIPUNCTURE: CPT

## 2024-08-27 PROCEDURE — 2580000003 HC RX 258: Performed by: NURSE PRACTITIONER

## 2024-08-27 PROCEDURE — 1200000000 HC SEMI PRIVATE

## 2024-08-27 PROCEDURE — 6370000000 HC RX 637 (ALT 250 FOR IP): Performed by: NURSE PRACTITIONER

## 2024-08-27 PROCEDURE — 87086 URINE CULTURE/COLONY COUNT: CPT

## 2024-08-27 PROCEDURE — 87040 BLOOD CULTURE FOR BACTERIA: CPT

## 2024-08-27 RX ORDER — CITALOPRAM HYDROBROMIDE 20 MG/1
40 TABLET ORAL DAILY
Status: DISCONTINUED | OUTPATIENT
Start: 2024-08-27 | End: 2024-08-30 | Stop reason: HOSPADM

## 2024-08-27 RX ORDER — ONDANSETRON 4 MG/1
4 TABLET, ORALLY DISINTEGRATING ORAL EVERY 8 HOURS PRN
Status: DISCONTINUED | OUTPATIENT
Start: 2024-08-27 | End: 2024-08-30 | Stop reason: HOSPADM

## 2024-08-27 RX ORDER — POTASSIUM CHLORIDE 1500 MG/1
40 TABLET, EXTENDED RELEASE ORAL PRN
Status: DISCONTINUED | OUTPATIENT
Start: 2024-08-27 | End: 2024-08-30 | Stop reason: HOSPADM

## 2024-08-27 RX ORDER — ONDANSETRON 2 MG/ML
4 INJECTION INTRAMUSCULAR; INTRAVENOUS EVERY 6 HOURS PRN
Status: DISCONTINUED | OUTPATIENT
Start: 2024-08-27 | End: 2024-08-30 | Stop reason: HOSPADM

## 2024-08-27 RX ORDER — SODIUM CHLORIDE 0.9 % (FLUSH) 0.9 %
5-40 SYRINGE (ML) INJECTION PRN
Status: DISCONTINUED | OUTPATIENT
Start: 2024-08-27 | End: 2024-08-30 | Stop reason: HOSPADM

## 2024-08-27 RX ORDER — ATENOLOL 25 MG/1
25 TABLET ORAL EVERY MORNING
Status: DISCONTINUED | OUTPATIENT
Start: 2024-08-28 | End: 2024-08-30 | Stop reason: HOSPADM

## 2024-08-27 RX ORDER — ACETAMINOPHEN 650 MG/1
650 SUPPOSITORY RECTAL EVERY 6 HOURS PRN
Status: DISCONTINUED | OUTPATIENT
Start: 2024-08-27 | End: 2024-08-30 | Stop reason: HOSPADM

## 2024-08-27 RX ORDER — KETOROLAC TROMETHAMINE 15 MG/ML
15 INJECTION, SOLUTION INTRAMUSCULAR; INTRAVENOUS EVERY 6 HOURS PRN
Status: DISCONTINUED | OUTPATIENT
Start: 2024-08-27 | End: 2024-08-30 | Stop reason: HOSPADM

## 2024-08-27 RX ORDER — SODIUM CHLORIDE 0.9 % (FLUSH) 0.9 %
5-40 SYRINGE (ML) INJECTION EVERY 12 HOURS SCHEDULED
Status: DISCONTINUED | OUTPATIENT
Start: 2024-08-27 | End: 2024-08-30 | Stop reason: HOSPADM

## 2024-08-27 RX ORDER — POTASSIUM CHLORIDE 7.45 MG/ML
10 INJECTION INTRAVENOUS PRN
Status: DISCONTINUED | OUTPATIENT
Start: 2024-08-27 | End: 2024-08-30 | Stop reason: HOSPADM

## 2024-08-27 RX ORDER — ENOXAPARIN SODIUM 100 MG/ML
40 INJECTION SUBCUTANEOUS DAILY
Status: DISCONTINUED | OUTPATIENT
Start: 2024-08-27 | End: 2024-08-30 | Stop reason: HOSPADM

## 2024-08-27 RX ORDER — SODIUM CHLORIDE 9 MG/ML
INJECTION, SOLUTION INTRAVENOUS PRN
Status: DISCONTINUED | OUTPATIENT
Start: 2024-08-27 | End: 2024-08-30 | Stop reason: HOSPADM

## 2024-08-27 RX ORDER — ACETAMINOPHEN 325 MG/1
650 TABLET ORAL EVERY 6 HOURS PRN
Status: DISCONTINUED | OUTPATIENT
Start: 2024-08-27 | End: 2024-08-30 | Stop reason: HOSPADM

## 2024-08-27 RX ORDER — ROSUVASTATIN CALCIUM 40 MG/1
40 TABLET, COATED ORAL NIGHTLY
Status: DISCONTINUED | OUTPATIENT
Start: 2024-08-27 | End: 2024-08-30 | Stop reason: HOSPADM

## 2024-08-27 RX ORDER — VALSARTAN 80 MG/1
160 TABLET ORAL DAILY
Status: DISCONTINUED | OUTPATIENT
Start: 2024-08-27 | End: 2024-08-30 | Stop reason: HOSPADM

## 2024-08-27 RX ORDER — PANTOPRAZOLE SODIUM 40 MG/1
40 TABLET, DELAYED RELEASE ORAL
Status: DISCONTINUED | OUTPATIENT
Start: 2024-08-28 | End: 2024-08-30 | Stop reason: HOSPADM

## 2024-08-27 RX ORDER — POLYETHYLENE GLYCOL 3350 17 G/17G
17 POWDER, FOR SOLUTION ORAL DAILY PRN
Status: DISCONTINUED | OUTPATIENT
Start: 2024-08-27 | End: 2024-08-30 | Stop reason: HOSPADM

## 2024-08-27 RX ORDER — HYDROCHLOROTHIAZIDE 25 MG/1
50 TABLET ORAL DAILY
Status: DISCONTINUED | OUTPATIENT
Start: 2024-08-27 | End: 2024-08-30 | Stop reason: HOSPADM

## 2024-08-27 RX ORDER — SODIUM CHLORIDE 9 MG/ML
INJECTION, SOLUTION INTRAVENOUS CONTINUOUS
Status: DISCONTINUED | OUTPATIENT
Start: 2024-08-27 | End: 2024-08-30

## 2024-08-27 RX ADMIN — SODIUM CHLORIDE, PRESERVATIVE FREE 10 ML: 5 INJECTION INTRAVENOUS at 17:01

## 2024-08-27 RX ADMIN — POTASSIUM CHLORIDE 10 MEQ: 7.46 INJECTION, SOLUTION INTRAVENOUS at 19:58

## 2024-08-27 RX ADMIN — CITALOPRAM HYDROBROMIDE 40 MG: 20 TABLET ORAL at 20:24

## 2024-08-27 RX ADMIN — POTASSIUM CHLORIDE 10 MEQ: 7.46 INJECTION, SOLUTION INTRAVENOUS at 20:45

## 2024-08-27 RX ADMIN — ENOXAPARIN SODIUM 40 MG: 100 INJECTION SUBCUTANEOUS at 20:41

## 2024-08-27 RX ADMIN — ONDANSETRON 4 MG: 2 INJECTION INTRAMUSCULAR; INTRAVENOUS at 17:01

## 2024-08-27 RX ADMIN — ROSUVASTATIN 40 MG: 40 TABLET, FILM COATED ORAL at 20:24

## 2024-08-27 RX ADMIN — WATER 125 MG: 1 INJECTION INTRAMUSCULAR; INTRAVENOUS; SUBCUTANEOUS at 20:24

## 2024-08-27 RX ADMIN — MEROPENEM 1000 MG: 1 INJECTION INTRAVENOUS at 18:20

## 2024-08-27 RX ADMIN — SODIUM CHLORIDE: 9 INJECTION, SOLUTION INTRAVENOUS at 17:41

## 2024-08-27 RX ADMIN — SODIUM CHLORIDE, PRESERVATIVE FREE 10 ML: 5 INJECTION INTRAVENOUS at 20:03

## 2024-08-27 RX ADMIN — POTASSIUM CHLORIDE 10 MEQ: 7.46 INJECTION, SOLUTION INTRAVENOUS at 23:09

## 2024-08-27 RX ADMIN — POTASSIUM CHLORIDE 10 MEQ: 7.46 INJECTION, SOLUTION INTRAVENOUS at 22:11

## 2024-08-27 RX ADMIN — HYDROCHLOROTHIAZIDE 50 MG: 25 TABLET ORAL at 20:25

## 2024-08-27 RX ADMIN — VALSARTAN 160 MG: 80 TABLET ORAL at 20:24

## 2024-08-27 ASSESSMENT — PAIN DESCRIPTION - DESCRIPTORS
DESCRIPTORS: ACHING
DESCRIPTORS: ACHING

## 2024-08-27 ASSESSMENT — PAIN - FUNCTIONAL ASSESSMENT
PAIN_FUNCTIONAL_ASSESSMENT: PREVENTS OR INTERFERES SOME ACTIVE ACTIVITIES AND ADLS
PAIN_FUNCTIONAL_ASSESSMENT: PREVENTS OR INTERFERES SOME ACTIVE ACTIVITIES AND ADLS

## 2024-08-27 ASSESSMENT — PAIN DESCRIPTION - PAIN TYPE
TYPE: ACUTE PAIN
TYPE: ACUTE PAIN

## 2024-08-27 ASSESSMENT — PAIN SCALES - GENERAL
PAINLEVEL_OUTOF10: 8
PAINLEVEL_OUTOF10: 8

## 2024-08-27 ASSESSMENT — PAIN DESCRIPTION - LOCATION
LOCATION: GENERALIZED
LOCATION: GENERALIZED

## 2024-08-27 ASSESSMENT — PAIN DESCRIPTION - FREQUENCY
FREQUENCY: CONTINUOUS
FREQUENCY: CONTINUOUS

## 2024-08-27 NOTE — H&P
History and Physical    Patient:  Fany Olmedo  MRN: [id number]    Chief Complaint: UTI and polyarthralgia    History Obtained From:  patient, electronic medical record    PCP: Samy Irvin APRN - CNP    History of Present Illness:   The patient is a 69 y.o. female who presented as a direct admission from primary care office for concern for possible sepsis related to UTI.  Patient complained of polyarthralgias.  Her polyarthralgias include her knees, hips, fingers elbows and shoulders.  She stated initially she had a temporal biopsy completed in April 2024 for concern of temporal arteritis however was negative.  She stated since that time she has developed polyarthralgia.  She stated she developed a UTI and was placed on antibiotics.  She stated the most recent was in July in which she had been on Levaquin.  Patient had E. coli with ESBL.  She stated initially with the first UTI she was treated with Cipro and her polyarthralgia had worsened then was treated with Levaquin and worsened again.  Patient reported no history of recurrent UTIs until recently.  She denied any urinary incontinence.  She denied bath taking and only showers.  Patient complained of nausea but no vomiting.  She denied fever or chills.  She denied dizziness or syncope.  Patient denied any melena hematochezia.  She denied any diarrhea or constipation.  She denied any dysuria or hematuria.    Past Medical History:        Diagnosis Date    Arthritis 01/02/2024    physical therapy [facility]    Depression     History of colon polyps     Hyperlipidemia     Hypertension     PCP    FABIEN on CPAP     cpap    Other and unspecified hyperlipidemia     Polyarthralgia 08/27/2024    Polyp of cecum     PONV (postoperative nausea and vomiting)     Unspecified acute reaction to stress     Unspecified essential hypertension        Past Surgical History:        Procedure Laterality Date    COLONOSCOPY      COLONOSCOPY  10/26/2021    Dr. Priest -  \"DDIMER\"    PT/INR:  No results found for: \"PROTIME\", \"INR\"    High Sensitivity Troponin:  No results for input(s): \"TROPHS\" in the last 72 hours.    ABGs:   No results found for: \"PHART\", \"PH\", \"PZT9DZT\", \"PCO2\", \"PO2ART\", \"PO2\", \"JIM3IXJ\", \"HCO3\", \"BEART\", \"BE\", \"THGBART\", \"THB\", \"KYE1VXZ\", \"M7BVOXIJ\", \"O2SAT\", \"FIO2\"        CT ABDOMEN PELVIS WO CONTRAST Additional Contrast? None    (Results Pending)   XR CHEST (2 VW)    (Results Pending)           Assessment:    Principal Problem:    Complicated UTI (urinary tract infection)  Active Problems:    Essential hypertension    Polyarthralgia  Resolved Problems:    * No resolved hospital problems. *      Patient Active Problem List    Diagnosis Date Noted    Complicated UTI (urinary tract infection) 08/27/2024    Polyarthralgia 08/27/2024    Major depressive disorder, recurrent, mild 05/11/2023    Acute COVID-19 12/20/2021    Essential hypertension     Hyperlipidemia     Acute reaction to stress     Rosacea        Plan:     MEDICAL DECISION MAKING:    Primary Problem(s): Complicated UTI (urinary tract infection)  Differential diagnoses: UTI, kidney stone, pyelonephritis  Condition is an undiagnosed new problem with uncertain prognosis  Condition is stable  Treatment plan:   Appreciate infectious disease  Urine culture-pending  Blood culture x 2-pending  Monitor labs replace electrolytes  Imaging:   CT abdomen and pelvis-pending  Chest x-ray-pending  Medications:   IV fluids  IV meropenem  Medication Monitoring / High Risk Medications: none      Polyarthralgia  Condition is unchanged  Treatment plan:   Labs: CRP, ESR, RA panel, BRIANNE, anti-- SLE, uric acid  Imaging: no further imaging studies ordered today  Medications:   Solu-Medrol  mg x 1 dose  Continue Tylenol  IV Toradol    Nutrition status:   Well developed, well nourished with no malnutrition  Dietician consult initiated    Hospital Prophylaxis:   DVT: Lovenox   Stress Ulcer: PPI     MDM Data:   Test  interpretation:  My independent EKG interpretation: normal sinus rhythm  My independent X-ray interpretation:   Pending  Management and/or test interpretation discussed with  Dr. Cervantes  Consults and Nursing notes were personally reviewed, all current labs and imaging were personally reviewed, tests ordered: CBC, BMP, and history obtained by independent historian       Disposition:  Shared decision making: All test results, treatment options and disposition options were discussed with the patient today  Social determinants of health that may impact management: none  Code status: Full Code   Disposition: Discharge plan is pending        Critical Care Time:  Total critical care time caring for this patient with life threatening, unstable organ failure, including direct patient contact, management of life support systems, review of data including imaging and labs, discussions with other team members and physicians at least 0 minutes so far today, excluding separately billable procedures.      [facility] Medication Reconciliation documentation:    [x] I have utilized all available immediate resources to obtain, update, or review the patient's current medications (including all prescriptions, over-the-counter products, herbals, cannabinoid products and bitamin/mineral/dietary/nutritional supplements.  [If 'yes\", STOP HERE]     []  The patient is not eligible for medication reconciliation; the patient is in an emergent medical situation where delaying treatment would jeopardize the patient's health    []  I did NOT confirm, update or review the patient's current list of medications today.  [DOES NOT SATISFY [facility] PERFORMANCE]        [facility] Advanced Care Planning documentation:  [x] I have confirmed that the patient's Advance Care Plan is present, Code Status is documented, or surrogate decision maker is listed in the patient's medical record  [If \"yes\", STOP HERE]     [] The patient's Advance Care Plan is NOT present because:    []   I confirmed today that the patient does not wish or was not able to name a   surrogate decision maker or provide and advance care plan.    [] Hospice care is currently being provided or has been provided within the   calendar year.    []  I did NOT confirm today the presence of an Advance Care Plan or surrogate   decision maker documented within the patient's medical record.   [DOES NOT SATISFY [facility] PERFORMANCE]    CORE MEASURES  DVT prophylaxis: Lovenox  Decubitus ulcer present on admission: No  CODE STATUS: FULL CODE  Nutrition Status: good   Physical therapy: NA   Old Charts reviewed: Yes  EKG Reviewed: Yes  Advance Directive Addressed: Yes    JUAN Gray - CNP, APRN, NP-C  8/27/2024, 5:54 PM

## 2024-08-27 NOTE — PLAN OF CARE
Problem: Discharge Planning  Goal: Discharge to home or other facility with appropriate resources  Outcome: Progressing     Problem: Pain  Goal: Verbalizes/displays adequate comfort level or baseline comfort level  Outcome: Progressing     Problem: Safety - Adult  Goal: Free from fall injury  Outcome: Progressing     Problem: Nutrition Deficit:  Goal: Optimize nutritional status  Outcome: Progressing     Problem: Skin/Tissue Integrity  Goal: Absence of new skin breakdown  Outcome: Progressing

## 2024-08-27 NOTE — FLOWSHEET NOTE
IV fluids started as ordered. Kavon NP in to see patient. Orders received. States nausea much better with Zofran IVP given.

## 2024-08-27 NOTE — PROGRESS NOTES
Dr. Chahal called back. Updated on potassium level and new orders given at this time. See orders.

## 2024-08-27 NOTE — FLOWSHEET NOTE
69 year old female direct admitted to room 302 per w/c. To bed with assist. C/O joint pain all over. To bed with assist. Vitals checked and assessment done. Oriented to room and call light system. Call light within reach. Bed alarm on for safety. Son at bedside.

## 2024-08-27 NOTE — FLOWSHEET NOTE
Zofran IVP given for c/o nausea. EKG completed. Up to bathroom with assist. Urine specimen collected and sent to lab. To xray per w/c.

## 2024-08-28 LAB
25(OH)D3 SERPL-MCNC: 21.9 NG/ML (ref 30–100)
ANA SER QL IA: NEGATIVE
ANION GAP SERPL CALCULATED.3IONS-SCNC: 10 MMOL/L (ref 9–16)
BASOPHILS # BLD: <0.03 K/UL (ref 0–0.2)
BASOPHILS NFR BLD: 0 % (ref 0–2)
BUN SERPL-MCNC: 12 MG/DL (ref 8–23)
BUN/CREAT SERPL: 17 (ref 9–20)
CALCIUM SERPL-MCNC: 9 MG/DL (ref 8.6–10.4)
CHLORIDE SERPL-SCNC: 101 MMOL/L (ref 98–107)
CO2 SERPL-SCNC: 27 MMOL/L (ref 20–31)
CREAT SERPL-MCNC: 0.7 MG/DL (ref 0.5–0.9)
CRP SERPL HS-MCNC: 127 MG/L (ref 0–5)
DSDNA IGG SER QL IA: <0.5 IU/ML
EKG ATRIAL RATE: 73 BPM
EKG P AXIS: 38 DEGREES
EKG P-R INTERVAL: 180 MS
EKG Q-T INTERVAL: 396 MS
EKG QRS DURATION: 80 MS
EKG QTC CALCULATION (BAZETT): 436 MS
EKG R AXIS: 29 DEGREES
EKG T AXIS: 36 DEGREES
EKG VENTRICULAR RATE: 73 BPM
EOSINOPHIL # BLD: <0.03 K/UL (ref 0–0.44)
EOSINOPHILS RELATIVE PERCENT: 0 % (ref 1–4)
ERYTHROCYTE [DISTWIDTH] IN BLOOD BY AUTOMATED COUNT: 12.3 % (ref 11.8–14.4)
ERYTHROCYTE [SEDIMENTATION RATE] IN BLOOD BY PHOTOMETRIC METHOD: 27 MM/HR (ref 0–30)
ERYTHROCYTE [SEDIMENTATION RATE] IN BLOOD BY PHOTOMETRIC METHOD: 28 MM/HR (ref 0–30)
GFR, ESTIMATED: 89 ML/MIN/1.73M2
GLUCOSE SERPL-MCNC: 179 MG/DL (ref 74–99)
HCT VFR BLD AUTO: 35.1 % (ref 36.3–47.1)
HGB BLD-MCNC: 12.1 G/DL (ref 11.9–15.1)
IMM GRANULOCYTES # BLD AUTO: 0.03 K/UL (ref 0–0.3)
IMM GRANULOCYTES NFR BLD: 0 %
LYMPHOCYTES NFR BLD: 0.84 K/UL (ref 1.1–3.7)
LYMPHOCYTES RELATIVE PERCENT: 12 % (ref 24–43)
MCH RBC QN AUTO: 29.7 PG (ref 25.2–33.5)
MCHC RBC AUTO-ENTMCNC: 34.5 G/DL (ref 28.4–34.8)
MCV RBC AUTO: 86.2 FL (ref 82.6–102.9)
MICROORGANISM SPEC CULT: NORMAL
MONOCYTES NFR BLD: 0.08 K/UL (ref 0.1–1.2)
MONOCYTES NFR BLD: 1 % (ref 3–12)
NEUTROPHILS NFR BLD: 87 % (ref 36–65)
NEUTS SEG NFR BLD: 6.29 K/UL (ref 1.5–8.1)
NRBC BLD-RTO: 0 PER 100 WBC
NUCLEAR IGG SER IA-RTO: 0.1 U/ML
PLATELET # BLD AUTO: 180 K/UL (ref 138–453)
PMV BLD AUTO: 10.4 FL (ref 8.1–13.5)
POTASSIUM SERPL-SCNC: 4 MMOL/L (ref 3.7–5.3)
RBC # BLD AUTO: 4.07 M/UL (ref 3.95–5.11)
RHEUMATOID FACT SER NEPH-ACNC: <10 IU/ML (ref 0–13)
SODIUM SERPL-SCNC: 138 MMOL/L (ref 136–145)
SPECIMEN DESCRIPTION: NORMAL
URATE SERPL-MCNC: 3.9 MG/DL (ref 2.4–5.7)
WBC OTHER # BLD: 7.3 K/UL (ref 3.5–11.3)

## 2024-08-28 PROCEDURE — 86140 C-REACTIVE PROTEIN: CPT

## 2024-08-28 PROCEDURE — 2580000003 HC RX 258: Performed by: NURSE PRACTITIONER

## 2024-08-28 PROCEDURE — 82306 VITAMIN D 25 HYDROXY: CPT

## 2024-08-28 PROCEDURE — 93010 ELECTROCARDIOGRAM REPORT: CPT | Performed by: INTERNAL MEDICINE

## 2024-08-28 PROCEDURE — 36415 COLL VENOUS BLD VENIPUNCTURE: CPT

## 2024-08-28 PROCEDURE — 6360000002 HC RX W HCPCS: Performed by: INTERNAL MEDICINE

## 2024-08-28 PROCEDURE — 80048 BASIC METABOLIC PNL TOTAL CA: CPT

## 2024-08-28 PROCEDURE — 97162 PT EVAL MOD COMPLEX 30 MIN: CPT

## 2024-08-28 PROCEDURE — 82607 VITAMIN B-12: CPT

## 2024-08-28 PROCEDURE — 1200000000 HC SEMI PRIVATE

## 2024-08-28 PROCEDURE — 85652 RBC SED RATE AUTOMATED: CPT

## 2024-08-28 PROCEDURE — 6370000000 HC RX 637 (ALT 250 FOR IP): Performed by: NURSE PRACTITIONER

## 2024-08-28 PROCEDURE — 85025 COMPLETE CBC W/AUTO DIFF WBC: CPT

## 2024-08-28 PROCEDURE — 6360000002 HC RX W HCPCS: Performed by: NURSE PRACTITIONER

## 2024-08-28 RX ADMIN — ROSUVASTATIN 40 MG: 40 TABLET, FILM COATED ORAL at 20:32

## 2024-08-28 RX ADMIN — SODIUM CHLORIDE, PRESERVATIVE FREE 10 ML: 5 INJECTION INTRAVENOUS at 20:32

## 2024-08-28 RX ADMIN — POTASSIUM CHLORIDE 10 MEQ: 7.46 INJECTION, SOLUTION INTRAVENOUS at 00:05

## 2024-08-28 RX ADMIN — CITALOPRAM HYDROBROMIDE 40 MG: 20 TABLET ORAL at 08:32

## 2024-08-28 RX ADMIN — SODIUM CHLORIDE: 9 INJECTION, SOLUTION INTRAVENOUS at 02:28

## 2024-08-28 RX ADMIN — SODIUM CHLORIDE: 9 INJECTION, SOLUTION INTRAVENOUS at 16:22

## 2024-08-28 RX ADMIN — PANTOPRAZOLE SODIUM 40 MG: 40 TABLET, DELAYED RELEASE ORAL at 06:50

## 2024-08-28 RX ADMIN — MEROPENEM 1000 MG: 1 INJECTION INTRAVENOUS at 08:39

## 2024-08-28 RX ADMIN — HYDROCHLOROTHIAZIDE 50 MG: 25 TABLET ORAL at 08:32

## 2024-08-28 RX ADMIN — POTASSIUM CHLORIDE 10 MEQ: 7.46 INJECTION, SOLUTION INTRAVENOUS at 01:25

## 2024-08-28 RX ADMIN — VALSARTAN 160 MG: 80 TABLET ORAL at 08:33

## 2024-08-28 RX ADMIN — MEROPENEM 1000 MG: 1 INJECTION INTRAVENOUS at 17:09

## 2024-08-28 RX ADMIN — ATENOLOL 25 MG: 25 TABLET ORAL at 08:33

## 2024-08-28 RX ADMIN — MEROPENEM 1000 MG: 1 INJECTION INTRAVENOUS at 02:33

## 2024-08-28 RX ADMIN — ENOXAPARIN SODIUM 40 MG: 100 INJECTION SUBCUTANEOUS at 08:33

## 2024-08-28 NOTE — PLAN OF CARE
Problem: Discharge Planning  Goal: Discharge to home or other facility with appropriate resources  8/28/2024 0147 by Joelle Pompa RN  Outcome: Progressing  8/27/2024 1731 by Mary Ann Luu RN  Outcome: Progressing     Problem: Pain  Goal: Verbalizes/displays adequate comfort level or baseline comfort level  8/28/2024 0147 by Joelle Pompa RN  Outcome: Progressing  8/27/2024 1731 by Mary Ann Luu RN  Outcome: Progressing     Problem: Safety - Adult  Goal: Free from fall injury  8/28/2024 0147 by Joelle Pompa RN  Outcome: Progressing  8/27/2024 1731 by Mary Ann Luu RN  Outcome: Progressing     Problem: Nutrition Deficit:  Goal: Optimize nutritional status  8/28/2024 0147 by Joelle Pompa RN  Outcome: Progressing  8/27/2024 1731 by Mary Ann Luu RN  Outcome: Progressing     Problem: Skin/Tissue Integrity  Goal: Absence of new skin breakdown  Description: 1.  Monitor for areas of redness and/or skin breakdown  2.  Assess vascular access sites hourly  3.  Every 4-6 hours minimum:  Change oxygen saturation probe site  4.  Every 4-6 hours:  If on nasal continuous positive airway pressure, respiratory therapy assess nares and determine need for appliance change or resting period.  8/28/2024 0147 by Joelle Pompa RN  Outcome: Progressing  8/27/2024 1731 by Mary Ann Luu RN  Outcome: Progressing

## 2024-08-28 NOTE — PLAN OF CARE
Problem: Discharge Planning  Goal: Discharge to home or other facility with appropriate resources  8/28/2024 1303 by Vidhi Patel RN  Outcome: Progressing  8/28/2024 0147 by Joelle Pompa RN  Outcome: Progressing     Problem: Pain  Goal: Verbalizes/displays adequate comfort level or baseline comfort level  8/28/2024 1303 by Vidhi Patel RN  Outcome: Progressing  8/28/2024 0147 by Joelle Pompa RN  Outcome: Progressing     Problem: Safety - Adult  Goal: Free from fall injury  8/28/2024 1303 by Vidhi Patel RN  Outcome: Progressing  8/28/2024 0147 by Joelle Pompa RN  Outcome: Progressing     Problem: Nutrition Deficit:  Goal: Optimize nutritional status  8/28/2024 1303 by Vidhi Patel RN  Outcome: Progressing  8/28/2024 0930 by Pavan Deleon, RD, LD  Outcome: Progressing  Flowsheets (Taken 8/28/2024 0921)  Nutrient intake appropriate for improving, restoring, or maintaining nutritional needs: Monitor oral intake, labs, and treatment plans  Note: Nutrition Problem #1: Predicted inadequate energy intake  Intervention: Food and/or Nutrient Delivery: Continue Current Diet    8/28/2024 0147 by Joelle Pompa RN  Outcome: Progressing     Problem: Skin/Tissue Integrity  Goal: Absence of new skin breakdown  Description: 1.  Monitor for areas of redness and/or skin breakdown  2.  Assess vascular access sites hourly  3.  Every 4-6 hours minimum:  Change oxygen saturation probe site  4.  Every 4-6 hours:  If on nasal continuous positive airway pressure, respiratory therapy assess nares and determine need for appliance change or resting period.  8/28/2024 1303 by Vidhi Patel RN  Outcome: Progressing  8/28/2024 0147 by Joelle Pompa RN  Outcome: Progressing

## 2024-08-28 NOTE — VIRTUAL HEALTH
Fany Olmedo, was evaluated through a synchronous (real-time) audio-video encounter. The patient (and/or guardian if applicable) is aware that this is a billable service, which includes applicable co-pays. This virtual visit was conducted with patient's (and/or legal guardian's) consent. Patient identification was verified, and a caregiver was present when appropriate.  The patient was located at Facility (Appt Department): [facility] MED SURG  [address] [address]  Loc: [phone]  The provider was located at Facility (Login Dept): [facility]  [address] [id number] [phone]  Confirm you are appropriately licensed, registered, or certified to deliver care in the [facility] where the patient is located as indicated above. If you are not or unsure, please re-schedule the visit: Yes, I confirm.   Consults     Total time spent on this encounter: Not billed by time    --Missael Wolf MD on 8/28/2024 at 9:57 AM    An electronic signature was used to authenticate this note.      [facility] - Initial Consult Note  Today's Date and Time: 8/28/2024, 9:57 AM    Impression :   Urinary tract infection  History of prior E. coli ESBL complicated urinary tract infection  Concern for sepsis  Osteoarthritis  Polyarthralgia  Worsening of symptoms after treatment with ciprofloxacin and Levaquin    Recommendations:   Continue meropenem 1 g IV every 8 hours, pending culture data  Avoid administration of any quinolone antibiotics in the future.   Polyarthralgia can be a side effect of quinolone antibiotics    Medical Decision Making/Summary/Discussion:8/28/2024     Daily Elements of Decision Making provided by Consulting Physician:    Note: I have independently performed the steps listed below as part of the medical decision making and evaluation.    Review of current Problems:  Today I am seeing the patient for the following  08/27/24  1550 08/28/24  0510    138   K 2.8* 4.0   CL 96* 101   CO2 27 27   BUN 13 12   CREATININE 0.7 0.7   MG 1.6  --      Hepatic Function Panel:   Recent Labs     08/27/24  1550   BILITOT 0.7   ALKPHOS 54   ALT 22   AST 19     No results for input(s): \"RPR\" in the last 72 hours.  No results for input(s): \"HIV\" in the last 72 hours.  No results for input(s): \"BC\" in the last 72 hours.  Lab Results   Component Value Date/Time    BACTERIA TRACE 08/27/2024 05:10 PM    RBC 4.07 08/28/2024 05:10 AM    RBC 4.57 10/30/2023 08:50 AM    WBC 7.3 08/28/2024 05:10 AM    YEAST MODERATE 07/01/2024 01:33 PM    TURBIDITY Clear 08/27/2024 05:10 PM     Lab Results   Component Value Date/Time    CREATININE 0.7 08/28/2024 05:10 AM    GLUCOSE 179 08/28/2024 05:10 AM    GLUCOSE 98 02/16/2024 08:35 AM       Medical Decision Making-Imaging:   CT ABDOMEN PELVIS WO CONTRAST Additional Contrast? None    Result Date: 8/27/2024  EXAMINATION: CT OF THE ABDOMEN AND PELVIS WITHOUT CONTRAST 8/27/2024 5:25 pm TECHNIQUE: CT of the abdomen and pelvis was performed without the administration of intravenous contrast. Multiplanar reformatted images are provided for review. Automated exposure control, iterative reconstruction, and/or weight based adjustment of the mA/kV was utilized to reduce the radiation dose to as low as reasonably achievable. COMPARISON: None. HISTORY: ORDERING SYSTEM PROVIDED HISTORY: abd pain / ? stones TECHNOLOGIST PROVIDED HISTORY: abd pain / ? stones FINDINGS: Liver: Normal Gallbladder: Normal Biliary System: Non-dilated. Pancreas: Normal. Spleen: Normal. Adrenals: Normal. Kidneys: Normal symmetric enhancement. No nephroliths. Ureters: Normal in course and caliber. Bladder: Normal. Pelvis: Normal. Stomach: Normal. Duodenum: Normal. Small Bowel: Normal. Colon: Normal. Appendix: The appendix is surgically absent. Lymph Nodes: No lymphadenopathy. Peritoneum: No ascites or free air. No fluid collection. Retroperitoneum:  Normal. Vessels: Normal caliber vessels. Abdominal Wall: Normal. Bones: No acute osseous findings. Lung Bases: Solid 5 mm subpleural nodule within the lateral segment right middle lobe.  Small right-sided pleural effusion.  Subsegmental atelectasis of the bilateral lower lobes.. Inferior Mediastinum: Normal.     1. No acute intra-abdominal or pelvic process. 2. Small right-sided pleural effusion. 3. Solid 5 mm subpleural nodule within the lateral segment right middle lobe. Consider 12 month follow-up if the patient has risk factors for lung cancer.     XR CHEST (2 VW)    Result Date: 8/27/2024  EXAMINATION: TWO XRAY VIEWS OF THE CHEST 8/27/2024 5:28 pm COMPARISON: CT abdomen and pelvis 08/27/2024 HISTORY: ORDERING SYSTEM PROVIDED HISTORY: ill TECHNOLOGIST PROVIDED HISTORY: ill FINDINGS: Lungs: Clear. Pleura: No effusion or pneumothorax. Cardiomediastinal silhouette: Normal contours. Bones: No acute osseous findings. Soft tissues: Normal.     No acute cardiopulmonary process.       Medical Decision [id number]:     Results       Procedure Component Value Units Date/Time    Culture, Blood 1 [[id number]] Collected: 08/27/24 1818    Order Status: Sent Specimen: Blood Updated: 08/27/24 1834    Urine culture [[id number]] Collected: 08/27/24 1710    Order Status: Sent Specimen: Urine Updated: 08/27/24 1724    Culture, Blood 2 [[id number]] Collected: 08/27/24 1550    Order Status: Sent Specimen: Blood Updated: 08/27/24 1848              Medical Decision Making-Other:     Note:    Thank you for allowing us to participate in the care of this patient. Please call with questions.    Missael Wolf MD  Office: [phone]

## 2024-08-28 NOTE — CARE COORDINATION
Case Management Assessment  Initial Evaluation    Date/Time of Evaluation: 8/28/2024 11:42 AM  Assessment Completed by: YVONNE Aaron    If patient is discharged prior to next notation, then this note serves as note for discharge by case management.    Patient Name: Fany Olmedo                   YOB: 1955  Diagnosis: Complicated UTI (urinary tract infection) [N39.0]                   Date / Time: 8/27/2024  3:01 PM    Patient Admission Status: Inpatient   Readmission Risk (Low < 19, Mod (19-27), High > 27): Readmission Risk Score: 8.8    Current PCP: Samy Irvin APRN - CNP  PCP verified by CM? Yes    Chart Reviewed: Yes      History Provided by: Patient, Medical Record  Patient Orientation: Alert and Oriented, Person, Place, Situation, Self    Patient Cognition: Alert    Hospitalization in the last 30 days (Readmission):  No    If yes, Readmission Assessment in CM Navigator will be completed.    Advance Directives:      Code Status: Full Code   Patient's Primary Decision Maker is: Legal Next of Kin      Discharge Planning:    Patient lives with: Family Members Type of Home: House  Primary Care Giver: Self  Patient Support Systems include: Children, Family Members, Friends/Neighbors   Current Financial resources: Medicare  Current community resources: None  Current services prior to admission: C-pap, Durable Medical Equipment            Current DME: Cpap            Type of Home Care services:  None    ADLS  Prior functional level: Independent in ADLs/IADLs  Current functional level: Independent in ADLs/IADLs    PT AM-PAC:   /24  OT AM-PAC:   /24    Family can provide assistance at DC: Yes  Would you like Case Management to discuss the discharge plan with any other family members/significant others, and if so, who? No  Plans to Return to Present Housing: Yes  Other Identified Issues/Barriers to RETURNING to current housing: No barriers noted at present   Potential  patient's individualized plan of care/goals and shares the quality data associated with the providers was provided to: Patient   Patient Representative Name:       The Patient and/or Patient Representative Agree with the Discharge Plan? Yes    YVONNE Aaron  Case Management Department  Ph: [phone] Fax: [phone]

## 2024-08-28 NOTE — PROGRESS NOTES
Nutrition Assessment     Type and Reason for Visit: Initial    Nutrition Recommendations/Plan:   Encourage oral intakes  Monitor nausea return.  Recommend check vit D annually with history of low values.      Malnutrition Assessment:  Malnutrition Status: No malnutrition    Nutrition Assessment:  Predicted suboptimal nutrient intakes r/t altered renal function, AEB low appetite x2 days pta with nausea. Improving appetite and intakes. Still some nausea which is being controlled. Mild weight increases over time noted, reporting \"stable weight\" pta. Denies nutrition questions or concerns currently. History of low vit D without current supplementation. May benefit from annual evaluation and supplementation as indicated.    Nutrition Related Findings:   well nourished. active b/s. Wound Type: None    Current Nutrition Therapies:    ADULT DIET; Regular    Anthropometric Measures:  Height: 154.9 cm (5' 1\")  Current Body Wt: 73.8 kg (162 lb 11.2 oz)   BMI: 30.8    Nutrition Diagnosis:   Predicted inadequate energy intake related to renal dysfunction as evidenced by nausea, poor intake prior to admission    Lab Results   Component Value Date     08/28/2024    K 4.0 08/28/2024     08/28/2024    CO2 27 08/28/2024    BUN 12 08/28/2024    CREATININE 0.7 08/28/2024    GLUCOSE 179 (H) 08/28/2024    CALCIUM 9.0 08/28/2024    BILITOT 0.7 08/27/2024    ALKPHOS 54 08/27/2024    AST 19 08/27/2024    ALT 22 08/27/2024    LABGLOM 89 08/28/2024    GFRAA >60 10/22/2021     Hemoglobin A1C   Date Value Ref Range Status   08/13/2024 5.5 4.0 - 6.0 % Final     Lab Results   Component Value Date    VITD25 24.1 (L) 05/16/2022     Nutrition Interventions:   Food and/or Nutrient Delivery: Continue Current Diet  Nutrition Education/Counseling: No recommendation at this time  Coordination of Nutrition Care: Continue to monitor while inpatient  Plan of Care discussed with: patient    Goals:     Goals: Meet at least 75% of estimated needs

## 2024-08-28 NOTE — PROGRESS NOTES
Assessment and vitals obtained at this time as charted. Pt is A&O x4. Pt is c/o generalized weakness and pain.Pt is resting in bed watching TV.  Pt denies any further needs at this time. Call light within reach, care ongoing.

## 2024-08-28 NOTE — PROGRESS NOTES
Progress Note    SUBJECTIVE:    Patient seen for f/u of Complicated UTI (urinary tract infection).  She resting in bed no distress.  Complained of HA.  No back pain.  Patient reported relief of joint pain with IV steroids.    ROS:   Constitutional: negative  for fevers, and negative for chills.  Respiratory: negative for shortness of breath, negative for cough, and negative for wheezing  Cardiovascular: negative for chest pain, and negative for palpitations  Gastrointestinal: negative for abdominal pain, negative for nausea,negative for vomiting, negative for diarrhea, and negative for constipation     All other systems were reviewed with the patient and are negative unless otherwise stated in HPI      OBJECTIVE:      Vitals:   Vitals:    08/28/24 0647   BP: 125/72   Pulse: 61   Resp: 16   Temp: 97.5 °F (36.4 °C)   SpO2: 98%     Weight - Scale: 73.8 kg (162 lb 11.2 oz)   Height: 154.9 cm (5' 1\")     Weight  Wt Readings from Last 3 Encounters:   08/28/24 73.8 kg (162 lb 11.2 oz)   08/27/24 70.8 kg (156 lb)   08/07/24 72.6 kg (160 lb)     Body mass index is 30.74 kg/m².    24HR INTAKE/OUTPUT:      Intake/Output Summary (Last 24 hours) at 8/28/2024 0729  Last data filed at 8/28/2024 0717  Gross per 24 hour   Intake 1778.04 ml   Output 1150 ml   Net 628.04 ml     -----------------------------------------------------------------  Exam:    GEN:    Awake, alert and oriented x3.   EYES:  EOMI, pupils equal   NECK: Supple. No lymphadenopathy.  No carotid bruit  CVS:    regular rate and rhythm, no audible murmur  PULM:  CTA, no wheezes, rales or rhonchi, no acute respiratory distress  ABD:    Bowels sounds normal.  Abdomen is soft.  No distention.  no tenderness to palpation.   EXT:   no edema bilaterally .  No calf tenderness.   NEURO: Moves all extremities.  Motor and sensory are grossly intact  SKIN:  No rashes.  No skin lesions.    -----------------------------------------------------------------    Diagnostic  5 mm subpleural nodule within the lateral segment right middle lobe.   Consider 12 month follow-up if the patient has risk factors for lung cancer.               ASSESSMENT / PLAN:    MEDICAL DECISION MAKING:    Primary Problem(s): Complicated UTI (urinary tract infection)  Differential diagnoses: UTI, kidney stone, pyelonephritis  Condition is an undiagnosed new problem with uncertain prognosis  Condition is stable  Treatment plan:   Appreciate infectious disease  Urine culture-pending  Blood culture x 2-pending  Monitor labs replace electrolytes  Imaging:   CT abdomen and pelvis-pending  Chest x-ray-pending  Medications:   IV fluids  IV meropenem  Medication Monitoring / High Risk Medications: none      Polyarthralgia  Condition is unchanged  Treatment plan:   Trend CRP-initially 125.0  Trend ESR-initially 27  RA panel-  GUILLERMO antibody screen-pending  Anti ds DNA-pending  Rheumatoid factor- < 10  BRIANNE-pending  Anti-- SLE-pending  Uric acid-3.9  Add vitamin D and B12 studies today  Imaging: no further imaging studies ordered today  Medications:   Solu-Medrol  mg x 1 dose on 8/27  Continue Tylenol  IV Toradol     Nutrition status:   Well developed, well nourished with no malnutrition  Dietician consult initiated     Hospital Prophylaxis:   DVT: Lovenox   Stress Ulcer: PPI      Disposition:  Shared decision making: All test results, treatment options and disposition options were discussed with the patient today  Social determinants of health that may impact management: none  Code status: Full Code   Disposition: Discharge plan is pending    [facility] Advanced Care Planning documentation:  [x] I have confirmed that the patient's Advance Care Plan is present, Code Status is documented, or surrogate decision maker is listed in the patient's medical record  [If \"yes\", STOP HERE]     [] The patient's Advance Care Plan is NOT present because:    []  I confirmed today that the patient does not wish or was not able to name a

## 2024-08-28 NOTE — PROGRESS NOTES
Physical Therapy  Facility/Department: [facility] MED SURG  Physical Therapy Initial Assessment    Name: Fany Olmedo  : 1955  MRN: [id number]  Date of Service: 2024    Discharge Recommendations:  Home independently, Home with assist PRN      Past Medical History:  has a past medical history of Arthritis, Depression, History of colon polyps, Hyperlipidemia, Hypertension, FABIEN on CPAP, Other and unspecified hyperlipidemia, Polyarthralgia, Polyp of cecum, PONV (postoperative nausea and vomiting), Unspecified acute reaction to stress, and Unspecified essential hypertension.  Past Surgical History:  has a past surgical history that includes Hysterectomy; Tubal ligation; Colonoscopy; Colonoscopy (10/26/2021); Colonoscopy (N/A, 10/26/2021); Colonoscopy (N/A, 2023); Colonoscopy (2024); Colonoscopy (N/A, 2024); and right colectomy (2023).    Assessment  Assessment: Patient is 69 year old female with dx of complicated UTI who presents as safe and independent with all transfers and ambulation. No further PT required at this time.  Therapy Prognosis: Good  Decision Making: Medium Complexity  No Skilled PT: Independent with functional mobility   Requires PT Follow-Up: No  Activity Tolerance  Activity Tolerance: Patient tolerated evaluation without incident;Patient limited by pain    Plan  Safety Devices  Type of Devices: All charla prominences offloaded, All fall risk precautions in place, Left in bed, Call light within reach    Restrictions  Restrictions/Precautions  Restrictions/Precautions: Fall Risk, General Precautions     Subjective  General  Chart Reviewed: Yes  Patient assessed for rehabilitation services?: Yes  Response To Previous Treatment: Not applicable  Family / Caregiver Present: No  Referring Practitioner: Lyric Steen CNP  Referral Date : 24  Diagnosis: Complicated UTI, N39.0  Follows Commands: Within Functional Limits  Subjective  Subjective: Pt reports pain in R hip  adductors but does not rate at this time. Agrees to PT eval         Social/Functional History  Social/Functional History  Lives With: Son, Family  Type of Home: House  Home Layout: One level  Home Access: Ramped entrance, Stairs to enter with rails  Entrance Stairs - Number of Steps: 2  Entrance Stairs - Rails: Right  Home Equipment: None  Has the patient had two or more falls in the past year or any fall with injury in the past year?: No  Receives Help From: Family  ADL Assistance: Independent  Homemaking Assistance: Independent  Homemaking Responsibilities: Yes  Ambulation Assistance: Independent  Transfer Assistance: Independent  Active : Yes  Additional Comments: Pt shares IADL's with her son, uses no AD for mobility.  Vision/Hearing  Vision  Vision: Within Functional Limits  Hearing  Hearing: Within functional limits    Cognition   Orientation  Overall Orientation Status: Within Functional Limits  Cognition  Overall Cognitive Status: WFL    Objective  Temp: 97.5 °F (36.4 °C)  Pulse: 61  Heart Rate Source: Monitor  Respirations: 16  SpO2: 98 %  O2 Device: None (Room air)  BP: 125/72  MAP (Calculated): 90  BP Location: Left upper arm  BP Method: Automatic  Patient Position: Supine        Gross Assessment  AROM: Within functional limits  Strength: Generally decreased, functional    Bed mobility  Supine to Sit: Modified independent  Sit to Supine: Modified independent  Transfers  Sit to Stand: Modified independent  Stand to Sit: Modified independent  Ambulation  Surface: Level tile  Device: No Device  Assistance: Independent  Distance: Pt amb 25ft with no AD, IND with no LOB or fatigue     Balance  Sitting - Static: Good  Sitting - Dynamic: Fair;+  Standing - Static: Fair  Standing - Dynamic: Fair          AM-PAC - Mobility  AM-PAC Mobility without Stair Climbing Inpatient   How much difficulty turning over in bed?: None  How much difficulty sitting down on / standing up from a chair with arms?: None  How

## 2024-08-28 NOTE — PROGRESS NOTES
[facility]  Inpatient/Observation/Outpatient Rehabilitation    Date: 2024  Patient Name: Fany Olmedo       [x] Inpatient Acute/Observation       []  Outpatient  : 1955         [] Pt no showed for scheduled appointment    [] As a reminder, pt was contacted/attempted contact via phone of upcoming appointments.    [] Pt refused/declined therapy at this time due to:         [] Pt cancelled due to:  [] No Reason Given   [] Sick/ill   [] Other:    [] Evaluation held by RN/Provider due to:    [] High Heart Rate   [] High Blood Pressure   [] Orthopedic Consult   [] Hgb < 7   [] Other:    [x] Pt does not require skilled services due to:  Patient with no concerns regarding self care. Confirmed patient's independence with nurse.             Kylah Bender OTR/L Date: 2024

## 2024-08-29 LAB
ANION GAP SERPL CALCULATED.3IONS-SCNC: 8 MMOL/L (ref 9–16)
BASOPHILS # BLD: 0 K/UL (ref 0–0.2)
BASOPHILS NFR BLD: 0 % (ref 0–2)
BUN SERPL-MCNC: 13 MG/DL (ref 8–23)
BUN/CREAT SERPL: 19 (ref 9–20)
CALCIUM SERPL-MCNC: 8.4 MG/DL (ref 8.6–10.4)
CHLORIDE SERPL-SCNC: 103 MMOL/L (ref 98–107)
CO2 SERPL-SCNC: 26 MMOL/L (ref 20–31)
CREAT SERPL-MCNC: 0.7 MG/DL (ref 0.5–0.9)
CRP SERPL HS-MCNC: 43.5 MG/L (ref 0–5)
EOSINOPHIL # BLD: 0.13 K/UL (ref 0–0.44)
EOSINOPHILS RELATIVE PERCENT: 1 % (ref 1–4)
ERYTHROCYTE [DISTWIDTH] IN BLOOD BY AUTOMATED COUNT: 12.4 % (ref 11.8–14.4)
ERYTHROCYTE [SEDIMENTATION RATE] IN BLOOD BY PHOTOMETRIC METHOD: 14 MM/HR (ref 0–30)
GFR, ESTIMATED: 89 ML/MIN/1.73M2
GLUCOSE SERPL-MCNC: 114 MG/DL (ref 74–99)
HCT VFR BLD AUTO: 30 % (ref 36.3–47.1)
HGB BLD-MCNC: 10.5 G/DL (ref 11.9–15.1)
IMM GRANULOCYTES # BLD AUTO: 0 K/UL (ref 0–0.3)
IMM GRANULOCYTES NFR BLD: 0 %
LYMPHOCYTES NFR BLD: 2.1 K/UL (ref 1.1–3.7)
LYMPHOCYTES RELATIVE PERCENT: 16 % (ref 24–43)
MAGNESIUM SERPL-MCNC: 1.8 MG/DL (ref 1.6–2.4)
MCH RBC QN AUTO: 30.3 PG (ref 25.2–33.5)
MCHC RBC AUTO-ENTMCNC: 35 G/DL (ref 28.4–34.8)
MCV RBC AUTO: 86.5 FL (ref 82.6–102.9)
MONOCYTES NFR BLD: 0.66 K/UL (ref 0.1–1.2)
MONOCYTES NFR BLD: 5 % (ref 3–12)
MORPHOLOGY: NORMAL
NEUTROPHILS NFR BLD: 78 % (ref 36–65)
NEUTS SEG NFR BLD: 10.21 K/UL (ref 1.5–8.1)
NRBC BLD-RTO: 0 PER 100 WBC
PLATELET # BLD AUTO: 173 K/UL (ref 138–453)
PMV BLD AUTO: 11 FL (ref 8.1–13.5)
POTASSIUM SERPL-SCNC: 3.5 MMOL/L (ref 3.7–5.3)
RBC # BLD AUTO: 3.47 M/UL (ref 3.95–5.11)
SODIUM SERPL-SCNC: 137 MMOL/L (ref 136–145)
VIT B12 SERPL-MCNC: 347 PG/ML (ref 232–1245)
WBC OTHER # BLD: 13.1 K/UL (ref 3.5–11.3)

## 2024-08-29 PROCEDURE — 86140 C-REACTIVE PROTEIN: CPT

## 2024-08-29 PROCEDURE — 6370000000 HC RX 637 (ALT 250 FOR IP): Performed by: INTERNAL MEDICINE

## 2024-08-29 PROCEDURE — 85025 COMPLETE CBC W/AUTO DIFF WBC: CPT

## 2024-08-29 PROCEDURE — 36415 COLL VENOUS BLD VENIPUNCTURE: CPT

## 2024-08-29 PROCEDURE — 6360000002 HC RX W HCPCS: Performed by: NURSE PRACTITIONER

## 2024-08-29 PROCEDURE — 1200000000 HC SEMI PRIVATE

## 2024-08-29 PROCEDURE — 83735 ASSAY OF MAGNESIUM: CPT

## 2024-08-29 PROCEDURE — 85652 RBC SED RATE AUTOMATED: CPT

## 2024-08-29 PROCEDURE — 2580000003 HC RX 258: Performed by: NURSE PRACTITIONER

## 2024-08-29 PROCEDURE — 2580000003 HC RX 258: Performed by: INTERNAL MEDICINE

## 2024-08-29 PROCEDURE — 80048 BASIC METABOLIC PNL TOTAL CA: CPT

## 2024-08-29 PROCEDURE — 6370000000 HC RX 637 (ALT 250 FOR IP)

## 2024-08-29 PROCEDURE — 6370000000 HC RX 637 (ALT 250 FOR IP): Performed by: NURSE PRACTITIONER

## 2024-08-29 RX ORDER — HYDROXYZINE PAMOATE 25 MG/1
50 CAPSULE ORAL NIGHTLY PRN
Status: DISCONTINUED | OUTPATIENT
Start: 2024-08-29 | End: 2024-08-30 | Stop reason: HOSPADM

## 2024-08-29 RX ORDER — ATENOLOL 25 MG/1
12.5 TABLET ORAL ONCE
Status: COMPLETED | OUTPATIENT
Start: 2024-08-29 | End: 2024-08-29

## 2024-08-29 RX ADMIN — PANTOPRAZOLE SODIUM 40 MG: 40 TABLET, DELAYED RELEASE ORAL at 06:36

## 2024-08-29 RX ADMIN — SODIUM CHLORIDE: 9 INJECTION, SOLUTION INTRAVENOUS at 05:53

## 2024-08-29 RX ADMIN — SODIUM CHLORIDE: 9 INJECTION, SOLUTION INTRAVENOUS at 17:14

## 2024-08-29 RX ADMIN — MEROPENEM 1000 MG: 1 INJECTION INTRAVENOUS at 01:17

## 2024-08-29 RX ADMIN — Medication 5 MG: at 01:24

## 2024-08-29 RX ADMIN — ACETAMINOPHEN 650 MG: 325 TABLET ORAL at 13:36

## 2024-08-29 RX ADMIN — HYDROCHLOROTHIAZIDE 50 MG: 25 TABLET ORAL at 07:33

## 2024-08-29 RX ADMIN — ENOXAPARIN SODIUM 40 MG: 100 INJECTION SUBCUTANEOUS at 07:33

## 2024-08-29 RX ADMIN — POTASSIUM CHLORIDE 40 MEQ: 1500 TABLET, EXTENDED RELEASE ORAL at 07:33

## 2024-08-29 RX ADMIN — CITALOPRAM HYDROBROMIDE 40 MG: 20 TABLET ORAL at 07:33

## 2024-08-29 RX ADMIN — ATENOLOL 12.5 MG: 25 TABLET ORAL at 07:33

## 2024-08-29 RX ADMIN — MEROPENEM 1000 MG: 1 INJECTION INTRAVENOUS at 17:13

## 2024-08-29 RX ADMIN — ROSUVASTATIN 40 MG: 40 TABLET, FILM COATED ORAL at 19:50

## 2024-08-29 RX ADMIN — VALSARTAN 160 MG: 80 TABLET ORAL at 07:33

## 2024-08-29 RX ADMIN — ACETAMINOPHEN 650 MG: 325 TABLET ORAL at 07:32

## 2024-08-29 RX ADMIN — ACETAMINOPHEN 650 MG: 325 TABLET ORAL at 19:53

## 2024-08-29 RX ADMIN — MEROPENEM 1000 MG: 1 INJECTION INTRAVENOUS at 07:40

## 2024-08-29 ASSESSMENT — PAIN - FUNCTIONAL ASSESSMENT
PAIN_FUNCTIONAL_ASSESSMENT: ACTIVITIES ARE NOT PREVENTED

## 2024-08-29 ASSESSMENT — PAIN DESCRIPTION - LOCATION
LOCATION: HEAD
LOCATION: GENERALIZED
LOCATION: GENERALIZED

## 2024-08-29 ASSESSMENT — PAIN DESCRIPTION - FREQUENCY
FREQUENCY: INTERMITTENT
FREQUENCY: INTERMITTENT

## 2024-08-29 ASSESSMENT — PAIN SCALES - GENERAL
PAINLEVEL_OUTOF10: 5
PAINLEVEL_OUTOF10: 0
PAINLEVEL_OUTOF10: 3
PAINLEVEL_OUTOF10: 2
PAINLEVEL_OUTOF10: 0
PAINLEVEL_OUTOF10: 5

## 2024-08-29 ASSESSMENT — PAIN DESCRIPTION - PAIN TYPE
TYPE: ACUTE PAIN
TYPE: ACUTE PAIN

## 2024-08-29 ASSESSMENT — PAIN DESCRIPTION - DESCRIPTORS
DESCRIPTORS: ACHING
DESCRIPTORS: DISCOMFORT
DESCRIPTORS: DISCOMFORT

## 2024-08-29 ASSESSMENT — PAIN DESCRIPTION - ONSET
ONSET: GRADUAL
ONSET: GRADUAL

## 2024-08-29 ASSESSMENT — PAIN DESCRIPTION - ORIENTATION: ORIENTATION: RIGHT;LEFT;ANTERIOR;POSTERIOR

## 2024-08-29 NOTE — PLAN OF CARE
Problem: Discharge Planning  Goal: Discharge to home or other facility with appropriate resources  8/29/2024 1214 by Vidhi Patel RN  Outcome: Progressing  8/29/2024 0238 by Joelle Pompa RN  Outcome: Progressing     Problem: Pain  Goal: Verbalizes/displays adequate comfort level or baseline comfort level  8/29/2024 1214 by Vidhi Patel RN  Outcome: Progressing  8/29/2024 0238 by Joelle Pompa RN  Outcome: Progressing     Problem: Safety - Adult  Goal: Free from fall injury  8/29/2024 1214 by Vidhi Patel RN  Outcome: Progressing  8/29/2024 0238 by Joelle Pompa RN  Outcome: Progressing     Problem: Nutrition Deficit:  Goal: Optimize nutritional status  8/29/2024 1214 by Vidhi Patel RN  Outcome: Progressing  8/29/2024 0238 by Joelle Pompa RN  Outcome: Progressing     Problem: Skin/Tissue Integrity  Goal: Absence of new skin breakdown  Description: 1.  Monitor for areas of redness and/or skin breakdown  2.  Assess vascular access sites hourly  3.  Every 4-6 hours minimum:  Change oxygen saturation probe site  4.  Every 4-6 hours:  If on nasal continuous positive airway pressure, respiratory therapy assess nares and determine need for appliance change or resting period.  8/29/2024 1214 by Vidhi Patel RN  Outcome: Progressing  8/29/2024 0238 by Joelle Pompa RN  Outcome: Progressing

## 2024-08-29 NOTE — PROGRESS NOTES
Assessment and vitals obtained at this time as charted. Pt is A&O x4 and denies pain. Pt states that she is feeling much better than yesterday. She is resting in bed at this time watching TV. Pt denies any further needs at this time. Call light within reach, care ongoing.

## 2024-08-29 NOTE — PROGRESS NOTES
Spoke with Dr. Cervantes via telephone regarding patients heart rate. New orders have been received and placed.

## 2024-08-29 NOTE — PROGRESS NOTES
Progress Note    SUBJECTIVE:  FU related to quite a bit more joint pains now that the steroids are out of her system.  No fevers or chills.  Did not sleep particularly well last night.    OBJECTIVE:    Vitals:   TEMPERATURE:  Current - Temp: 97.6 °F (36.4 °C); Max - Temp  Av.6 °F (36.4 °C)  Min: 97.5 °F (36.4 °C)  Max: 97.6 °F (36.4 °C)  RESPIRATIONS RANGE: Resp  Av  Min: 16  Max: 18  PULSE RANGE: Pulse  Av  Min: 61  Max: 65  BLOOD PRESSURE RANGE:  Systolic (24hrs), Av , Min:125 , Max:125   ; Diastolic (24hrs), Av, Min:72, Max:73    PULSE OXIMETRY RANGE: SpO2  Av %  Min: 96 %  Max: 98 %  24HR INTAKE/OUTPUT:    Intake/Output Summary (Last 24 hours) at 2024 0633  Last data filed at 2024 0537  Gross per 24 hour   Intake 2136.88 ml   Output 3800 ml   Net -1663.12 ml     -----------------------------------------------------------------  Exam:  General: A & O x3 and alert  HEENT: Supple neck & negative  Heart: Regular  Lungs: clear to auscultation bilaterally & no retractions  Abdomen: Normal & soft, No tenderness and BS normal  Extremities:  No edema   Neuro: NonFocal     -----------------------------------------------------------------  Diagnostic Data:  Lab Results   Component Value Date    WBC 13.1 (H) 2024    HGB 10.5 (L) 2024     2024       Lab Results   Component Value Date    BUN 13 2024    CREATININE 0.7 2024     2024    K 3.5 (L) 2024    CALCIUM 8.4 (L) 2024     2024    CO2 26 2024    LABGLOM 89 2024       Lab Results   Component Value Date    WBCUA None 2024    RBCUA 0 TO 2 2024    LEUKOCYTESUR NEGATIVE 2024    GLUCOSEU NEGATIVE 2024    KETUA NEGATIVE 2024    PROTEINU TRACE (A) 2024    HGBUR 1+ (A) 2024    CASTUA  2024     None Reference range defined for non-centrifuged specimen.    BACTERIA TRACE (A) 2024    YEAST MODERATE (A)

## 2024-08-29 NOTE — VIRTUAL HEALTH
Fany Olmedo, was evaluated through a synchronous (real-time) audio-video encounter. The patient (and/or guardian if applicable) is aware that this is a billable service, which includes applicable co-pays. This virtual visit was conducted with patient's (and/or legal guardian's) consent. Patient identification was verified, and a caregiver was present when appropriate.  The patient was located at Facility (Appt Department): [facility] MED SURG  [address] [facility]] [address]  Loc: [phone]  The provider was located at Facility (Login Dept): [facility]  [address] [facility] [id number] [phone]  Confirm you are appropriately licensed, registered, or certified to deliver care in the [facility] where the patient is located as indicated above. If you are not or unsure, please re-schedule the visit: Yes, I confirm.   Consults     Progress Note   Total time spent on this encounter: Not billed by time    --Missael Wolf MD on 8/29/2024 at 7:44 AM    An electronic signature was used to authenticate this note.      [facility] - Progress Note    Today's Date and Time: 8/29/2024, 7:44 AM    Impression :   Urinary tract infection  History of prior E. coli ESBL complicated urinary tract infection  Concern for sepsis  Blood cultures: No growth   Osteoarthritis  Polyarthralgia  Prior worsening of symptoms after treatment with ciprofloxacin and Levaquin    Recommendations:   Continue meropenem 1 g IV every 8 hours, pending culture data  Avoid administration of any quinolone antibiotics in the future.   Polyarthralgia can be a potential side effect of quinolone antibiotics    Medical Decision Making/Summary/Discussion:8/29/2024     Daily Elements of Decision Making provided by Consulting Physician:    Note: I have independently performed the steps listed below as part of the medical decision making and evaluation.    Review of current  Nodes: No lymphadenopathy. Peritoneum: No ascites or free air. No fluid collection. Retroperitoneum: Normal. Vessels: Normal caliber vessels. Abdominal Wall: Normal. Bones: No acute osseous findings. Lung Bases: Solid 5 mm subpleural nodule within the lateral segment right middle lobe.  Small right-sided pleural effusion.  Subsegmental atelectasis of the bilateral lower lobes.. Inferior Mediastinum: Normal.     1. No acute intra-abdominal or pelvic process. 2. Small right-sided pleural effusion. 3. Solid 5 mm subpleural nodule within the lateral segment right middle lobe. Consider 12 month follow-up if the patient has risk factors for lung cancer.     XR CHEST (2 VW)    Result Date: 8/27/2024  EXAMINATION: TWO XRAY VIEWS OF THE CHEST 8/27/2024 5:28 pm COMPARISON: CT abdomen and pelvis 08/27/2024 HISTORY: ORDERING SYSTEM PROVIDED HISTORY: ill TECHNOLOGIST PROVIDED HISTORY: ill FINDINGS: Lungs: Clear. Pleura: No effusion or pneumothorax. Cardiomediastinal silhouette: Normal contours. Bones: No acute osseous findings. Soft tissues: Normal.     No acute cardiopulmonary process.       Medical Decision Kygeeb-Mqoqzymd-Iklqh:     Results       Procedure Component Value Units Date/Time    Culture, Blood 1 [[id number]] Collected: 08/27/24 1818    Order Status: Sent Specimen: Blood Updated: 08/27/24 1834    Urine culture [[id number]] Collected: 08/27/24 1710    Order Status: Completed Specimen: Urine Updated: 08/28/24 2201     Specimen Description .URINE     Culture NO SIGNIFICANT GROWTH    Culture, Blood 2 [[id number]] Collected: 08/27/24 1550    Order Status: Sent Specimen: Blood Updated: 08/27/24 1848              Medical Decision Making-Other:     Note:    Thank you for allowing us to participate in the care of this patient. Please call with questions.    Missael Wolf MD  Office: [phone]

## 2024-08-29 NOTE — PLAN OF CARE
Problem: Discharge Planning  Goal: Discharge to home or other facility with appropriate resources  8/29/2024 0238 by Joelle Pompa RN  Outcome: Progressing  8/28/2024 1303 by Vidhi Patel RN  Outcome: Progressing     Problem: Pain  Goal: Verbalizes/displays adequate comfort level or baseline comfort level  8/29/2024 0238 by Joelle Pompa RN  Outcome: Progressing  8/28/2024 1303 by Vidhi Patel RN  Outcome: Progressing     Problem: Safety - Adult  Goal: Free from fall injury  8/29/2024 0238 by Joelle Pompa RN  Outcome: Progressing  8/28/2024 1303 by Vidhi Patel RN  Outcome: Progressing     Problem: Nutrition Deficit:  Goal: Optimize nutritional status  8/29/2024 0238 by Joelle Pmopa RN  Outcome: Progressing  8/28/2024 1303 by Vidhi Patel RN  Outcome: Progressing     Problem: Skin/Tissue Integrity  Goal: Absence of new skin breakdown  Description: 1.  Monitor for areas of redness and/or skin breakdown  2.  Assess vascular access sites hourly  3.  Every 4-6 hours minimum:  Change oxygen saturation probe site  4.  Every 4-6 hours:  If on nasal continuous positive airway pressure, respiratory therapy assess nares and determine need for appliance change or resting period.  8/29/2024 0238 by Joelle Pompa RN  Outcome: Progressing  8/28/2024 1303 by Vidhi Patel RN  Outcome: Progressing

## 2024-08-30 ENCOUNTER — APPOINTMENT (OUTPATIENT)
Dept: GENERAL RADIOLOGY | Age: 69
DRG: 556 | End: 2024-08-30
Attending: INTERNAL MEDICINE
Payer: MEDICARE

## 2024-08-30 VITALS
OXYGEN SATURATION: 97 % | TEMPERATURE: 96.5 F | HEART RATE: 57 BPM | DIASTOLIC BLOOD PRESSURE: 89 MMHG | HEIGHT: 61 IN | RESPIRATION RATE: 16 BRPM | SYSTOLIC BLOOD PRESSURE: 177 MMHG | WEIGHT: 163.36 LBS | BODY MASS INDEX: 30.84 KG/M2

## 2024-08-30 PROBLEM — E87.70 FLUID OVERLOAD: Status: ACTIVE | Noted: 2024-08-30

## 2024-08-30 LAB
ANION GAP SERPL CALCULATED.3IONS-SCNC: 9 MMOL/L (ref 9–16)
BASOPHILS # BLD: <0.03 K/UL (ref 0–0.2)
BASOPHILS NFR BLD: 0 % (ref 0–2)
BUN SERPL-MCNC: 14 MG/DL (ref 8–23)
BUN/CREAT SERPL: 20 (ref 9–20)
CALCIUM SERPL-MCNC: 8.2 MG/DL (ref 8.6–10.4)
CHLORIDE SERPL-SCNC: 106 MMOL/L (ref 98–107)
CO2 SERPL-SCNC: 26 MMOL/L (ref 20–31)
CREAT SERPL-MCNC: 0.7 MG/DL (ref 0.5–0.9)
CRP SERPL HS-MCNC: 20.9 MG/L (ref 0–5)
EKG ATRIAL RATE: 55 BPM
EKG P AXIS: 45 DEGREES
EKG P-R INTERVAL: 186 MS
EKG Q-T INTERVAL: 428 MS
EKG QRS DURATION: 82 MS
EKG QTC CALCULATION (BAZETT): 409 MS
EKG R AXIS: 45 DEGREES
EKG T AXIS: 42 DEGREES
EKG VENTRICULAR RATE: 55 BPM
EOSINOPHIL # BLD: 0.38 K/UL (ref 0–0.44)
EOSINOPHILS RELATIVE PERCENT: 7 % (ref 1–4)
ERYTHROCYTE [DISTWIDTH] IN BLOOD BY AUTOMATED COUNT: 12.8 % (ref 11.8–14.4)
ERYTHROCYTE [SEDIMENTATION RATE] IN BLOOD BY PHOTOMETRIC METHOD: 9 MM/HR (ref 0–30)
GFR, ESTIMATED: >90 ML/MIN/1.73M2
GLUCOSE SERPL-MCNC: 86 MG/DL (ref 74–99)
HCT VFR BLD AUTO: 30.1 % (ref 36.3–47.1)
HGB BLD-MCNC: 10.4 G/DL (ref 11.9–15.1)
IMM GRANULOCYTES # BLD AUTO: 0.03 K/UL (ref 0–0.3)
IMM GRANULOCYTES NFR BLD: 1 %
LYMPHOCYTES NFR BLD: 2.19 K/UL (ref 1.1–3.7)
LYMPHOCYTES RELATIVE PERCENT: 41 % (ref 24–43)
MCH RBC QN AUTO: 30.2 PG (ref 25.2–33.5)
MCHC RBC AUTO-ENTMCNC: 34.6 G/DL (ref 28.4–34.8)
MCV RBC AUTO: 87.5 FL (ref 82.6–102.9)
MONOCYTES NFR BLD: 0.34 K/UL (ref 0.1–1.2)
MONOCYTES NFR BLD: 6 % (ref 3–12)
NEUTROPHILS NFR BLD: 44 % (ref 36–65)
NEUTS SEG NFR BLD: 2.34 K/UL (ref 1.5–8.1)
NRBC BLD-RTO: 0 PER 100 WBC
PLATELET # BLD AUTO: 162 K/UL (ref 138–453)
PMV BLD AUTO: 10.9 FL (ref 8.1–13.5)
POTASSIUM SERPL-SCNC: 3.9 MMOL/L (ref 3.7–5.3)
RBC # BLD AUTO: 3.44 M/UL (ref 3.95–5.11)
SODIUM SERPL-SCNC: 141 MMOL/L (ref 136–145)
TROPONIN I SERPL HS-MCNC: 8 NG/L (ref 0–14)
WBC OTHER # BLD: 5.3 K/UL (ref 3.5–11.3)

## 2024-08-30 PROCEDURE — 85652 RBC SED RATE AUTOMATED: CPT

## 2024-08-30 PROCEDURE — 6360000002 HC RX W HCPCS: Performed by: NURSE PRACTITIONER

## 2024-08-30 PROCEDURE — 2580000003 HC RX 258: Performed by: NURSE PRACTITIONER

## 2024-08-30 PROCEDURE — 6370000000 HC RX 637 (ALT 250 FOR IP): Performed by: NURSE PRACTITIONER

## 2024-08-30 PROCEDURE — 84484 ASSAY OF TROPONIN QUANT: CPT

## 2024-08-30 PROCEDURE — 86140 C-REACTIVE PROTEIN: CPT

## 2024-08-30 PROCEDURE — 80048 BASIC METABOLIC PNL TOTAL CA: CPT

## 2024-08-30 PROCEDURE — 93010 ELECTROCARDIOGRAM REPORT: CPT | Performed by: INTERNAL MEDICINE

## 2024-08-30 PROCEDURE — 71045 X-RAY EXAM CHEST 1 VIEW: CPT

## 2024-08-30 PROCEDURE — 36415 COLL VENOUS BLD VENIPUNCTURE: CPT

## 2024-08-30 PROCEDURE — 85025 COMPLETE CBC W/AUTO DIFF WBC: CPT

## 2024-08-30 PROCEDURE — 93005 ELECTROCARDIOGRAM TRACING: CPT

## 2024-08-30 PROCEDURE — 6360000002 HC RX W HCPCS: Performed by: INTERNAL MEDICINE

## 2024-08-30 RX ORDER — FUROSEMIDE 10 MG/ML
20 INJECTION INTRAMUSCULAR; INTRAVENOUS ONCE
Status: COMPLETED | OUTPATIENT
Start: 2024-08-30 | End: 2024-08-30

## 2024-08-30 RX ORDER — POTASSIUM CHLORIDE 1500 MG/1
20 TABLET, EXTENDED RELEASE ORAL DAILY
Qty: 30 TABLET | Refills: 5 | Status: SHIPPED | OUTPATIENT
Start: 2024-08-30

## 2024-08-30 RX ADMIN — MEROPENEM 1000 MG: 1 INJECTION INTRAVENOUS at 01:32

## 2024-08-30 RX ADMIN — VALSARTAN 160 MG: 80 TABLET ORAL at 07:59

## 2024-08-30 RX ADMIN — ACETAMINOPHEN 650 MG: 325 TABLET ORAL at 07:58

## 2024-08-30 RX ADMIN — PANTOPRAZOLE SODIUM 40 MG: 40 TABLET, DELAYED RELEASE ORAL at 06:53

## 2024-08-30 RX ADMIN — ATENOLOL 25 MG: 25 TABLET ORAL at 07:59

## 2024-08-30 RX ADMIN — ENOXAPARIN SODIUM 40 MG: 100 INJECTION SUBCUTANEOUS at 07:59

## 2024-08-30 RX ADMIN — SODIUM CHLORIDE, PRESERVATIVE FREE 10 ML: 5 INJECTION INTRAVENOUS at 07:59

## 2024-08-30 RX ADMIN — CITALOPRAM HYDROBROMIDE 40 MG: 20 TABLET ORAL at 07:58

## 2024-08-30 RX ADMIN — HYDROCHLOROTHIAZIDE 50 MG: 25 TABLET ORAL at 07:59

## 2024-08-30 RX ADMIN — FUROSEMIDE 20 MG: 10 INJECTION, SOLUTION INTRAMUSCULAR; INTRAVENOUS at 07:59

## 2024-08-30 ASSESSMENT — PAIN DESCRIPTION - ONSET: ONSET: GRADUAL

## 2024-08-30 ASSESSMENT — PAIN SCALES - GENERAL
PAINLEVEL_OUTOF10: 4
PAINLEVEL_OUTOF10: 0

## 2024-08-30 ASSESSMENT — PAIN DESCRIPTION - DESCRIPTORS: DESCRIPTORS: ACHING

## 2024-08-30 ASSESSMENT — PAIN DESCRIPTION - FREQUENCY: FREQUENCY: INTERMITTENT

## 2024-08-30 ASSESSMENT — PAIN DESCRIPTION - PAIN TYPE: TYPE: ACUTE PAIN

## 2024-08-30 ASSESSMENT — PAIN - FUNCTIONAL ASSESSMENT: PAIN_FUNCTIONAL_ASSESSMENT: ACTIVITIES ARE NOT PREVENTED

## 2024-08-30 ASSESSMENT — PAIN DESCRIPTION - LOCATION: LOCATION: GENERALIZED

## 2024-08-30 NOTE — DISCHARGE SUMMARY
Discharge Summary    Fany Olmedo  :  1955  MRN:  [id number]    Admit date:  2024      Discharge date:    2024    Admitting Physician:  Zoran Cervantes MD    Discharge Diagnoses:      Principal Problem:    Complicated UTI (urinary tract infection) /history of ESBL  Active Problems:    Essential hypertension    Polyarthralgia from quinolones    Fluid overload from IV fluids  Resolved Problems:    * No resolved hospital problems. *      Active Hospital Problems    Diagnosis Date Noted    Fluid overload from IV fluids [E87.70] 2024    Complicated UTI (urinary tract infection) /history of ESBL [N39.0] 2024    Polyarthralgia from quinolones [M25.50] 2024    Essential hypertension [I10]        Discharge Medications:         Medication List        START taking these medications      potassium chloride 20 MEQ extended release tablet  Commonly known as: KLOR-CON M  Take 1 tablet by mouth daily            CONTINUE taking these medications      atenolol 25 MG tablet  Commonly known as: TENORMIN  TAKE 1 TABLET EVERY MORNING     citalopram 40 MG tablet  Commonly known as: CeleXA  Take 1 tablet by mouth daily     hydroCHLOROthiazide 50 MG tablet  Commonly known as: HYDRODIURIL  TAKE 1 TABLET DAILY     omeprazole 20 MG delayed release capsule  Commonly known as: PRILOSEC  TAKE 1 CAPSULE DAILY     rosuvastatin 40 MG tablet  Commonly known as: Crestor  Take 1 tablet by mouth nightly     valsartan 160 MG tablet  Commonly known as: Diovan  Take 1 tablet by mouth daily               Where to Get Your Medications        These medications were sent to [facility] [id number] [address] [phone] - [phone]  [address] [place] [address]      Phone: [phone]   potassium chloride 20 MEQ extended release tablet         Consultants:   infectious disease    Hospital Course:   Fany Olmedo is a 69 y.o. female admitted with  possible urinary tract

## 2024-08-30 NOTE — PROGRESS NOTES
Writer entered patient's room and pt notified writer that she has had 4 or 5 episodes in the last few days of SOB and feels pressure on her sternum. This was the first she has notified anyone about it.  She states that when it happens, she has to take short, shallow breaths and it eventually passes. Writer spoke to BHAVESH Jackson. She ordered an EKG which showed sinus leyla and added on a troponin to her morning labs which is still pending. Renetta will speak with attending provider this am.

## 2024-08-30 NOTE — PROGRESS NOTES
Discharge instructions reviewed with patient. IV and telemetry have been removed. Will transport home via private vehicle with son

## 2024-08-30 NOTE — DISCHARGE INSTRUCTIONS
Plenty of fluids.      Potassium supplement     No clear indication for antibiotics     Cancel appointment with Urology and Infectious Disease     Follow-up in roughly 1 week.      Call the office if you have any issues.

## 2024-08-30 NOTE — VIRTUAL HEALTH
Fany Olmedo, was evaluated through a synchronous (real-time) audio-video encounter. The patient (and/or guardian if applicable) is aware that this is a billable service, which includes applicable co-pays. This virtual visit was conducted with patient's (and/or legal guardian's) consent. Patient identification was verified, and a caregiver was present when appropriate.  The patient was located at Facility (Appt Department): [facility] MED SURG  [address] [facility] [address]  Loc: [phone]  The provider was located at Facility (Login Dept): [facility]  [address] [facility] [id number] [phone]  Confirm you are appropriately licensed, registered, or certified to deliver care in the [facility] where the patient is located as indicated above. If you are not or unsure, please re-schedule the visit: Yes, I confirm.   Consults     Progress Note   Total time spent on this encounter: Not billed by time    --Missael Wolf MD on 8/30/2024 at 7:02 AM    An electronic signature was used to authenticate this note.      [facility] - Progress Note    Today's Date and Time: 8/30/2024, 7:02 AM    Impression :   Urinary tract infection  History of prior E. coli ESBL complicated urinary tract infection  Concern for sepsis  Blood cultures: No growth   Osteoarthritis  Polyarthralgia  Prior worsening of symptoms after treatment with ciprofloxacin and Levaquin    Recommendations:   Discontinue meropenem  Blood and urine cultures: No growth   Avoid administration of any quinolone antibiotics in the future.   Polyarthralgia can be a potential side effect of quinolone antibiotics    Medical Decision Making/Summary/Discussion:8/30/2024     Daily Elements of Decision Making provided by Consulting Physician:    Note: I have independently performed the steps listed below as part of the medical decision making and evaluation.    Review of current  antibiotics     Coordination of Outpatient Care:   Estimated Length of IV antimicrobials:TBD  Patient will need Midline Catheter Insertion: TBD  Patient will need PICC line Insertion:TBD  Patient will need: Home IV , Infusion Center,  SNF,  LTAC: TBD  Patient will need outpatient wound care: No    Chief complaint/reason for consultation:   Complicated urinary tract infection  Concern for sepsis      History of Present Illness:   Fany Olmedo is a 69 y.o.-year-old  female who was initially admitted on 8/27/2024. Patient seen at the request of Dr. Cervantes.    INITIAL HISTORY:    Patient has a past medical history that includes:  Essential hypertension  Hyperlipidemia  Obstructive sleep apnea on CPAP  Depression  Osteoarthritis    Patient was admitted to the hospital on 8/27/2024 after being seen in her primary care physician's office.  At that point the patient was exhibiting signs of urinary tract inflammation and there was concern for possible associated sepsis with an underlying complicated urinary tract infection.    The patient indicated that she had previously experienced an E. coli ESBL urinary tract infection.    The patient also was complaining of polyarthralgia which was affecting her knees, hips, fingers, elbows and shoulders.  She indicated that her joint symptoms have previously exacerbated when treated with ciprofloxacin and also with Levaquin.    ID service asked to evaluate and advice on treatment.     CURRENT EVALUATION- DAILY INTERVAL CHANGES 8/30/2024  BP (!) 177/89   Pulse 57   Temp (!) 96.5 °F (35.8 °C) (Temporal)   Resp 16   Ht 1.549 m (5' 1\")   Wt 74.1 kg (163 lb 5.8 oz)   LMP  (LMP Unknown)   SpO2 97%   BMI 30.87 kg/m²     Afebrile  VS stable    Patient on room air  Respiratory rate 16-->14  Oxygen saturation 98-->94  Reported some SOB    Cultures: No growth   WBC normal  CRP improved    Medications reviewed:  Meropenem discontinued    Labs, X rays reviewed: 8/30/2024 with  Oral QAM    hydroCHLOROthiazide  50 mg Oral Daily    pantoprazole  40 mg Oral QAM AC    sodium chloride flush  5-40 mL IntraVENous 2 times per day    enoxaparin  40 mg SubCUTAneous Daily    meropenem  1,000 mg IntraVENous Q8H       Social History:     Social History     Socioeconomic History    Marital status:      Spouse name: Not on file    Number of children: Not on file    Years of education: Not on file    Highest education level: Not on file   Occupational History    Not on file   Tobacco Use    Smoking status: Former     Current packs/day: 0.00     Average packs/day: 1 pack/day for 6.0 years (6.0 ttl pk-yrs)     Types: Cigarettes     Start date:      Quit date:      Years since quittin.6    Smokeless tobacco: Never   Vaping Use    Vaping status: Never Used   Substance and Sexual Activity    Alcohol use: Not Currently     Comment: rarely    Drug use: No    Sexual activity: Yes     Partners: Male   Other Topics Concern    Not on file   Social History Narrative    Not on file     Social Determinants of Health     Financial Resource Strain: Low Risk  (11/10/2023)    Overall Financial Resource Strain (CARDIA)     Difficulty of Paying Living Expenses: Not hard at all   Food Insecurity: No Food Insecurity (2024)    Hunger Vital Sign     Worried About Running Out of Food in the Last Year: Never true     Ran Out of Food in the Last Year: Never true   Transportation Needs: No Transportation Needs (2024)    PRAPARE - Transportation     Lack of Transportation (Medical): No     Lack of Transportation (Non-Medical): No   Physical Activity: Inactive (11/10/2023)    Exercise Vital Sign     Days of Exercise per Week: 0 days     Minutes of Exercise per Session: 0 min   Stress: No Stress Concern Present (11/10/2023)    [employer] [place] [employer] - Occupational Stress Questionnaire     Feeling of Stress : Not at all   Social Connections: Moderately Integrated (11/10/2023)    Social  swelling or deformities.  Reported polyarthralgia  Hematologic: No bleeding or bruising.  Neurologic: No headache, weakness, numbness, or tingling.  Integument: No rash, no ulcers.  Psychiatric: No depression.   Endocrine: No polyuria, no polydipsia, no polyphagia.    Physical Examination :   Patient Vitals for the past 8 hrs:   BP Temp Temp src Pulse Resp SpO2 Height Weight   08/30/24 0645 (!) 177/89 (!) 96.5 °F (35.8 °C) Temporal 57 16 97 % -- --   08/30/24 0445 -- -- -- -- -- -- 1.549 m (5' 1\") 74.1 kg (163 lb 5.8 oz)       It is not possible to conduct an examination by Telehealth. Therefore, I did not examine the patient.    Information on physical exam employed in the evaluation of the patient is a composite of information provided by the patient, RN or physicians on site that is relevant to the medical problems being evaluated.      It is not possible to conduct a full examination by Telehealth.  Information below is composite of information provided by the patient, RN and physicians on site.     Medical Decision Making -Laboratory:   I have independently reviewed/ordered the following labs:    CBC with Differential:   Recent Labs     08/29/24  0554 08/30/24  0515   WBC 13.1* 5.3   HGB 10.5* 10.4*   HCT 30.0* 30.1*    162   LYMPHOPCT 16* 41   MONOPCT 5 6   EOSPCT 1 7*     BMP:   Recent Labs     08/27/24  1550 08/28/24  0510 08/29/24  0554 08/30/24  0515      < > 137 141   K 2.8*   < > 3.5* 3.9   CL 96*   < > 103 106   CO2 27   < > 26 26   BUN 13   < > 13 14   CREATININE 0.7   < > 0.7 0.7   MG 1.6  --  1.8  --     < > = values in this interval not displayed.     Hepatic Function Panel:   Recent Labs     08/27/24  1550   BILITOT 0.7   ALKPHOS 54   ALT 22   AST 19     No results for input(s): \"RPR\" in the last 72 hours.  No results for input(s): \"HIV\" in the last 72 hours.  No results for input(s): \"BC\" in the last 72 hours.  Lab Results   Component Value Date/Time    BACTERIA TRACE 08/27/2024 05:10

## 2024-08-30 NOTE — PROGRESS NOTES
Progress Note    SUBJECTIVE: No fever or chills.  Feels better.  Some shortness of breath.    OBJECTIVE:    Vitals:   TEMPERATURE:  Current - Temp: (!) 96.5 °F (35.8 °C); Max - Temp  Av.1 °F (36.2 °C)  Min: 96.5 °F (35.8 °C)  Max: 97.6 °F (36.4 °C)  RESPIRATIONS RANGE: Resp  Av  Min: 16  Max: 16  PULSE RANGE: Pulse  Av  Min: 57  Max: 57  BLOOD PRESSURE RANGE:  Systolic (24hrs), Av , Min:116 , Max:177   ; Diastolic (24hrs), Av, Min:62, Max:89    PULSE OXIMETRY RANGE: SpO2  Av.5 %  Min: 97 %  Max: 98 %  24HR INTAKE/OUTPUT:    Intake/Output Summary (Last 24 hours) at 2024 0743  Last data filed at 2024 0535  Gross per 24 hour   Intake 2288.04 ml   Output 1000 ml   Net 1288.04 ml     -----------------------------------------------------------------  Exam:  General: A & O x3 and alert  HEENT: Supple neck & negative  Heart: Regular  Lungs: Clear lung sounds just a few crackles in the right base.  Abdomen: Normal & soft, No tenderness and BS normal  Extremities:  No edema   Neuro: NonFocal     -----------------------------------------------------------------  Diagnostic Data:  Lab Results   Component Value Date    WBC 5.3 2024    HGB 10.4 (L) 2024     2024       Lab Results   Component Value Date    BUN 14 2024    CREATININE 0.7 2024     2024    K 3.9 2024    CALCIUM 8.2 (L) 2024     2024    CO2 26 2024    LABGLOM >90 2024       Lab Results   Component Value Date    WBCUA None 2024    RBCUA 0 TO 2 2024    LEUKOCYTESUR NEGATIVE 2024    GLUCOSEU NEGATIVE 2024    KETUA NEGATIVE 2024    PROTEINU TRACE (A) 2024    HGBUR 1+ (A) 2024    CASTUA  2024     None Reference range defined for non-centrifuged specimen.    BACTERIA TRACE (A) 2024    YEAST MODERATE (A) 2024       No results found for: \"MYOGLOBIN\", \"TROPONINT\", \"CKTOTAL\", \"CKMB\", \"PROBNP\"    CT  ABDOMEN PELVIS WO CONTRAST Additional Contrast? None    Result Date: 8/27/2024  EXAMINATION: CT OF THE ABDOMEN AND PELVIS WITHOUT CONTRAST 8/27/2024 5:25 pm TECHNIQUE: CT of the abdomen and pelvis was performed without the administration of intravenous contrast. Multiplanar reformatted images are provided for review. Automated exposure control, iterative reconstruction, and/or weight based adjustment of the mA/kV was utilized to reduce the radiation dose to as low as reasonably achievable. COMPARISON: None. HISTORY: ORDERING SYSTEM PROVIDED HISTORY: abd pain / ? stones TECHNOLOGIST PROVIDED HISTORY: abd pain / ? stones FINDINGS: Liver: Normal Gallbladder: Normal Biliary System: Non-dilated. Pancreas: Normal. Spleen: Normal. Adrenals: Normal. Kidneys: Normal symmetric enhancement. No nephroliths. Ureters: Normal in course and caliber. Bladder: Normal. Pelvis: Normal. Stomach: Normal. Duodenum: Normal. Small Bowel: Normal. Colon: Normal. Appendix: The appendix is surgically absent. Lymph Nodes: No lymphadenopathy. Peritoneum: No ascites or free air. No fluid collection. Retroperitoneum: Normal. Vessels: Normal caliber vessels. Abdominal Wall: Normal. Bones: No acute osseous findings. Lung Bases: Solid 5 mm subpleural nodule within the lateral segment right middle lobe.  Small right-sided pleural effusion.  Subsegmental atelectasis of the bilateral lower lobes.. Inferior Mediastinum: Normal.     1. No acute intra-abdominal or pelvic process. 2. Small right-sided pleural effusion. 3. Solid 5 mm subpleural nodule within the lateral segment right middle lobe. Consider 12 month follow-up if the patient has risk factors for lung cancer.     XR CHEST (2 VW)    Result Date: 8/27/2024  EXAMINATION: TWO XRAY VIEWS OF THE CHEST 8/27/2024 5:28 pm COMPARISON: CT abdomen and pelvis 08/27/2024 HISTORY: ORDERING SYSTEM PROVIDED HISTORY: ill TECHNOLOGIST PROVIDED HISTORY: ill FINDINGS: Lungs: Clear. Pleura: No effusion or  pneumothorax. Cardiomediastinal silhouette: Normal contours. Bones: No acute osseous findings. Soft tissues: Normal.     No acute cardiopulmonary process.       ASSESSMENT:    Principal Problem:    Complicated UTI (urinary tract infection) /history of ESBL  Active Problems:    Essential hypertension    Polyarthralgia from quinolones    Fluid overload from IV fluids  Resolved Problems:    * No resolved hospital problems. *      Patient Active Problem List    Diagnosis Date Noted    Fluid overload from IV fluids 08/30/2024    Complicated UTI (urinary tract infection) /history of ESBL 08/27/2024    Polyarthralgia from quinolones 08/27/2024    Major depressive disorder, recurrent, mild 05/11/2023    Acute COVID-19 12/20/2021    Essential hypertension     Hyperlipidemia     Acute reaction to stress     Rosacea        PLAN:  Continue IV antibiotics for ESBL.  Infectious disease involved  Lasix IV x 1  Critical Care Time: 0  Hydroxyzine for sleep  Lock IV.  Probably discharge home today.  Cultures are negative.      [facility] Advanced Care Planning documentation:  [x] I have confirmed that the patient's Advance Care Plan is present, Code Status is documented, or surrogate decision maker is listed in the patient's medical record  [If \"yes\", STOP HERE]     [] The patient's Advance Care Plan is NOT present because:    []  I confirmed today that the patient does not wish or was not able to name a   surrogate decision maker or provide and advance care plan.    [] Hospice care is currently being provided or has been provided within the   calendar year.    []  I did NOT confirm today the presence of an Advance Care Plan or surrogate   decision maker documented within the patient's medical record.   [DOES NOT SATISFY MIPS PERFORMANCE]    Zoran Cervantes MD , [facility].

## 2024-08-30 NOTE — PROGRESS NOTES
Assessment and vitals obtained at this time as charted. Pt is A&O x4 and denies pain. Pt is resting in bed watching TV and denies any further needs at this time. Call light within reach, care ongoing.

## 2024-08-30 NOTE — CARE COORDINATION
IMM letter provided to patient.  Patient offered four hours to make informed decision regarding appeal process; patient agreeable to discharge.       08/30/24 0925   IMM Letter   IMM Letter given to Patient/Family/Significant other/Guardian/POA/by: second- patient/ DANIELLA RUSSELL   IMM Letter date given: 08/30/24   IMM Letter time given: 0832     Patient is discharge to home today with no services.  YVONNE Henderson

## 2024-09-01 LAB
MICROORGANISM SPEC CULT: NORMAL
MICROORGANISM SPEC CULT: NORMAL
SERVICE CMNT-IMP: NORMAL
SERVICE CMNT-IMP: NORMAL
SPECIMEN DESCRIPTION: NORMAL
SPECIMEN DESCRIPTION: NORMAL

## 2024-09-04 LAB — SEND OUT REPORT: NORMAL

## 2024-09-16 ENCOUNTER — HOSPITAL ENCOUNTER (OUTPATIENT)
Age: 69
Setting detail: SPECIMEN
Discharge: HOME | End: 2024-09-16

## 2024-09-16 LAB
ALBUMIN SERPL-MCNC: 4.4 G/DL (ref 3.5–5.2)
ALBUMIN/GLOB SERPL: 2 {RATIO} (ref 1–2.5)
ALP SERPL-CCNC: 51 U/L (ref 35–104)
ALT SERPL-CCNC: 34 U/L (ref 10–35)
ANION GAP SERPL CALCULATED.3IONS-SCNC: 11 MMOL/L (ref 9–16)
AST SERPL-CCNC: 18 U/L (ref 10–35)
BILIRUB SERPL-MCNC: 0.4 MG/DL (ref 0–1.2)
BUN SERPL-MCNC: 15 MG/DL (ref 8–23)
CALCIUM SERPL-MCNC: 9.4 MG/DL (ref 8.6–10.4)
CHLORIDE SERPL-SCNC: 102 MMOL/L (ref 98–107)
CO2 SERPL-SCNC: 29 MMOL/L (ref 20–31)
CREAT SERPL-MCNC: 0.9 MG/DL (ref 0.5–0.9)
ERYTHROCYTE [DISTWIDTH] IN BLOOD BY AUTOMATED COUNT: 13.1 % (ref 11.8–14.4)
GFR, ESTIMATED: 71 ML/MIN/1.73M2
GLUCOSE SERPL-MCNC: 112 MG/DL (ref 74–99)
HCT VFR BLD AUTO: 41.2 % (ref 36.3–47.1)
HGB BLD-MCNC: 13.3 G/DL (ref 11.9–15.1)
MCH RBC QN AUTO: 29.3 PG (ref 25.2–33.5)
MCHC RBC AUTO-ENTMCNC: 32.3 G/DL (ref 28.4–34.8)
MCV RBC AUTO: 90.7 FL (ref 82.6–102.9)
NRBC BLD-RTO: 0 PER 100 WBC
PLATELET # BLD AUTO: 316 K/UL (ref 138–453)
PMV BLD AUTO: 10.3 FL (ref 8.1–13.5)
POTASSIUM SERPL-SCNC: 4 MMOL/L (ref 3.7–5.3)
PROT SERPL-MCNC: 6.7 G/DL (ref 6.6–8.7)
RBC # BLD AUTO: 4.54 M/UL (ref 3.95–5.11)
SODIUM SERPL-SCNC: 142 MMOL/L (ref 136–145)
WBC OTHER # BLD: 7.8 K/UL (ref 3.5–11.3)

## 2024-09-16 NOTE — PROGRESS NOTES
Physician Progress Note      PATIENT:               MARY GRACE BARRETT  [facility] #:                  [id number]  :                       1955  ADMIT DATE:       2024 3:01 PM  DISCH DATE:        2024 11:08 AM  RESPONDING  PROVIDER #:        Zoran Cervantes MD          QUERY TEXT:    Patient admitted  27 (discharged 24) with UTI.  Urine culture subsequently returned no significant growth.    In order to support the diagnosis of UTI, please include additional clinical   indicators in your documentation.  Or please document if the diagnosis of UTI   has been ruled out after further study.    The medical record reflects the following:  Risk Factors: per H/P hx UTI on antibiotics most recent in July on Levaquin,   had E coli with ESBL  Clinical Indicators: admission from office with concern for sepsis d/t UTI;    Per H/P negative for dysuria or hematuria;  admission UA: trace bacteria,   negative nitrite, negative leukocyte esterase; urine culture no significant   growth:  per Discharge Summary: \"admitted with  possible urinary tract   infection and myalgias and arthralgias probably from Levaquin.  Patient was   treated with IV antibiotics all cultures were negative.  Had improvement in   symptoms was given 1 dose of steroids well in the hospital.\"  Treatment: meropenem    Princess Krause, MSN, RN, CCDS, CRCR  Clinical Documentation Specialis  .  Options provided:  -- UTI present as evidenced by, Please document evidence.  -- UTI ruled out  -- Other - I will add my own diagnosis  -- Disagree - Not applicable / Not valid  -- Disagree - Clinically unable to determine / Unknown  -- Refer to Clinical Documentation Reviewer    PROVIDER RESPONSE TEXT:    UTI present as evidenced by dysuria along with recent urinary track of her   infection. Treated with antibiotics but probably ruled out final discharge.    Query created by: Princess Krause on 2024 4:41 PM      Electronically signed by:  Zoran Cervantes

## 2024-09-18 ENCOUNTER — HOSPITAL ENCOUNTER (OUTPATIENT)
Age: 69
Setting detail: SPECIMEN
Discharge: HOME | End: 2024-09-18

## 2024-09-18 DIAGNOSIS — A41.51 SEPSIS DUE TO ESCHERICHIA COLI, UNSPECIFIED WHETHER ACUTE ORGAN DYSFUNCTION PRESENT (HCC): ICD-10-CM

## 2024-09-19 LAB
BACTERIA URNS QL MICRO: NORMAL
BILIRUB UR QL STRIP: NEGATIVE
CASTS #/AREA URNS LPF: NORMAL /LPF (ref 0–8)
CLARITY UR: CLEAR
COLOR UR: YELLOW
EPI CELLS #/AREA URNS HPF: NORMAL /HPF (ref 0–5)
GLUCOSE UR STRIP-MCNC: NEGATIVE MG/DL
HGB UR QL STRIP.AUTO: NEGATIVE
KETONES UR STRIP-MCNC: NEGATIVE MG/DL
LEUKOCYTE ESTERASE UR QL STRIP: NEGATIVE
MICROORGANISM SPEC CULT: NORMAL
NITRITE UR QL STRIP: NEGATIVE
PH UR STRIP: 6.5 [PH] (ref 5–8)
PROT UR STRIP-MCNC: NEGATIVE MG/DL
RBC #/AREA URNS HPF: NORMAL /HPF (ref 0–4)
SP GR UR STRIP: 1.01 (ref 1–1.03)
SPECIMEN DESCRIPTION: NORMAL
UROBILINOGEN UR STRIP-ACNC: NORMAL EU/DL (ref 0–1)
WBC #/AREA URNS HPF: NORMAL /HPF (ref 0–5)

## 2024-11-08 ENCOUNTER — HOSPITAL ENCOUNTER (OUTPATIENT)
Age: 69
Setting detail: SPECIMEN
Discharge: HOME | End: 2024-11-08
Payer: MEDICARE

## 2024-11-08 DIAGNOSIS — R39.11 URINARY HESITANCY: ICD-10-CM

## 2024-11-08 LAB
EPI CELLS #/AREA URNS HPF: NORMAL /HPF (ref 0–25)
RBC #/AREA URNS HPF: NORMAL /HPF (ref 0–2)
WBC #/AREA URNS HPF: NORMAL /HPF (ref 0–5)

## 2024-11-08 PROCEDURE — 87086 URINE CULTURE/COLONY COUNT: CPT

## 2024-11-08 PROCEDURE — 81015 MICROSCOPIC EXAM OF URINE: CPT

## 2024-11-09 LAB
MICROORGANISM SPEC CULT: NORMAL
SERVICE CMNT-IMP: NORMAL
SPECIMEN DESCRIPTION: NORMAL

## 2024-11-14 ENCOUNTER — APPOINTMENT (OUTPATIENT)
Dept: CT IMAGING | Age: 69
End: 2024-11-14
Attending: INTERNAL MEDICINE
Payer: MEDICARE

## 2024-11-14 ENCOUNTER — HOSPITAL ENCOUNTER (INPATIENT)
Age: 69
LOS: 2 days | Discharge: HOME | End: 2024-11-16
Attending: INTERNAL MEDICINE | Admitting: INTERNAL MEDICINE
Payer: MEDICARE

## 2024-11-14 ENCOUNTER — APPOINTMENT (OUTPATIENT)
Age: 69
End: 2024-11-14
Attending: INTERNAL MEDICINE
Payer: MEDICARE

## 2024-11-14 ENCOUNTER — APPOINTMENT (OUTPATIENT)
Dept: GENERAL RADIOLOGY | Age: 69
End: 2024-11-14
Attending: INTERNAL MEDICINE
Payer: MEDICARE

## 2024-11-14 DIAGNOSIS — R06.02 SHORTNESS OF BREATH: Primary | ICD-10-CM

## 2024-11-14 LAB
ALBUMIN SERPL-MCNC: 3.8 G/DL (ref 3.5–5.2)
ALBUMIN/GLOB SERPL: 1.6 {RATIO} (ref 1–2.5)
ALP SERPL-CCNC: 72 U/L (ref 35–104)
ALT SERPL-CCNC: 204 U/L (ref 10–35)
ANION GAP SERPL CALCULATED.3IONS-SCNC: 9 MMOL/L (ref 9–16)
AST SERPL-CCNC: 147 U/L (ref 10–35)
B PARAP IS1001 DNA NPH QL NAA+NON-PROBE: NOT DETECTED
B PERT DNA SPEC QL NAA+PROBE: NOT DETECTED
BASOPHILS # BLD: 0 K/UL (ref 0–0.2)
BASOPHILS NFR BLD: 0 % (ref 0–2)
BILIRUB SERPL-MCNC: 0.6 MG/DL (ref 0–1.2)
BILIRUB UR QL STRIP: NEGATIVE
BNP SERPL-MCNC: 165 PG/ML (ref 0–125)
BUN SERPL-MCNC: 11 MG/DL (ref 8–23)
BUN/CREAT SERPL: 16 (ref 9–20)
C PNEUM DNA NPH QL NAA+NON-PROBE: NOT DETECTED
CALCIUM SERPL-MCNC: 9 MG/DL (ref 8.6–10.4)
CHLORIDE SERPL-SCNC: 99 MMOL/L (ref 98–107)
CLARITY UR: CLEAR
CO2 SERPL-SCNC: 28 MMOL/L (ref 20–31)
COLOR UR: YELLOW
CREAT SERPL-MCNC: 0.7 MG/DL (ref 0.5–0.9)
CRP SERPL HS-MCNC: 57.9 MG/L (ref 0–5)
ECHO AO SINUS VALSALVA DIAM: 2.6 CM
ECHO AO SINUS VALSALVA INDEX: 1.55 CM/M2
ECHO AO ST JNCT DIAM: 2.4 CM
ECHO AV CUSP MM: 1.8 CM
ECHO AV MEAN GRADIENT: 5 MMHG
ECHO AV MEAN VELOCITY: 1 M/S
ECHO AV PEAK GRADIENT: 9 MMHG
ECHO AV PEAK VELOCITY: 1.5 M/S
ECHO AV VELOCITY RATIO: 0.73
ECHO AV VTI: 31.8 CM
ECHO BSA: 1.73 M2
ECHO EST RA PRESSURE: 3 MMHG
ECHO LA AREA 2C: 15.3 CM2
ECHO LA AREA 4C: 15.5 CM2
ECHO LA MAJOR AXIS: 5.3 CM
ECHO LA MINOR AXIS: 5 CM
ECHO LA VOL BP: 39 ML (ref 22–52)
ECHO LA VOL MOD A2C: 39 ML (ref 22–52)
ECHO LA VOL MOD A4C: 38 ML (ref 22–52)
ECHO LA VOL/BSA BIPLANE: 23 ML/M2 (ref 16–34)
ECHO LA VOLUME INDEX MOD A2C: 23 ML/M2 (ref 16–34)
ECHO LA VOLUME INDEX MOD A4C: 23 ML/M2 (ref 16–34)
ECHO LV E' LATERAL VELOCITY: 11.6 CM/S
ECHO LV EDV A2C: 49 ML
ECHO LV EDV A4C: 51 ML
ECHO LV EDV INDEX A4C: 30 ML/M2
ECHO LV EDV NDEX A2C: 29 ML/M2
ECHO LV EJECTION FRACTION A2C: 68 %
ECHO LV EJECTION FRACTION A4C: 68 %
ECHO LV EJECTION FRACTION BIPLANE: 68 % (ref 55–100)
ECHO LV ESV A2C: 16 ML
ECHO LV ESV A4C: 17 ML
ECHO LV ESV INDEX A2C: 10 ML/M2
ECHO LV ESV INDEX A4C: 10 ML/M2
ECHO LV FRACTIONAL SHORTENING: 36 % (ref 28–44)
ECHO LV INTERNAL DIMENSION DIASTOLE INDEX: 2.5 CM/M2
ECHO LV INTERNAL DIMENSION DIASTOLIC: 4.2 CM (ref 3.9–5.3)
ECHO LV INTERNAL DIMENSION SYSTOLIC INDEX: 1.61 CM/M2
ECHO LV INTERNAL DIMENSION SYSTOLIC: 2.7 CM
ECHO LV IVSD: 0.8 CM (ref 0.6–0.9)
ECHO LV MASS 2D: 109.8 G (ref 67–162)
ECHO LV MASS INDEX 2D: 65.4 G/M2 (ref 43–95)
ECHO LV POSTERIOR WALL DIASTOLIC: 0.9 CM (ref 0.6–0.9)
ECHO LV RELATIVE WALL THICKNESS RATIO: 0.43
ECHO LVOT AV VTI INDEX: 0.79
ECHO LVOT MEAN GRADIENT: 3 MMHG
ECHO LVOT PEAK GRADIENT: 5 MMHG
ECHO LVOT PEAK VELOCITY: 1.1 M/S
ECHO LVOT VTI: 25.2 CM
ECHO MV A VELOCITY: 0.89 M/S
ECHO MV E DECELERATION TIME (DT): 235 MS
ECHO MV E VELOCITY: 0.85 M/S
ECHO MV E/A RATIO: 0.96
ECHO MV E/E' LATERAL: 7.33
ECHO PV MAX VELOCITY: 1 M/S
ECHO PV PEAK GRADIENT: 4 MMHG
ECHO RIGHT VENTRICULAR SYSTOLIC PRESSURE (RVSP): 11 MMHG
ECHO TV REGURGITANT MAX VELOCITY: 1.39 M/S
ECHO TV REGURGITANT PEAK GRADIENT: 8 MMHG
EKG ATRIAL RATE: 63 BPM
EKG P-R INTERVAL: 180 MS
EKG Q-T INTERVAL: 402 MS
EKG QRS DURATION: 82 MS
EKG QTC CALCULATION (BAZETT): 411 MS
EKG R AXIS: 43 DEGREES
EKG T AXIS: -8 DEGREES
EKG VENTRICULAR RATE: 63 BPM
EOSINOPHIL # BLD: 0.14 K/UL (ref 0–0.44)
EOSINOPHILS RELATIVE PERCENT: 3 % (ref 1–4)
EPI CELLS #/AREA URNS HPF: NORMAL /HPF (ref 0–25)
ERYTHROCYTE [DISTWIDTH] IN BLOOD BY AUTOMATED COUNT: 13.4 % (ref 11.8–14.4)
ERYTHROCYTE [SEDIMENTATION RATE] IN BLOOD BY PHOTOMETRIC METHOD: 21 MM/HR (ref 0–30)
FERRITIN SERPL-MCNC: 311 NG/ML
FLUAV AG SPEC QL: NEGATIVE
FLUAV RNA NPH QL NAA+NON-PROBE: NOT DETECTED
FLUBV AG SPEC QL: NEGATIVE
FLUBV RNA NPH QL NAA+NON-PROBE: NOT DETECTED
GFR, ESTIMATED: >90 ML/MIN/1.73M2
GLUCOSE SERPL-MCNC: 106 MG/DL (ref 74–99)
GLUCOSE UR STRIP-MCNC: NEGATIVE MG/DL
HADV DNA NPH QL NAA+NON-PROBE: NOT DETECTED
HCOV 229E RNA NPH QL NAA+NON-PROBE: NOT DETECTED
HCOV HKU1 RNA NPH QL NAA+NON-PROBE: NOT DETECTED
HCOV NL63 RNA NPH QL NAA+NON-PROBE: NOT DETECTED
HCOV OC43 RNA NPH QL NAA+NON-PROBE: NOT DETECTED
HCT VFR BLD AUTO: 34.1 % (ref 36.3–47.1)
HETEROPH AB BLD QL IA: NEGATIVE
HGB BLD-MCNC: 12.2 G/DL (ref 11.9–15.1)
HGB UR QL STRIP.AUTO: NEGATIVE
HMPV RNA NPH QL NAA+NON-PROBE: NOT DETECTED
HPIV1 RNA NPH QL NAA+NON-PROBE: NOT DETECTED
HPIV2 RNA NPH QL NAA+NON-PROBE: NOT DETECTED
HPIV3 RNA NPH QL NAA+NON-PROBE: NOT DETECTED
HPIV4 RNA NPH QL NAA+NON-PROBE: NOT DETECTED
IMM GRANULOCYTES # BLD AUTO: 0 K/UL (ref 0–0.3)
IMM GRANULOCYTES NFR BLD: 0 %
INR PPP: 1.1
IRON SATN MFR SERPL: 12 % (ref 20–55)
IRON SERPL-MCNC: 31 UG/DL (ref 37–145)
KETONES UR STRIP-MCNC: NEGATIVE MG/DL
LEUKOCYTE ESTERASE UR QL STRIP: NEGATIVE
LYMPHOCYTES NFR BLD: 0.41 K/UL (ref 1.1–3.7)
LYMPHOCYTES RELATIVE PERCENT: 9 % (ref 24–43)
M PNEUMO DNA NPH QL NAA+NON-PROBE: NOT DETECTED
MAGNESIUM SERPL-MCNC: 1.7 MG/DL (ref 1.6–2.4)
MCH RBC QN AUTO: 30.8 PG (ref 25.2–33.5)
MCHC RBC AUTO-ENTMCNC: 35.8 G/DL (ref 28.4–34.8)
MCV RBC AUTO: 86.1 FL (ref 82.6–102.9)
MONOCYTES NFR BLD: 0.41 K/UL (ref 0.1–1.2)
MONOCYTES NFR BLD: 9 % (ref 3–12)
MORPHOLOGY: NORMAL
NEUTROPHILS NFR BLD: 79 % (ref 36–65)
NEUTS SEG NFR BLD: 3.64 K/UL (ref 1.5–8.1)
NITRITE UR QL STRIP: NEGATIVE
NRBC BLD-RTO: 0 PER 100 WBC
PARTIAL THROMBOPLASTIN TIME: 25.4 SEC (ref 26.8–34.8)
PH UR STRIP: 7 [PH] (ref 5–9)
PLATELET # BLD AUTO: 179 K/UL (ref 138–453)
PMV BLD AUTO: 10.6 FL (ref 8.1–13.5)
POTASSIUM SERPL-SCNC: 3.5 MMOL/L (ref 3.7–5.3)
PROT SERPL-MCNC: 6.2 G/DL (ref 6.6–8.7)
PROT UR STRIP-MCNC: NEGATIVE MG/DL
PROTHROMBIN TIME: 14 SEC (ref 11.7–14.1)
RBC # BLD AUTO: 3.96 M/UL (ref 3.95–5.11)
RBC #/AREA URNS HPF: NORMAL /HPF (ref 0–2)
RSV RNA NPH QL NAA+NON-PROBE: NOT DETECTED
RV+EV RNA NPH QL NAA+NON-PROBE: NOT DETECTED
SARS-COV-2 RDRP RESP QL NAA+PROBE: NOT DETECTED
SARS-COV-2 RNA NPH QL NAA+NON-PROBE: NOT DETECTED
SODIUM SERPL-SCNC: 136 MMOL/L (ref 136–145)
SP GR UR STRIP: <1.005 (ref 1.01–1.02)
SPECIMEN DESCRIPTION: NORMAL
SPECIMEN DESCRIPTION: NORMAL
TIBC SERPL-MCNC: 257 UG/DL (ref 250–450)
TROPONIN I SERPL HS-MCNC: 6 NG/L (ref 0–14)
TROPONIN I SERPL HS-MCNC: 7 NG/L (ref 0–14)
TSH SERPL DL<=0.05 MIU/L-ACNC: 2.18 UIU/ML (ref 0.27–4.2)
UNSATURATED IRON BINDING CAPACITY: 226 UG/DL (ref 112–347)
UROBILINOGEN UR STRIP-ACNC: NORMAL EU/DL (ref 0–1)
WBC #/AREA URNS HPF: NORMAL /HPF (ref 0–5)
WBC OTHER # BLD: 4.6 K/UL (ref 3.5–11.3)

## 2024-11-14 PROCEDURE — 86140 C-REACTIVE PROTEIN: CPT

## 2024-11-14 PROCEDURE — 93306 TTE W/DOPPLER COMPLETE: CPT

## 2024-11-14 PROCEDURE — 93005 ELECTROCARDIOGRAM TRACING: CPT | Performed by: NURSE PRACTITIONER

## 2024-11-14 PROCEDURE — 82728 ASSAY OF FERRITIN: CPT

## 2024-11-14 PROCEDURE — 93010 ELECTROCARDIOGRAM REPORT: CPT | Performed by: INTERNAL MEDICINE

## 2024-11-14 PROCEDURE — 6360000002 HC RX W HCPCS: Performed by: NURSE PRACTITIONER

## 2024-11-14 PROCEDURE — 71260 CT THORAX DX C+: CPT

## 2024-11-14 PROCEDURE — 6370000000 HC RX 637 (ALT 250 FOR IP): Performed by: NURSE PRACTITIONER

## 2024-11-14 PROCEDURE — 2580000003 HC RX 258: Performed by: NURSE PRACTITIONER

## 2024-11-14 PROCEDURE — 87086 URINE CULTURE/COLONY COUNT: CPT

## 2024-11-14 PROCEDURE — 94664 DEMO&/EVAL PT USE INHALER: CPT

## 2024-11-14 PROCEDURE — 85730 THROMBOPLASTIN TIME PARTIAL: CPT

## 2024-11-14 PROCEDURE — 71270 CT THORAX DX C-/C+: CPT

## 2024-11-14 PROCEDURE — 87635 SARS-COV-2 COVID-19 AMP PRB: CPT

## 2024-11-14 PROCEDURE — 84484 ASSAY OF TROPONIN QUANT: CPT

## 2024-11-14 PROCEDURE — 94761 N-INVAS EAR/PLS OXIMETRY MLT: CPT

## 2024-11-14 PROCEDURE — 86308 HETEROPHILE ANTIBODY SCREEN: CPT

## 2024-11-14 PROCEDURE — 80053 COMPREHEN METABOLIC PANEL: CPT

## 2024-11-14 PROCEDURE — 94669 MECHANICAL CHEST WALL OSCILL: CPT

## 2024-11-14 PROCEDURE — 94640 AIRWAY INHALATION TREATMENT: CPT

## 2024-11-14 PROCEDURE — 83550 IRON BINDING TEST: CPT

## 2024-11-14 PROCEDURE — 85652 RBC SED RATE AUTOMATED: CPT

## 2024-11-14 PROCEDURE — 83735 ASSAY OF MAGNESIUM: CPT

## 2024-11-14 PROCEDURE — 71046 X-RAY EXAM CHEST 2 VIEWS: CPT

## 2024-11-14 PROCEDURE — 36415 COLL VENOUS BLD VENIPUNCTURE: CPT

## 2024-11-14 PROCEDURE — 6360000004 HC RX CONTRAST MEDICATION: Performed by: INTERNAL MEDICINE

## 2024-11-14 PROCEDURE — 83540 ASSAY OF IRON: CPT

## 2024-11-14 PROCEDURE — 0202U NFCT DS 22 TRGT SARS-COV-2: CPT

## 2024-11-14 PROCEDURE — 85610 PROTHROMBIN TIME: CPT

## 2024-11-14 PROCEDURE — 81001 URINALYSIS AUTO W/SCOPE: CPT

## 2024-11-14 PROCEDURE — 85025 COMPLETE CBC W/AUTO DIFF WBC: CPT

## 2024-11-14 PROCEDURE — 1200000000 HC SEMI PRIVATE

## 2024-11-14 PROCEDURE — 87804 INFLUENZA ASSAY W/OPTIC: CPT

## 2024-11-14 PROCEDURE — 80074 ACUTE HEPATITIS PANEL: CPT

## 2024-11-14 PROCEDURE — 83880 ASSAY OF NATRIURETIC PEPTIDE: CPT

## 2024-11-14 PROCEDURE — 84443 ASSAY THYROID STIM HORMONE: CPT

## 2024-11-14 PROCEDURE — 93306 TTE W/DOPPLER COMPLETE: CPT | Performed by: INTERNAL MEDICINE

## 2024-11-14 RX ORDER — CITALOPRAM HYDROBROMIDE 20 MG/1
40 TABLET ORAL DAILY
Status: DISCONTINUED | OUTPATIENT
Start: 2024-11-14 | End: 2024-11-16 | Stop reason: HOSPADM

## 2024-11-14 RX ORDER — VALSARTAN 80 MG/1
160 TABLET ORAL DAILY
Status: DISCONTINUED | OUTPATIENT
Start: 2024-11-14 | End: 2024-11-16 | Stop reason: HOSPADM

## 2024-11-14 RX ORDER — IPRATROPIUM BROMIDE AND ALBUTEROL SULFATE 2.5; .5 MG/3ML; MG/3ML
1 SOLUTION RESPIRATORY (INHALATION)
Status: DISCONTINUED | OUTPATIENT
Start: 2024-11-14 | End: 2024-11-15

## 2024-11-14 RX ORDER — POLYETHYLENE GLYCOL 3350 17 G/17G
17 POWDER, FOR SOLUTION ORAL DAILY PRN
Status: DISCONTINUED | OUTPATIENT
Start: 2024-11-14 | End: 2024-11-16 | Stop reason: HOSPADM

## 2024-11-14 RX ORDER — POTASSIUM CHLORIDE 1500 MG/1
20 TABLET, EXTENDED RELEASE ORAL DAILY
Status: DISCONTINUED | OUTPATIENT
Start: 2024-11-14 | End: 2024-11-16 | Stop reason: HOSPADM

## 2024-11-14 RX ORDER — ENOXAPARIN SODIUM 100 MG/ML
40 INJECTION SUBCUTANEOUS DAILY
Status: DISCONTINUED | OUTPATIENT
Start: 2024-11-14 | End: 2024-11-16 | Stop reason: HOSPADM

## 2024-11-14 RX ORDER — SODIUM CHLORIDE 9 MG/ML
INJECTION, SOLUTION INTRAVENOUS PRN
Status: DISCONTINUED | OUTPATIENT
Start: 2024-11-14 | End: 2024-11-16 | Stop reason: HOSPADM

## 2024-11-14 RX ORDER — ONDANSETRON 2 MG/ML
4 INJECTION INTRAMUSCULAR; INTRAVENOUS EVERY 6 HOURS PRN
Status: DISCONTINUED | OUTPATIENT
Start: 2024-11-14 | End: 2024-11-16 | Stop reason: HOSPADM

## 2024-11-14 RX ORDER — ACETAMINOPHEN 650 MG/1
650 SUPPOSITORY RECTAL EVERY 6 HOURS PRN
Status: DISCONTINUED | OUTPATIENT
Start: 2024-11-14 | End: 2024-11-16 | Stop reason: HOSPADM

## 2024-11-14 RX ORDER — PANTOPRAZOLE SODIUM 40 MG/1
40 TABLET, DELAYED RELEASE ORAL
Status: DISCONTINUED | OUTPATIENT
Start: 2024-11-15 | End: 2024-11-16 | Stop reason: HOSPADM

## 2024-11-14 RX ORDER — ATENOLOL 25 MG/1
25 TABLET ORAL EVERY MORNING
Status: DISCONTINUED | OUTPATIENT
Start: 2024-11-14 | End: 2024-11-16 | Stop reason: HOSPADM

## 2024-11-14 RX ORDER — ROSUVASTATIN CALCIUM 40 MG/1
40 TABLET, COATED ORAL NIGHTLY
Status: DISCONTINUED | OUTPATIENT
Start: 2024-11-14 | End: 2024-11-16 | Stop reason: HOSPADM

## 2024-11-14 RX ORDER — SODIUM CHLORIDE 0.9 % (FLUSH) 0.9 %
10 SYRINGE (ML) INJECTION EVERY 12 HOURS SCHEDULED
Status: DISCONTINUED | OUTPATIENT
Start: 2024-11-14 | End: 2024-11-16 | Stop reason: HOSPADM

## 2024-11-14 RX ORDER — SODIUM CHLORIDE 0.9 % (FLUSH) 0.9 %
10 SYRINGE (ML) INJECTION PRN
Status: DISCONTINUED | OUTPATIENT
Start: 2024-11-14 | End: 2024-11-16 | Stop reason: HOSPADM

## 2024-11-14 RX ORDER — PROMETHAZINE HYDROCHLORIDE 25 MG/1
12.5 TABLET ORAL EVERY 6 HOURS PRN
Status: DISCONTINUED | OUTPATIENT
Start: 2024-11-14 | End: 2024-11-16 | Stop reason: HOSPADM

## 2024-11-14 RX ORDER — IOPAMIDOL 755 MG/ML
75 INJECTION, SOLUTION INTRAVASCULAR
Status: COMPLETED | OUTPATIENT
Start: 2024-11-14 | End: 2024-11-14

## 2024-11-14 RX ORDER — ACETAMINOPHEN 325 MG/1
650 TABLET ORAL EVERY 6 HOURS PRN
Status: DISCONTINUED | OUTPATIENT
Start: 2024-11-14 | End: 2024-11-16 | Stop reason: HOSPADM

## 2024-11-14 RX ADMIN — ENOXAPARIN SODIUM 40 MG: 100 INJECTION SUBCUTANEOUS at 12:01

## 2024-11-14 RX ADMIN — IPRATROPIUM BROMIDE AND ALBUTEROL SULFATE 1 DOSE: 2.5; .5 SOLUTION RESPIRATORY (INHALATION) at 20:18

## 2024-11-14 RX ADMIN — SODIUM CHLORIDE, PRESERVATIVE FREE 10 ML: 5 INJECTION INTRAVENOUS at 12:02

## 2024-11-14 RX ADMIN — ONDANSETRON 4 MG: 2 INJECTION, SOLUTION INTRAMUSCULAR; INTRAVENOUS at 10:43

## 2024-11-14 RX ADMIN — ACETAMINOPHEN 650 MG: 325 TABLET ORAL at 19:23

## 2024-11-14 RX ADMIN — IOPAMIDOL 75 ML: 755 INJECTION, SOLUTION INTRAVENOUS at 13:11

## 2024-11-14 RX ADMIN — SODIUM CHLORIDE, PRESERVATIVE FREE 10 ML: 5 INJECTION INTRAVENOUS at 20:31

## 2024-11-14 RX ADMIN — ACETAMINOPHEN 650 MG: 325 TABLET ORAL at 14:21

## 2024-11-14 RX ADMIN — IPRATROPIUM BROMIDE AND ALBUTEROL SULFATE 1 DOSE: 2.5; .5 SOLUTION RESPIRATORY (INHALATION) at 16:34

## 2024-11-14 ASSESSMENT — PAIN DESCRIPTION - PAIN TYPE: TYPE: ACUTE PAIN

## 2024-11-14 ASSESSMENT — PAIN SCALES - GENERAL
PAINLEVEL_OUTOF10: 7
PAINLEVEL_OUTOF10: 1
PAINLEVEL_OUTOF10: 3
PAINLEVEL_OUTOF10: 4

## 2024-11-14 ASSESSMENT — PAIN - FUNCTIONAL ASSESSMENT
PAIN_FUNCTIONAL_ASSESSMENT: ACTIVITIES ARE NOT PREVENTED

## 2024-11-14 ASSESSMENT — PAIN DESCRIPTION - DESCRIPTORS
DESCRIPTORS: ACHING
DESCRIPTORS: ACHING;POUNDING;PRESSURE
DESCRIPTORS: ACHING

## 2024-11-14 ASSESSMENT — PAIN DESCRIPTION - LOCATION
LOCATION: HEAD
LOCATION: HEAD
LOCATION: GENERALIZED

## 2024-11-14 ASSESSMENT — PAIN DESCRIPTION - ONSET
ONSET: ON-GOING

## 2024-11-14 ASSESSMENT — PAIN DESCRIPTION - FREQUENCY
FREQUENCY: CONTINUOUS

## 2024-11-14 NOTE — PROGRESS NOTES
Writer notified Lyric WARREN that patient was unsure if she took her medications this morning. Per Lyric WARREN, do not give them today

## 2024-11-14 NOTE — CARE COORDINATION
Case Management Assessment  Initial Evaluation    Date/Time of Evaluation: 11/14/2024 2:29 PM  Assessment Completed by: YVONNE Henderson    If patient is discharged prior to next notation, then this note serves as note for discharge by case management.    Patient Name: Fany Olmedo                   YOB: 1955  Diagnosis: SOB (shortness of breath) [R06.02]  Shortness of breath [R06.02]                   Date / Time: 11/14/2024  9:49 AM    Patient Admission Status: Inpatient   Readmission Risk (Low < 19, Mod (19-27), High > 27): Readmission Risk Score: 10.1    Current PCP: Samy Irvin APRN - CNP  PCP verified by CM? Yes    Chart Reviewed: Yes      History Provided by: Patient  Patient Orientation: Alert and Oriented, Person, Place, Situation, Self    Patient Cognition: Alert    Hospitalization in the last 30 days (Readmission):  No    If yes, Readmission Assessment in CM Navigator will be completed.    Advance Directives:      Code Status: Full Code   Patient's Primary Decision Maker is: Legal Next of Kin    Primary Decision Maker: ValentinajeffDamir - Child - [phone]    Secondary Decision Maker: NilayKedar dominguezian  Child - [phone]    Discharge Planning:    Patient lives with: Children, Family Members Type of Home: House  Primary Care Giver: Self  Patient Support Systems include: Children, Family Members   Current Financial resources: Medicare  Current community resources: None  Current services prior to admission: C-pap            Current DME:              Type of Home Care services:  None    ADLS  Prior functional level: Independent in ADLs/IADLs  Current functional level: Independent in ADLs/IADLs    PT AM-PAC:   /24  OT AM-PAC:   /24    Family can provide assistance at DC: Yes  Would you like Case Management to discuss the discharge plan with any other family members/significant others, and if so, who? No  Plans to Return to Present Housing: Yes  Other Identified Issues/Barriers

## 2024-11-14 NOTE — RT PROTOCOL NOTE
RESPIRATORY ASSESSMENT PROTOCOL                                                                                              Patient Name: Fany Olmedo Room#: 0318/0318-01 : 1955     Admitting diagnosis: SOB (shortness of breath) [R06.02]  Shortness of breath [R06.02]       Medical History:   Past Medical History:   Diagnosis Date    Arthritis 2024    physical [facility]    Depression     H/O total hysterectomy     History of colon polyps     History of partial surgical removal of colon     Hyperlipidemia     Hypertension     PCP    FABIEN on CPAP     cpap    Other and unspecified hyperlipidemia     Polyarthralgia 2024    Polyp of cecum     PONV (postoperative nausea and vomiting)     Unspecified acute reaction to stress     Unspecified essential hypertension        PATIENT ASSESSMENT    LABORATORY DATA  Hematology:   Lab Results   Component Value Date/Time    WBC 4.6 2024 10:15 AM    RBC 3.96 2024 10:15 AM    RBC 4.57 10/30/2023 08:50 AM    HGB 12.2 2024 10:15 AM    HCT 34.1 2024 10:15 AM     2024 10:15 AM     Chemistry:  No results found for: \"PHART\", \"LJZ1JYD\", \"PO2ART\", \"J4EMGCCU\", \"SRA7MHX\", \"PBEA\", \"NBEA\"    VITALS  Pulse: 71   Respirations: 16  BP: (!) 151/72  SpO2: 93 % O2 Device: None (Room air)  Temp: 97.3 °F (36.3 °C)    SKIN COLOR  [x] Normal  [] Pale  [] Dusky  [] Cyanotic    RESPIRATORY PATTERN  [x] Normal  [] Dyspnea  [] Cheyne-[place]  [] Kussmaul  [] Biots    AMBULATORY  [x] Yes  [] No  [] With Assistance    PEAK FLOW  Predicted:     Personal Best:            Patient Acuity 0 1 2 3 4 Score   Level of Consciousness (LOC) [x]  Alert & Oriented or Pt normal LOC []  Confused;follows directions []  Confused & uncooper-ative []  Obtunded []  Comatose 0   Respiratory Rate  (RR) []  Reg. rate & pattern. 12 - 20 bpm  []  Increased RR. Greater than 20 bpm   [x]  SOB w/ exertion or RR greater than 24 bpm []  Access- ory muscle use at  rest. Abn.  resp. []  SOB at rest.   2   Bilateral Breath Sounds (BBS) [x]  Clear []  Diminish-ed bases  []  Diminish-ed t/o, or rales   []  Sporadic, scattered wheezes or rhonchi []  Persistentwheezes and, or absent BBS 0   Cough [x]  Strong, effective, & non-prod. []  Effective & prod. Less than 25 ml (2 TBSP) over past 24 hrs []  Ineffective & non-prod to less than 25 ML over past 24 hrs []  Ineffective and, or greater than 25 ml sputum prod. past 24 hrs. []  Nonspon- taneous; Requires suctioning 0   Pulmonary History  (PULM HX) []  No smoking and no chronic pulmonary history []  Former smoker. Quit over 12 mos. ago []  Current smoker or quit w/ in 12 mos []  Pulm. History and, or 20 pk/yr smoking hx [x]  Admitted w/ acute pulm. dx and, or has been admitted w/ pulm. dx 2 or more times over past 12 mos 4   Surgical History this Admit  (SURG HX) [x]  No surgery []  General surgery []  Lower abdominal []  Thoracic or upper abdominal   []  Thoracic w/ pulm. disease 0   Chest X-Ray (CXR)/CT Scan []  Clear or not applicable []  Not available []  Atelectasis or pleural effusions []  Localized infiltrate or pulm. edema [x]  Con-solidated Infiltrates, bilateral, or in more than 1 lobe 4   TOTAL ACUITY: 10       CARE PLAN    If Acuity Level is 2, 3, or 4 in any of the following:    [] BILATERAL BREATH SOUNDS (BBS)     [x] PULMONARY HISTORY (PULM HX)  [x] Respiratory Rate  (RR)    Goal: Improve respiratory functions in patients with airway disease and decrease WOB    [x] AEROSOL PROTOCOL    Total Acuity:   14-28  []  Secondary Assessment in 24 hrs Total Acuity:  9-13  [x]  Secondary Assessment in 24 hrs Total Acuity:  4-8  []  Secondary Assessment in 24 hrs Total Acuity:  0-3  []  Secondary Assessment in 48 hrs   [facility] AEROSOL THERAPY with  [physician-ordered bronchodilator(s)] q 4 & Albuterol PRN q2 hrs.   Breath-Actuated Neb if BBS Acuity = 4, and pt. can use MP. Notify physician if condition deteriorates.  [facility] AEROSOL

## 2024-11-14 NOTE — PROGRESS NOTES
Patient arrived to room 318 via wheelchair. Patient placed in a gown. VS completed. Navigator completed. EKG completed. IV inserted and blood work drawn. Plan of care discussed with patient and her son. Resp swabs completed as well.

## 2024-11-14 NOTE — H&P
History and Physical    Patient:  Fany Olmedo  MRN: [id number]    Chief Complaint:  shortness of breath    History Obtained From:  patient, electronic medical record    PCP: Samy Irvin APRN - CNP    History of Present Illness:   The patient is a 69 y.o. female who presented as a direct admission with shortness of breath. Patient stated over the past several weeks her symptoms have been increasing. Patient complains of myalgias, arthralgias, chills but no fever, extreme fatigue, headache, nausea but no vomiting, shortness of breath, dizziness but no syncope. Patient denied any chest pain or palpitations. Patient was recently placed on Macrobid for UTI. She stated the headache and nausea began since the Macrobid was started. Patient denied any other medication changes. She denied ill contacts. Patient was hospitalized in August for UTI and at that time had probably arthralgia from quinolones. Patient did report some ankle swelling at the end of the day with sac rings at times.    Past Medical History:        Diagnosis Date    Arthritis 01/02/2024    physical therapy [facility]    Depression     H/O total hysterectomy     History of colon polyps     History of partial surgical removal of colon 2023    Hyperlipidemia     Hypertension     PCP    FABIEN on CPAP     cpap    Other and unspecified hyperlipidemia     Polyarthralgia 08/27/2024    Polyp of cecum     PONV (postoperative nausea and vomiting)     Unspecified acute reaction to stress     Unspecified essential hypertension        Past Surgical History:        Procedure Laterality Date    COLONOSCOPY      COLONOSCOPY  10/26/2021    Dr. Priest - polypectomy    COLONOSCOPY N/A 10/26/2021    COLONOSCOPY POLYPECTOMY REMOVAL HOT SNARE performed by Mario Priest MD at [facility] OR    COLONOSCOPY N/A 11/17/2023    colonoscopy performed by Radha Ortega DO at [facility] OR    COLONOSCOPY  01/03/2024    COLONOSCOPY, EMR    COLONOSCOPY N/A 01/03/2024     cannabinoid products and bitamin/mineral/dietary/nutritional supplements.  [If 'yes\", STOP HERE]     []  The patient is not eligible for medication reconciliation; the patient is in an emergent medical situation where delaying treatment would jeopardize the patient's health    []  I did NOT confirm, update or review the patient's current list of medications today.  [DOES NOT SATISFY [employer] PER[employer]MANCE]        [facility] Advanced Care Planning documentation:  [x] I have confirmed that the patient's Advance Care Plan is present, Code Status is documented, or surrogate decision maker is listed in the patient's medical record  [If \"yes\", STOP HERE]     [] The patient's Advance Care Plan is NOT present because:    []  I confirmed today that the patient does not wish or was not able to name a   surrogate decision maker or provide and advance care plan.    [] Hospice care is currently being provided or has been provided within the   calendar year.    []  I did NOT confirm today the presence of an Advance Care Plan or surrogate   decision maker documented within the patient's medical record.   [DOES NOT SATISFY [facility] PERFORMANCE]    CORE MEASURES  DVT prophylaxis: Lovenox  Decubitus ulcer present on admission: No  CODE STATUS: FULL CODE  Nutrition Status: good   Physical therapy: NA   Old Charts reviewed: Yes  EKG Reviewed: Yes  Advance Directive Addressed: Yes    JUAN Gray - CNP, JUAN, NP-C  11/14/2024, 12:45 PM

## 2024-11-15 LAB
ALBUMIN SERPL-MCNC: 3.6 G/DL (ref 3.5–5.2)
ALBUMIN/GLOB SERPL: 1.6 {RATIO} (ref 1–2.5)
ALP SERPL-CCNC: 82 U/L (ref 35–104)
ALT SERPL-CCNC: 223 U/L (ref 10–35)
ANION GAP SERPL CALCULATED.3IONS-SCNC: 8 MMOL/L (ref 9–16)
AST SERPL-CCNC: 106 U/L (ref 10–35)
BASOPHILS # BLD: <0.03 K/UL (ref 0–0.2)
BASOPHILS NFR BLD: 0 % (ref 0–2)
BILIRUB SERPL-MCNC: 0.5 MG/DL (ref 0–1.2)
BUN SERPL-MCNC: 7 MG/DL (ref 8–23)
BUN/CREAT SERPL: 10 (ref 9–20)
CALCIUM SERPL-MCNC: 8.7 MG/DL (ref 8.6–10.4)
CHLORIDE SERPL-SCNC: 101 MMOL/L (ref 98–107)
CHOLEST SERPL-MCNC: 126 MG/DL (ref 0–199)
CHOLESTEROL/HDL RATIO: 3.4
CO2 SERPL-SCNC: 30 MMOL/L (ref 20–31)
CREAT SERPL-MCNC: 0.7 MG/DL (ref 0.5–0.9)
CRP SERPL HS-MCNC: 49 MG/L (ref 0–5)
EOSINOPHIL # BLD: 0.22 K/UL (ref 0–0.44)
EOSINOPHILS RELATIVE PERCENT: 7 % (ref 1–4)
ERYTHROCYTE [DISTWIDTH] IN BLOOD BY AUTOMATED COUNT: 13.6 % (ref 11.8–14.4)
GFR, ESTIMATED: >90 ML/MIN/1.73M2
GLUCOSE SERPL-MCNC: 104 MG/DL (ref 74–99)
HAV IGM SERPL QL IA: NONREACTIVE
HBV CORE IGM SERPL QL IA: NONREACTIVE
HBV SURFACE AG SERPL QL IA: NONREACTIVE
HCT VFR BLD AUTO: 33.6 % (ref 36.3–47.1)
HCV AB SERPL QL IA: NONREACTIVE
HDLC SERPL-MCNC: 37 MG/DL
HGB BLD-MCNC: 11.4 G/DL (ref 11.9–15.1)
IMM GRANULOCYTES # BLD AUTO: <0.03 K/UL (ref 0–0.3)
IMM GRANULOCYTES NFR BLD: 0 %
LDLC SERPL CALC-MCNC: 66 MG/DL (ref 0–100)
LYMPHOCYTES NFR BLD: 0.54 K/UL (ref 1.1–3.7)
LYMPHOCYTES RELATIVE PERCENT: 16 % (ref 24–43)
MAGNESIUM SERPL-MCNC: 1.8 MG/DL (ref 1.6–2.4)
MCH RBC QN AUTO: 29.8 PG (ref 25.2–33.5)
MCHC RBC AUTO-ENTMCNC: 33.9 G/DL (ref 28.4–34.8)
MCV RBC AUTO: 87.7 FL (ref 82.6–102.9)
MICROORGANISM SPEC CULT: NO GROWTH
MONOCYTES NFR BLD: 0.34 K/UL (ref 0.1–1.2)
MONOCYTES NFR BLD: 10 % (ref 3–12)
NEUTROPHILS NFR BLD: 67 % (ref 36–65)
NEUTS SEG NFR BLD: 2.21 K/UL (ref 1.5–8.1)
NRBC BLD-RTO: 0 PER 100 WBC
PLATELET # BLD AUTO: 189 K/UL (ref 138–453)
PMV BLD AUTO: 10.3 FL (ref 8.1–13.5)
POTASSIUM SERPL-SCNC: 4.1 MMOL/L (ref 3.7–5.3)
PROT SERPL-MCNC: 5.9 G/DL (ref 6.6–8.7)
RBC # BLD AUTO: 3.83 M/UL (ref 3.95–5.11)
SERVICE CMNT-IMP: NORMAL
SODIUM SERPL-SCNC: 139 MMOL/L (ref 136–145)
SPECIMEN DESCRIPTION: NORMAL
TRIGL SERPL-MCNC: 113 MG/DL
VLDLC SERPL CALC-MCNC: 23 MG/DL (ref 1–30)
WBC OTHER # BLD: 3.3 K/UL (ref 3.5–11.3)

## 2024-11-15 PROCEDURE — 2580000003 HC RX 258: Performed by: NURSE PRACTITIONER

## 2024-11-15 PROCEDURE — 86140 C-REACTIVE PROTEIN: CPT

## 2024-11-15 PROCEDURE — 85025 COMPLETE CBC W/AUTO DIFF WBC: CPT

## 2024-11-15 PROCEDURE — 80061 LIPID PANEL: CPT

## 2024-11-15 PROCEDURE — 94640 AIRWAY INHALATION TREATMENT: CPT

## 2024-11-15 PROCEDURE — 6370000000 HC RX 637 (ALT 250 FOR IP)

## 2024-11-15 PROCEDURE — 6360000002 HC RX W HCPCS: Performed by: NURSE PRACTITIONER

## 2024-11-15 PROCEDURE — 36415 COLL VENOUS BLD VENIPUNCTURE: CPT

## 2024-11-15 PROCEDURE — 80053 COMPREHEN METABOLIC PANEL: CPT

## 2024-11-15 PROCEDURE — 94669 MECHANICAL CHEST WALL OSCILL: CPT

## 2024-11-15 PROCEDURE — 6370000000 HC RX 637 (ALT 250 FOR IP): Performed by: NURSE PRACTITIONER

## 2024-11-15 PROCEDURE — 1200000000 HC SEMI PRIVATE

## 2024-11-15 PROCEDURE — 94761 N-INVAS EAR/PLS OXIMETRY MLT: CPT

## 2024-11-15 PROCEDURE — 83735 ASSAY OF MAGNESIUM: CPT

## 2024-11-15 PROCEDURE — 94664 DEMO&/EVAL PT USE INHALER: CPT

## 2024-11-15 PROCEDURE — 6370000000 HC RX 637 (ALT 250 FOR IP): Performed by: INTERNAL MEDICINE

## 2024-11-15 RX ORDER — IPRATROPIUM BROMIDE AND ALBUTEROL SULFATE 2.5; .5 MG/3ML; MG/3ML
1 SOLUTION RESPIRATORY (INHALATION)
Status: DISCONTINUED | OUTPATIENT
Start: 2024-11-15 | End: 2024-11-16 | Stop reason: HOSPADM

## 2024-11-15 RX ORDER — HYDROCHLOROTHIAZIDE 25 MG/1
50 TABLET ORAL DAILY
Status: DISCONTINUED | OUTPATIENT
Start: 2024-11-15 | End: 2024-11-16 | Stop reason: HOSPADM

## 2024-11-15 RX ORDER — ALBUTEROL SULFATE 0.83 MG/ML
2.5 SOLUTION RESPIRATORY (INHALATION) EVERY 4 HOURS PRN
Status: DISCONTINUED | OUTPATIENT
Start: 2024-11-15 | End: 2024-11-16 | Stop reason: HOSPADM

## 2024-11-15 RX ORDER — GUAIFENESIN/DEXTROMETHORPHAN 100-10MG/5
5 SYRUP ORAL EVERY 4 HOURS PRN
Status: DISCONTINUED | OUTPATIENT
Start: 2024-11-15 | End: 2024-11-16 | Stop reason: HOSPADM

## 2024-11-15 RX ADMIN — CITALOPRAM HYDROBROMIDE 40 MG: 20 TABLET ORAL at 08:11

## 2024-11-15 RX ADMIN — ONDANSETRON 4 MG: 2 INJECTION, SOLUTION INTRAMUSCULAR; INTRAVENOUS at 18:51

## 2024-11-15 RX ADMIN — SODIUM CHLORIDE, PRESERVATIVE FREE 10 ML: 5 INJECTION INTRAVENOUS at 20:17

## 2024-11-15 RX ADMIN — VALSARTAN 160 MG: 80 TABLET ORAL at 08:12

## 2024-11-15 RX ADMIN — ENOXAPARIN SODIUM 40 MG: 100 INJECTION SUBCUTANEOUS at 08:12

## 2024-11-15 RX ADMIN — SODIUM CHLORIDE, PRESERVATIVE FREE 10 ML: 5 INJECTION INTRAVENOUS at 08:12

## 2024-11-15 RX ADMIN — IPRATROPIUM BROMIDE AND ALBUTEROL SULFATE 1 DOSE: 2.5; .5 SOLUTION RESPIRATORY (INHALATION) at 19:35

## 2024-11-15 RX ADMIN — IPRATROPIUM BROMIDE AND ALBUTEROL SULFATE 1 DOSE: 2.5; .5 SOLUTION RESPIRATORY (INHALATION) at 11:07

## 2024-11-15 RX ADMIN — HYDROCHLOROTHIAZIDE 50 MG: 25 TABLET ORAL at 08:43

## 2024-11-15 RX ADMIN — ATENOLOL 25 MG: 25 TABLET ORAL at 08:12

## 2024-11-15 RX ADMIN — POTASSIUM CHLORIDE 20 MEQ: 1500 TABLET, EXTENDED RELEASE ORAL at 08:12

## 2024-11-15 RX ADMIN — GUAIFENESIN AND DEXTROMETHORPHAN 5 ML: 100; 10 SYRUP ORAL at 20:16

## 2024-11-15 RX ADMIN — ACETAMINOPHEN 650 MG: 325 TABLET ORAL at 11:40

## 2024-11-15 RX ADMIN — GUAIFENESIN AND DEXTROMETHORPHAN 5 ML: 100; 10 SYRUP ORAL at 03:48

## 2024-11-15 RX ADMIN — PANTOPRAZOLE SODIUM 40 MG: 40 TABLET, DELAYED RELEASE ORAL at 08:12

## 2024-11-15 RX ADMIN — IPRATROPIUM BROMIDE AND ALBUTEROL SULFATE 1 DOSE: 2.5; .5 SOLUTION RESPIRATORY (INHALATION) at 15:44

## 2024-11-15 RX ADMIN — ALUMINUM HYDROXIDE, MAGNESIUM HYDROXIDE, AND SIMETHICONE: 1200; 120; 1200 SUSPENSION ORAL at 14:57

## 2024-11-15 RX ADMIN — ACETAMINOPHEN 650 MG: 325 TABLET ORAL at 03:48

## 2024-11-15 ASSESSMENT — PAIN DESCRIPTION - PAIN TYPE: TYPE: ACUTE PAIN

## 2024-11-15 ASSESSMENT — PAIN DESCRIPTION - FREQUENCY: FREQUENCY: INTERMITTENT

## 2024-11-15 ASSESSMENT — PAIN DESCRIPTION - LOCATION
LOCATION: HEAD
LOCATION: HEAD;BACK

## 2024-11-15 ASSESSMENT — PAIN SCALES - GENERAL
PAINLEVEL_OUTOF10: 7
PAINLEVEL_OUTOF10: 5
PAINLEVEL_OUTOF10: 8

## 2024-11-15 ASSESSMENT — PAIN DESCRIPTION - DESCRIPTORS
DESCRIPTORS: ACHING
DESCRIPTORS: ACHING

## 2024-11-15 ASSESSMENT — PAIN - FUNCTIONAL ASSESSMENT: PAIN_FUNCTIONAL_ASSESSMENT: ACTIVITIES ARE NOT PREVENTED

## 2024-11-15 ASSESSMENT — PAIN DESCRIPTION - ONSET: ONSET: GRADUAL

## 2024-11-15 NOTE — VIRTUAL HEALTH
Fany Olmedo, was evaluated through a synchronous (real-time) audio-video encounter. The patient (and/or guardian if applicable) is aware that this is a billable service, which includes applicable co-pays. This virtual visit was conducted with patient's (and/or legal guardian's) consent. Patient identification was verified, and a caregiver was present when appropriate.  The patient was located at Facility (Appt Department): [facility]  [address] [address]  Loc: [phone]  The provider was located at Facility (Login Dept): [facility] DIS  [address] [id number] [phone]  Confirm you are appropriately licensed, registered, or certified to deliver care in the state where the patient is located as indicated above. If you are not or unsure, please re-schedule the visit: Yes, I confirm.   Consults     Total time spent on this encounter: Not billed by time    --Missael Wolf MD on 11/15/2024 at 2:14 PM    An electronic signature was used to authenticate this note.      [facility] - Initial Consult Note  Today's Date and Time: 11/15/2024, 2:14 PM    Impression :   Shortness of breath  Minimal ground glass basilar infiltrates with eosinophilia, raising question of hypersensitivity pneumonitis from exposure to Macrobid  Transaminitis  Polyarthralgias     Recommendations:   Avoid exposure to Macrobid  Monitor eosinophilia  Monitor LFTs      Nitrofurantoin can cause acute, subacute or chronic pulmonary hypersensitivity reactions.  Patient attributes the onset of some of her symptoms to taking Macrobid  Headaches, drowsiness, arthralgias can be caused by Macrobid.  She has eosinophilia and subtle ground glass infiltrates in the lungs by CT which can be caused by Macrobid.  She has elevation of the liver enzymes which can occur with Macrobid.  I would avoid Nitrofurantoin and monitor the evolution of symptoms.  Please call with questions.    Missael Wolf MD  Office: [phone]

## 2024-11-15 NOTE — PROGRESS NOTES
Comprehensive Nutrition Assessment    Type and Reason for Visit:  Initial, Consult, Patient education    Nutrition Recommendations/Plan:   Low sodium education  Encourage oral intakes  Recommend 2000 units vit D3 daily  Monitor glucose     Malnutrition Assessment:  Malnutrition Status:  At risk for malnutrition (11/15/24 0743)    Context:  Acute Illness     Findings of the 6 clinical characteristics of malnutrition:  Energy Intake:  Mild decrease in energy intake (last 5 days)  Weight Loss:  No weight loss     Body Fat Loss:  No body fat loss     Muscle Mass Loss:  No muscle mass loss    Fluid Accumulation:  Mild Extremities (facial)   Strength:  Not Performed    Nutrition Assessment:    Food and nutrition related knowledge deficit r/t altered cardiac status, AEB new restrictions for low sodium. \"Likes (her) salt\" but agreeable and appears to understand reduction and healthier choices. Lower PO for last 5 days with some taste aversions to sweets/sugar without new medications reported. Some nausea pta as well. Discussed potential use of ONS if PO without improvement, but may not tolerate r/t taste changes. A1C creeping up into IFG/prediabetes range. Low vit D without supplementation. Provided and discussed low sodium in diet as per c/s.    Nutrition Related Findings:    non pitting BLE and right cheek edema. Wound Type: None       Current Nutrition Intake & Therapies:    Average Meal Intake: Unable to assess (no PO records)  Average Supplements Intake: None Ordered  ADULT DIET; Regular; No Added Salt (3-4 gm)    Anthropometric Measures:  Height: 152.4 cm (5')  Ideal Body Weight (IBW): 100 lbs (45 kg)    Admission Body Weight: 70.7 kg (155 lb 14.4 oz)  Current Body Weight: 71.6 kg (157 lb 12.8 oz), 157.8 % IBW. Weight Source: Bed scale  Current BMI (kg/m2): 30.8  Usual Body Weight: 71.4 kg (157 lb 8 oz) (a week ago)     % Weight Change (Calculated): 0.2  Weight Adjustment For: No Adjustment                 BMI  Edema    Discharge Planning:    Continue current diet     Pavan Deleon RD, LD  Contact: [phone]

## 2024-11-15 NOTE — PROGRESS NOTES
Progress Note    SUBJECTIVE:    Patient seen for f/u of Shortness of breath.  She resting in bed continues to feel the same with the HA, myalgias, arthralgias.  Having a burning cough.  No diarrhea.  Afebrile.       ROS:   Constitutional: negative  for fevers, and negative for chills.  Respiratory: positive for shortness of breath, positive for cough, and negative for wheezing  Cardiovascular: negative for chest pain, and negative for palpitations  Gastrointestinal: negative for abdominal pain, negative for nausea,negative for vomiting, negative for diarrhea, and negative for constipation     All other systems were reviewed with the patient and are negative unless otherwise stated in HPI      OBJECTIVE:      Vitals:   Vitals:    11/15/24 0645   BP: (!) 150/82   Pulse: 65   Resp: 18   Temp: 97 °F (36.1 °C)   SpO2: 95%     Weight - Scale: 71.6 kg (157 lb 12.8 oz)   Height: 152.4 cm (5')     Weight  Wt Readings from Last 3 Encounters:   11/15/24 71.6 kg (157 lb 12.8 oz)   11/14/24 70.8 kg (156 lb)   11/08/24 71.4 kg (157 lb 8 oz)     Body mass index is 30.82 kg/m².    24HR INTAKE/OUTPUT:      Intake/Output Summary (Last 24 hours) at 11/15/2024 0745  Last data filed at 11/14/2024 2036  Gross per 24 hour   Intake 10 ml   Output 1800 ml   Net -1790 ml     -----------------------------------------------------------------  Exam:    GEN:    Awake, alert and oriented x3.   EYES:  EOMI, pupils equal   NECK: Supple. No lymphadenopathy.  No carotid bruit  CVS:    regular rate and rhythm, no audible murmur  PULM:  CTA, no wheezes, rales or rhonchi, no acute respiratory distress  ABD:    Bowels sounds normal.  Abdomen is soft.  No distention.  no tenderness to palpation.   EXT:   no edema bilaterally .  No calf tenderness.   NEURO: Moves all extremities.  Motor and sensory are grossly intact  SKIN:  No rashes.  No skin lesions.    -----------------------------------------------------------------    Diagnostic Data:      Complete

## 2024-11-15 NOTE — PROGRESS NOTES
Patient resting comfortably at this time. Patient denies any pain and denies any other needs at this time. Patient does complain of nausea in which she will receive PRN zofran for. Patient states she is dizzy when first getting up but not as she is walking. SOB only intermittently on exertion. Patient alert & oriented x4 and able to answer all questions appropriately and follow commands. Assessment and vitals as charted. Bed locked, gripper socks on, call light within reach and able to use appropriately. Will continue to monitor this shift.

## 2024-11-15 NOTE — PLAN OF CARE
Problem: Discharge Planning  Goal: Discharge to home or other facility with appropriate resources  Outcome: Not Progressing     Problem: Safety - Adult  Goal: Free from fall injury  Outcome: Progressing     Problem: Pain  Goal: Verbalizes/displays adequate comfort level or baseline comfort level  Outcome: Progressing  Flowsheets (Taken 11/15/2024 1555)  Verbalizes/displays adequate comfort level or baseline comfort level:   Encourage patient to monitor pain and request assistance   Assess pain using appropriate pain scale   Administer analgesics based on type and severity of pain and evaluate response   Implement non-pharmacological measures as appropriate and evaluate response     Problem: Discharge Planning  Goal: Discharge to home or other facility with appropriate resources  Outcome: Not Progressing

## 2024-11-15 NOTE — PROGRESS NOTES
Vitals and assessment done at this time. See flow sheet for more details. Pt denied any shortness of breath at this time, dizziness, and pain. Pt did state when she coughs she's having some burning in her chest. Pt's lungs are clear and diminished in the bases. Pts BLE are nonpitting +1. Pt stated she has not had much of an appetite and has developed a cough over night. Pt denied any sore throat and congestion. Pt denied any further needs at this time. Call light within reach, will continue to monitor.

## 2024-11-15 NOTE — PROGRESS NOTES
Writer at bedside for shift assessment. PT A&O x3, vitals and assessment completed and charted. See flowsheets. PT reports throbbing headache, PRN tylenol given. Call light in reach, bed in the lowest position, care on-going.

## 2024-11-15 NOTE — RT PROTOCOL NOTE
RESPIRATORY ASSESSMENT PROTOCOL                                                                                              Patient Name: Fany Olmedo Room#: 0318/0318-01 : 1955     Admitting diagnosis: SOB (shortness of breath) [R06.02]  Shortness of breath [R06.02]       Medical History:   Past Medical History:   Diagnosis Date    Arthritis 2024    physical therapy [facility]    Depression     H/O total hysterectomy     History of colon polyps     History of partial surgical removal of colon     Hyperlipidemia     Hypertension     PCP    FABIEN on CPAP     cpap    Other and unspecified hyperlipidemia     Polyarthralgia 2024    Polyp of cecum     PONV (postoperative nausea and vomiting)     Unspecified acute reaction to stress     Unspecified essential hypertension        PATIENT ASSESSMENT    LABORATORY DATA  Hematology:   Lab Results   Component Value Date/Time    WBC 3.3 11/15/2024 05:55 AM    RBC 3.83 11/15/2024 05:55 AM    RBC 4.57 10/30/2023 08:50 AM    HGB 11.4 11/15/2024 05:55 AM    HCT 33.6 11/15/2024 05:55 AM     11/15/2024 05:55 AM     Chemistry:  No results found for: \"PHART\", \"BSQ2JBP\", \"PO2ART\", \"J8FTBHNC\", \"EJQ3HKP\", \"PBEA\", \"NBEA\"    VITALS  Pulse: 69   Respirations: 18  BP: 133/77  SpO2: 97 % O2 Device: None (Room air)  Temp: 98 °F (36.7 °C)    SKIN COLOR  [x] Normal  [] Pale  [] Dusky  [] Cyanotic    RESPIRATORY PATTERN  [x] Normal  [] Dyspnea  [] Cheyne-Gal[place]  [] Kussmaul  [] Biots    AMBULATORY  [x] Yes  [] No  [] With Assistance    PEAK FLOW  Predicted:     Personal Best:            Patient Acuity 0 1 2 3 4 Score   Level of Consciousness (LOC) [x]  Alert & Oriented or Pt normal LOC []  Confused;follows directions []  Confused & uncooper-ative []  Obtunded []  Comatose 0   Respiratory Rate  (RR) [x]  Reg. rate & pattern. 12 - 20 bpm  []  Increased RR. Greater than 20 bpm   []  SOB w/ exertion or RR greater than 24 bpm []  Access- ory muscle use at rest.  Abn.  resp. []  SOB at rest.   0   Bilateral Breath Sounds (BBS) [x]  Clear []  Diminish-ed bases  []  Diminish-ed t/o, or rales   []  Sporadic, scattered wheezes or rhonchi []  Persistentwheezes and, or absent BBS 0   Cough [x]  Strong, effective, & non-prod. []  Effective & prod. Less than 25 ml (2 TBSP) over past 24 hrs []  Ineffective & non-prod to less than 25 ML over past 24 hrs []  Ineffective and, or greater than 25 ml sputum prod. past 24 hrs. []  Nonspon- taneous; Requires suctioning 0   Pulmonary History  (PULM HX) []  No smoking and no chronic pulmonary history []  Former smoker. Quit over 12 mos. ago []  Current smoker or quit w/ in 12 mos []  Pulm. History and, or 20 pk/yr smoking hx [x]  Admitted w/ acute pulm. dx and, or has been admitted w/ pulm. dx 2 or more times over past 12 mos 4   Surgical History this Admit  (SURG HX) [x]  No surgery []  General surgery []  Lower abdominal []  Thoracic or upper abdominal   []  Thoracic w/ pulm. disease 0   Chest X-Ray (CXR)/CT Scan []  Clear or not applicable []  Not available []  Atelectasis or pleural effusions []  Localized infiltrate or pulm. edema [x]  Con-solidated Infiltrates, bilateral, or in more than 1 lobe 4   TOTAL ACUITY: 8       CARE PLAN    If Acuity Level is 2, 3, or 4 in any of the following:    [x] BILATERAL BREATH SOUNDS (BBS)     [x] PULMONARY HISTORY (PULM HX)  [] Respiratory Rate  (RR)    Goal: Improve respiratory functions in patients with airway disease and decrease WOB    [x] AEROSOL PROTOCOL    Total Acuity:   14-28  []  Secondary Assessment in 24 hrs Total Acuity:  9-13  []  Secondary Assessment in 24 hrs Total Acuity:  4-8  [x]  Secondary Assessment in 24 hrs Total Acuity:  0-3  []  Secondary Assessment in 48 hrs   [facility] AEROSOL THERAPY with  [physician-ordered bronchodilator(s)] q 4 & Albuterol PRN q2 hrs.   Breath-Actuated Neb if BBS Acuity = 4, and pt. can use MP. Notify physician if condition deteriorates.  [facility] AEROSOL THERAPY

## 2024-11-16 VITALS
OXYGEN SATURATION: 95 % | DIASTOLIC BLOOD PRESSURE: 90 MMHG | HEART RATE: 65 BPM | RESPIRATION RATE: 16 BRPM | WEIGHT: 156.1 LBS | BODY MASS INDEX: 30.65 KG/M2 | HEIGHT: 60 IN | TEMPERATURE: 97.6 F | SYSTOLIC BLOOD PRESSURE: 146 MMHG

## 2024-11-16 LAB
ALBUMIN SERPL-MCNC: 3.8 G/DL (ref 3.5–5.2)
ALBUMIN/GLOB SERPL: 1.7 {RATIO} (ref 1–2.5)
ALP SERPL-CCNC: 87 U/L (ref 35–104)
ALT SERPL-CCNC: 196 U/L (ref 10–35)
ANION GAP SERPL CALCULATED.3IONS-SCNC: 11 MMOL/L (ref 9–16)
AST SERPL-CCNC: 66 U/L (ref 10–35)
BASOPHILS # BLD: <0.03 K/UL (ref 0–0.2)
BASOPHILS NFR BLD: 0 % (ref 0–2)
BILIRUB SERPL-MCNC: 0.3 MG/DL (ref 0–1.2)
BUN SERPL-MCNC: 8 MG/DL (ref 8–23)
BUN/CREAT SERPL: 11 (ref 9–20)
CALCIUM SERPL-MCNC: 9.2 MG/DL (ref 8.6–10.4)
CHLORIDE SERPL-SCNC: 99 MMOL/L (ref 98–107)
CO2 SERPL-SCNC: 29 MMOL/L (ref 20–31)
CREAT SERPL-MCNC: 0.7 MG/DL (ref 0.5–0.9)
CRP SERPL HS-MCNC: 28.8 MG/L (ref 0–5)
EOSINOPHIL # BLD: 0.31 K/UL (ref 0–0.44)
EOSINOPHILS RELATIVE PERCENT: 8 % (ref 1–4)
ERYTHROCYTE [DISTWIDTH] IN BLOOD BY AUTOMATED COUNT: 13.9 % (ref 11.8–14.4)
GFR, ESTIMATED: 89 ML/MIN/1.73M2
GLUCOSE SERPL-MCNC: 102 MG/DL (ref 74–99)
HCT VFR BLD AUTO: 33.9 % (ref 36.3–47.1)
HGB BLD-MCNC: 11.3 G/DL (ref 11.9–15.1)
IMM GRANULOCYTES # BLD AUTO: <0.03 K/UL (ref 0–0.3)
IMM GRANULOCYTES NFR BLD: 0 %
LYMPHOCYTES NFR BLD: 1.03 K/UL (ref 1.1–3.7)
LYMPHOCYTES RELATIVE PERCENT: 28 % (ref 24–43)
MAGNESIUM SERPL-MCNC: 1.8 MG/DL (ref 1.6–2.4)
MCH RBC QN AUTO: 29.4 PG (ref 25.2–33.5)
MCHC RBC AUTO-ENTMCNC: 33.3 G/DL (ref 28.4–34.8)
MCV RBC AUTO: 88.1 FL (ref 82.6–102.9)
MONOCYTES NFR BLD: 0.4 K/UL (ref 0.1–1.2)
MONOCYTES NFR BLD: 11 % (ref 3–12)
NEUTROPHILS NFR BLD: 53 % (ref 36–65)
NEUTS SEG NFR BLD: 1.97 K/UL (ref 1.5–8.1)
NRBC BLD-RTO: 0 PER 100 WBC
PLATELET # BLD AUTO: 207 K/UL (ref 138–453)
PMV BLD AUTO: 9.7 FL (ref 8.1–13.5)
POTASSIUM SERPL-SCNC: 4 MMOL/L (ref 3.7–5.3)
PROT SERPL-MCNC: 6.1 G/DL (ref 6.6–8.7)
RBC # BLD AUTO: 3.85 M/UL (ref 3.95–5.11)
SODIUM SERPL-SCNC: 139 MMOL/L (ref 136–145)
WBC OTHER # BLD: 3.7 K/UL (ref 3.5–11.3)

## 2024-11-16 PROCEDURE — 94761 N-INVAS EAR/PLS OXIMETRY MLT: CPT

## 2024-11-16 PROCEDURE — 6370000000 HC RX 637 (ALT 250 FOR IP): Performed by: INTERNAL MEDICINE

## 2024-11-16 PROCEDURE — 85025 COMPLETE CBC W/AUTO DIFF WBC: CPT

## 2024-11-16 PROCEDURE — 99222 1ST HOSP IP/OBS MODERATE 55: CPT | Performed by: INTERNAL MEDICINE

## 2024-11-16 PROCEDURE — 6360000002 HC RX W HCPCS: Performed by: NURSE PRACTITIONER

## 2024-11-16 PROCEDURE — 6370000000 HC RX 637 (ALT 250 FOR IP): Performed by: NURSE PRACTITIONER

## 2024-11-16 PROCEDURE — 94640 AIRWAY INHALATION TREATMENT: CPT

## 2024-11-16 PROCEDURE — 94669 MECHANICAL CHEST WALL OSCILL: CPT

## 2024-11-16 PROCEDURE — 80053 COMPREHEN METABOLIC PANEL: CPT

## 2024-11-16 PROCEDURE — 2580000003 HC RX 258: Performed by: NURSE PRACTITIONER

## 2024-11-16 PROCEDURE — 86140 C-REACTIVE PROTEIN: CPT

## 2024-11-16 PROCEDURE — 36415 COLL VENOUS BLD VENIPUNCTURE: CPT

## 2024-11-16 PROCEDURE — 83735 ASSAY OF MAGNESIUM: CPT

## 2024-11-16 RX ORDER — GUAIFENESIN AND DEXTROMETHORPHAN HYDROBROMIDE 1200; 60 MG/1; MG/1
1 TABLET, EXTENDED RELEASE ORAL 2 TIMES DAILY PRN
COMMUNITY
Start: 2024-11-16

## 2024-11-16 RX ORDER — ALBUTEROL SULFATE 0.83 MG/ML
2.5 SOLUTION RESPIRATORY (INHALATION) EVERY 4 HOURS PRN
Qty: 100 EACH | Refills: 0 | Status: SHIPPED | OUTPATIENT
Start: 2024-11-16 | End: 2024-11-21 | Stop reason: SDUPTHER

## 2024-11-16 RX ADMIN — ATENOLOL 25 MG: 25 TABLET ORAL at 09:14

## 2024-11-16 RX ADMIN — VALSARTAN 160 MG: 80 TABLET ORAL at 09:13

## 2024-11-16 RX ADMIN — ACETAMINOPHEN 650 MG: 325 TABLET ORAL at 04:29

## 2024-11-16 RX ADMIN — HYDROCHLOROTHIAZIDE 50 MG: 25 TABLET ORAL at 09:14

## 2024-11-16 RX ADMIN — POTASSIUM CHLORIDE 20 MEQ: 1500 TABLET, EXTENDED RELEASE ORAL at 09:14

## 2024-11-16 RX ADMIN — PANTOPRAZOLE SODIUM 40 MG: 40 TABLET, DELAYED RELEASE ORAL at 07:41

## 2024-11-16 RX ADMIN — IPRATROPIUM BROMIDE AND ALBUTEROL SULFATE 1 DOSE: 2.5; .5 SOLUTION RESPIRATORY (INHALATION) at 14:45

## 2024-11-16 RX ADMIN — ONDANSETRON 4 MG: 2 INJECTION, SOLUTION INTRAMUSCULAR; INTRAVENOUS at 10:44

## 2024-11-16 RX ADMIN — ENOXAPARIN SODIUM 40 MG: 100 INJECTION SUBCUTANEOUS at 09:14

## 2024-11-16 RX ADMIN — SODIUM CHLORIDE, PRESERVATIVE FREE 10 ML: 5 INJECTION INTRAVENOUS at 09:14

## 2024-11-16 RX ADMIN — IPRATROPIUM BROMIDE AND ALBUTEROL SULFATE 1 DOSE: 2.5; .5 SOLUTION RESPIRATORY (INHALATION) at 07:47

## 2024-11-16 RX ADMIN — CITALOPRAM HYDROBROMIDE 40 MG: 20 TABLET ORAL at 09:13

## 2024-11-16 ASSESSMENT — PAIN DESCRIPTION - ORIENTATION: ORIENTATION: ANTERIOR

## 2024-11-16 ASSESSMENT — PAIN DESCRIPTION - DESCRIPTORS: DESCRIPTORS: ACHING

## 2024-11-16 ASSESSMENT — PAIN DESCRIPTION - LOCATION: LOCATION: HEAD

## 2024-11-16 ASSESSMENT — PAIN SCALES - GENERAL
PAINLEVEL_OUTOF10: 0
PAINLEVEL_OUTOF10: 4

## 2024-11-16 ASSESSMENT — PAIN - FUNCTIONAL ASSESSMENT: PAIN_FUNCTIONAL_ASSESSMENT: ACTIVITIES ARE NOT PREVENTED

## 2024-11-16 NOTE — DISCHARGE SUMMARY
Rishi Chahal M.D.  Internal Medicine Discharge Summary    Patient ID:  Fany Olmedo  [id number]  1955    Admission date: 11/14/2024    Discharge date: 11/16/2024     Admitting Physician: Zoran Cervantes MD     Primary Care Physician: Samy Irvin, APRN - CNP     Primary Discharge Diagnoses:   Principal Problem:    Shortness of breath  Active Problems:    Essential hypertension  Resolved Problems:    * No resolved hospital problems. *       Additional Diagnoses:       Diagnosis Date    Arthritis 01/02/2024    physical therapy [facility]    Depression     H/O total hysterectomy     History of colon polyps     History of partial surgical removal of colon 2023    Hyperlipidemia     Hypertension     PCP    FABIEN on CPAP     cpap    Other and unspecified hyperlipidemia     Polyarthralgia 08/27/2024    Polyp of cecum     PONV (postoperative nausea and vomiting)     Unspecified acute reaction to stress     Unspecified essential hypertension         Hospital Course:   Fany Olmedo is a 69 y.o. female who was admitted for Shortness of breath.    Ms. Olmedo  had an extensive workup which was essentially negative .  She was treated with duonebs but did not require any Abx or steroids.  She was not hypoxic and did not require any supplemental O2.  She was recently tx'd with macrobid for UTI, and it is thought she may have had an allergic reaction due to eosinophilia and elevated transaminases.  Macrobid was DC'd and her LFT's are improving.  She is feeling much better today and requesting to go home.    She was deemed medically stable for discharge and was amenable to the discharge plan.      Discharge Exam:  GEN:    Awake, alert and oriented x3.   EYES:  EOMI, pupils equal   NECK: Supple. No lymphadenopathy.  No carotid bruit  CVS:    regular rate and rhythm, no audible murmur  PULM:  CTA, no wheezes, rales or rhonchi, no acute respiratory distress  ABD:    Bowels sounds normal.  Abdomen is

## 2024-11-16 NOTE — PROGRESS NOTES
Patient discharged at this time. Patient being discharged home. Patient left floor via wheelchair.

## 2024-11-16 NOTE — DISCHARGE INSTRUCTIONS
nebulizer machine from medical supply store.    Per Dr. Chahal HOLD Crestor until follow-up appointment on Tuesday. Ask PCP for next steps regarding when to restart medication, due to increased liver enzymes.

## 2024-11-16 NOTE — PLAN OF CARE
Problem: Discharge Planning  Goal: Discharge to home or other facility with appropriate resources  Outcome: Completed     Problem: Safety - Adult  Goal: Free from fall injury  Outcome: Completed     Problem: Pain  Goal: Verbalizes/displays adequate comfort level or baseline comfort level  Outcome: Completed     Problem: Nutrition Deficit:  Goal: Optimize nutritional status  Outcome: Completed

## 2024-11-16 NOTE — PLAN OF CARE
Problem: Safety - Adult  Goal: Free from fall injury  Outcome: Progressing  Flowsheets (Taken 11/15/2024 0930 by Yonas Solares RN)  Free From Fall Injury: Instruct family/caregiver on patient safety  Note: Bed locked, side rails up, gripper socks on, call light within reach and able to use appropriately. Will continue to monitor this shift.     Problem: Pain  Goal: Verbalizes/displays adequate comfort level or baseline comfort level  Outcome: Progressing  Flowsheets  Taken 11/15/2024 2211  Verbalizes/displays adequate comfort level or baseline comfort level:   Encourage patient to monitor pain and request assistance   Assess pain using appropriate pain scale   Administer analgesics based on type and severity of pain and evaluate response   Implement non-pharmacological measures as appropriate and evaluate response  Taken 11/15/2024 1847  Verbalizes/displays adequate comfort level or baseline comfort level: Encourage patient to monitor pain and request assistance  Note: Patient denies any pain at this time. Will continue to monitor this shift.     Problem: Nutrition Deficit:  Goal: Optimize nutritional status  Outcome: Progressing  Flowsheets (Taken 11/15/2024 2211)  Nutrient intake appropriate for improving, restoring, or maintaining nutritional needs: Assess nutritional status and recommend course of action  Note: Encouraging foods and liquids as tolerated. Will continue to monitor this shift.

## 2024-11-16 NOTE — PROGRESS NOTES
Discharge education completed at this time. Follow-up appointments reviewed. New and current medications reviewed at this time, with next due time noted. Patient educated to HOLD Crestor until further told by her PCP at follow-up appointment. Patient given paper order for nebulzier machine and instructed to take to medical supply store to fill. Signs and symptoms of a stroke and heart attack reviewed. Importance of taking full course of antibiotic until prescription is gone educated on. No further questions or concerns at this time.

## 2024-11-16 NOTE — PROGRESS NOTES
Entered patient's room for morning vital signs and head to toe assessment. Patient resting in the bed at this time. A&O x4, calm, and cooperative. Patient denies of pain at this time. Vital signs and head to toe assessment completed at this time, see flowsheets for more details. Patient does report a continued decrease in appetite. Patient denies no more needs at this time. Call light within reach. Bed/chair wheels locked. Bed in lowest position.

## 2024-11-16 NOTE — PROGRESS NOTES
Rishi Chahal M.D.  Internal Medicine Progress Note    Patient: Fany Olmedo  Date of Admission: 11/14/2024  9:49 AM  Date of Evaluation: 11/16/2024      SUBJECTIVE:    Fany Olmedo is a 69 y.o. female who was seen today for follow up of Shortness of breath.  She is feeling better today.  She still has a mild cough but overall is feeling a lot better.  She denies fever or chills and has been afebrile.  She  still doesn't have much of an appetite but denies any nausea or vomiting .    ROS:   Constitutional: negative  for fevers, and negative for chills.  Respiratory: negative for shortness of breath, negative for cough, and negative for wheezing  Cardiovascular: negative for chest pain, and negative for palpitations  Gastrointestinal: negative for abdominal pain, negative for nausea,negative for vomiting, negative for diarrhea, and negative for constipation     All other systems were reviewed with the patient and are negative unless otherwise stated in HPI    -----------------------------------------------------------------  OBJECTIVE:  Vitals:   Temp: 97.6 °F (36.4 °C)  BP: (!) 146/90  Respirations: 16  Pulse: 64  SpO2: 94 % on room air    Weight  Wt Readings from Last 3 Encounters:   11/16/24 70.8 kg (156 lb 1.6 oz)   11/14/24 70.8 kg (156 lb)   11/08/24 71.4 kg (157 lb 8 oz)     Body mass index is 30.49 kg/m².    24HR INTAKE/OUTPUT:      Intake/Output Summary (Last 24 hours) at 11/16/2024 1120  Last data filed at 11/16/2024 0253  Gross per 24 hour   Intake 1210 ml   Output 3000 ml   Net -1790 ml       Exam:  GEN:    Awake, alert and oriented x 3.   EYES:  EOMI, pupils equal   NECK: Supple. No lymphadenopathy.  No carotid bruit  CVS:    regular rate and rhythm, no audible murmur  PULM:  CTA, no wheezes, rales or rhonchi, no acute respiratory distress  ABD:    Bowels sounds normal.  Abdomen is soft.  No distention.  no tenderness to palpation.   EXT:   no edema bilaterally .  No calf tenderness.   NEURO:  these findings likely represent subsegmental atelectasis, however few scattered rounded areas of ground-glass are also noted suggestive of an inflammatory process.  No focal area of consolidation.  No evidence for interstitial edema.  No effusion.  The central airway is patent. Soft Tissues/Bones: No acute abnormality identified. Abdomen/Pelvis: Organs: The liver, gallbladder, pancreas, spleen, kidneys, and adrenals reveal no acute findings. GI/Bowel: There is no bowel dilatation or wall thickening identified. Postoperative findings at the base of the cecum. Pelvis: No acute findings. Peritoneum/Retroperitoneum: No free air or free fluid.  The aorta is normal in caliber.  The visceral branches are patent.  No lymphadenopathy. Bones/Soft Tissues: No acute findings. *Unless otherwise specified, incidental findings do not require dedicated imaging follow-up.     1. Minimal dependent ground-glass opacities in the lungs are nonspecific, but could represent an inflammatory process. 2. No acute inflammatory process identified in the abdomen or pelvis.     Echo (TTE) complete (PRN contrast/bubble/strain/3D)    Result Date: 11/14/2024    Left Ventricle: Normal left ventricular systolic function with a visually estimated EF of 65 - 70%. Left ventricle size is normal. Normal wall thickness. Normal wall motion. Normal diastolic function.   Right Ventricle: Right ventricle size is normal. Normal systolic function.   Mitral Valve: Trace regurgitation.   Tricuspid Valve: Trace regurgitation. The estimated RVSP is 11 mmHg.   Aorta: Normal sized aortic root and ascending aorta.   Pericardium: No pericardial effusion.     XR CHEST (2 VW)    Result Date: 11/14/2024  EXAMINATION: TWO XRAY VIEWS OF THE CHEST 11/14/2024 11:49 am COMPARISON: 08/30/2024 HISTORY: ORDERING SYSTEM PROVIDED HISTORY: shortness of breath TECHNOLOGIST PROVIDED HISTORY: shortness of breath FINDINGS: Lines and tubes: None The lungs are clear.  Heart size is normal.

## 2024-12-03 ENCOUNTER — HOSPITAL ENCOUNTER (OUTPATIENT)
Age: 69
Discharge: HOME | End: 2024-12-05
Payer: MEDICARE

## 2024-12-03 ENCOUNTER — HOSPITAL ENCOUNTER (OUTPATIENT)
Dept: NUCLEAR MEDICINE | Age: 69
Discharge: HOME | End: 2024-12-05
Payer: MEDICARE

## 2024-12-03 DIAGNOSIS — R07.9 CHEST PAIN, UNSPECIFIED TYPE: ICD-10-CM

## 2024-12-03 PROCEDURE — 6360000002 HC RX W HCPCS: Performed by: FAMILY MEDICINE

## 2024-12-03 PROCEDURE — 93017 CV STRESS TEST TRACING ONLY: CPT

## 2024-12-03 PROCEDURE — A9500 TC99M SESTAMIBI: HCPCS | Performed by: NURSE PRACTITIONER

## 2024-12-03 PROCEDURE — 3430000000 HC RX DIAGNOSTIC RADIOPHARMACEUTICAL: Performed by: NURSE PRACTITIONER

## 2024-12-03 RX ORDER — REGADENOSON 0.08 MG/ML
0.4 INJECTION, SOLUTION INTRAVENOUS
Status: COMPLETED | OUTPATIENT
Start: 2024-12-03 | End: 2024-12-03

## 2024-12-03 RX ORDER — TETRAKIS(2-METHOXYISOBUTYLISOCYANIDE)COPPER(I) TETRAFLUOROBORATE 1 MG/ML
30 INJECTION, POWDER, LYOPHILIZED, FOR SOLUTION INTRAVENOUS
Status: COMPLETED | OUTPATIENT
Start: 2024-12-03 | End: 2024-12-03

## 2024-12-03 RX ADMIN — Medication 30 MILLICURIE: at 10:15

## 2024-12-03 RX ADMIN — REGADENOSON 0.4 MG: 0.08 INJECTION, SOLUTION INTRAVENOUS at 10:28

## 2024-12-04 ENCOUNTER — HOSPITAL ENCOUNTER (OUTPATIENT)
Dept: NUCLEAR MEDICINE | Age: 69
Discharge: HOME | End: 2024-12-06
Payer: MEDICARE

## 2024-12-04 LAB
NUC STRESS EJECTION FRACTION: 73 %
STRESS BASELINE DIAS BP: 70 MMHG
STRESS BASELINE HR: 56 BPM
STRESS BASELINE SYS BP: 132 MMHG
STRESS ESTIMATED WORKLOAD: 1 METS
STRESS PEAK DIAS BP: 68 MMHG
STRESS PEAK SYS BP: 118 MMHG
STRESS PERCENT HR ACHIEVED: 54 %
STRESS POST PEAK HR: 81 BPM
STRESS RATE PRESSURE PRODUCT: 9558 BPM*MMHG
STRESS TARGET HR: 151 BPM

## 2024-12-04 PROCEDURE — A9500 TC99M SESTAMIBI: HCPCS | Performed by: NURSE PRACTITIONER

## 2024-12-04 PROCEDURE — 3430000000 HC RX DIAGNOSTIC RADIOPHARMACEUTICAL: Performed by: NURSE PRACTITIONER

## 2024-12-04 PROCEDURE — 78452 HT MUSCLE IMAGE SPECT MULT: CPT | Performed by: FAMILY MEDICINE

## 2024-12-04 PROCEDURE — 93016 CV STRESS TEST SUPVJ ONLY: CPT | Performed by: FAMILY MEDICINE

## 2024-12-04 PROCEDURE — 93018 CV STRESS TEST I&R ONLY: CPT | Performed by: FAMILY MEDICINE

## 2024-12-04 RX ORDER — TETRAKIS(2-METHOXYISOBUTYLISOCYANIDE)COPPER(I) TETRAFLUOROBORATE 1 MG/ML
30 INJECTION, POWDER, LYOPHILIZED, FOR SOLUTION INTRAVENOUS
Status: COMPLETED | OUTPATIENT
Start: 2024-12-04 | End: 2024-12-04

## 2024-12-04 RX ADMIN — Medication 30 MILLICURIE: at 08:31

## 2024-12-05 NOTE — RESULT ENCOUNTER NOTE
Please call pt and inform them their stress test results show low risk for CAD.   She how she is doing and if still having problems needs f/u

## 2025-01-07 ENCOUNTER — HOSPITAL ENCOUNTER (OUTPATIENT)
Dept: PULMONOLOGY | Age: 70
Discharge: HOME | End: 2025-01-07
Payer: MEDICARE

## 2025-01-07 DIAGNOSIS — R06.02 SHORTNESS OF BREATH: ICD-10-CM

## 2025-01-07 PROCEDURE — 94729 DIFFUSING CAPACITY: CPT

## 2025-01-07 PROCEDURE — 94060 EVALUATION OF WHEEZING: CPT

## 2025-01-07 PROCEDURE — 6370000000 HC RX 637 (ALT 250 FOR IP): Performed by: INTERNAL MEDICINE

## 2025-01-07 PROCEDURE — 94664 DEMO&/EVAL PT USE INHALER: CPT

## 2025-01-07 PROCEDURE — 94726 PLETHYSMOGRAPHY LUNG VOLUMES: CPT

## 2025-01-07 RX ORDER — ALBUTEROL SULFATE 90 UG/1
4 INHALANT RESPIRATORY (INHALATION) ONCE
Status: COMPLETED | OUTPATIENT
Start: 2025-01-07 | End: 2025-01-07

## 2025-01-07 RX ADMIN — ALBUTEROL SULFATE 4 PUFF: 90 AEROSOL, METERED RESPIRATORY (INHALATION) at 08:01

## 2025-01-08 NOTE — PROCEDURES
[address] [address]                           PULMONARY FUNCTION      PATIENT NAME: MARY GRACE BARRETT             : 1955  MED REC NO: [id number]                          ROOM:   ACCOUNT NO: [id number]                       ADMIT DATE: 2025  PROVIDER: Daniel Butcher MD      DATE OF PROCEDURE: 2025    SURGEON:  Daniel Butcher MD    REFERRING PHYSICIAN:  CARRIE ORELLANA    The patient's spirometry shows obstructive flow volume loop.  FEV1 of 126% predicted with 12% bronchodilator to 138% predicted with the ATS criteria for bronchodilation.  % predicted with no positive bronchodilator change.  FEV1/FVC ratio is 68, post-bronchodilator 77.  Total lung capacity by body box 110% predicted.  RV 83% predicted, which is normal.  Diffusion capacity is normal at 115% predicted, uncorrected and corrected is normal at 114% predicted.  Airway resistance is normal.    FINAL IMPRESSION:  Mild obstructive ventilatory dysfunction, which resolves with a bronchodilator response.  Evaluation for asthma is recommended.  Clinical correlation is advised.          DANIEL BUTCHER MD      D:  2025 12:05:16     T:  2025 13:50:40     /MABLE  Job #:  [id number]     Doc#:  [id number]

## 2025-01-10 NOTE — RESULT ENCOUNTER NOTE
PFt shows concerns for mild obstructive ventilatory dysfunction, concerns for asthma and needs to continue advair and using rescue inhaler and pulmonary appt next week.

## 2025-01-16 ENCOUNTER — TELEPHONE (OUTPATIENT)
Dept: PULMONOLOGY | Age: 70
End: 2025-01-16

## 2025-01-16 ENCOUNTER — OFFICE VISIT (OUTPATIENT)
Dept: PULMONOLOGY | Age: 70
End: 2025-01-16
Payer: MEDICARE

## 2025-01-16 VITALS
TEMPERATURE: 97.6 F | DIASTOLIC BLOOD PRESSURE: 65 MMHG | SYSTOLIC BLOOD PRESSURE: 118 MMHG | HEIGHT: 60 IN | BODY MASS INDEX: 30.8 KG/M2 | OXYGEN SATURATION: 97 % | RESPIRATION RATE: 18 BRPM | WEIGHT: 156.9 LBS | HEART RATE: 68 BPM

## 2025-01-16 DIAGNOSIS — G47.33 OSA ON CPAP: Primary | ICD-10-CM

## 2025-01-16 DIAGNOSIS — R06.00 DYSPNEA, UNSPECIFIED TYPE: ICD-10-CM

## 2025-01-16 DIAGNOSIS — I10 ESSENTIAL HYPERTENSION: ICD-10-CM

## 2025-01-16 DIAGNOSIS — J45.909 UNCOMPLICATED ASTHMA, UNSPECIFIED ASTHMA SEVERITY, UNSPECIFIED WHETHER PERSISTENT: ICD-10-CM

## 2025-01-16 PROCEDURE — G8417 CALC BMI ABV UP PARAM F/U: HCPCS | Performed by: INTERNAL MEDICINE

## 2025-01-16 PROCEDURE — 3074F SYST BP LT 130 MM HG: CPT | Performed by: INTERNAL MEDICINE

## 2025-01-16 PROCEDURE — G8427 DOCREV CUR MEDS BY ELIG CLIN: HCPCS | Performed by: INTERNAL MEDICINE

## 2025-01-16 PROCEDURE — 1090F PRES/ABSN URINE INCON ASSESS: CPT | Performed by: INTERNAL MEDICINE

## 2025-01-16 PROCEDURE — 3017F COLORECTAL CA SCREEN DOC REV: CPT | Performed by: INTERNAL MEDICINE

## 2025-01-16 PROCEDURE — G8399 PT W/DXA RESULTS DOCUMENT: HCPCS | Performed by: INTERNAL MEDICINE

## 2025-01-16 PROCEDURE — 1123F ACP DISCUSS/DSCN MKR DOCD: CPT | Performed by: INTERNAL MEDICINE

## 2025-01-16 PROCEDURE — 1036F TOBACCO NON-USER: CPT | Performed by: INTERNAL MEDICINE

## 2025-01-16 PROCEDURE — 1159F MED LIST DOCD IN RCRD: CPT | Performed by: INTERNAL MEDICINE

## 2025-01-16 PROCEDURE — 99214 OFFICE O/P EST MOD 30 MIN: CPT | Performed by: INTERNAL MEDICINE

## 2025-01-16 PROCEDURE — 1126F AMNT PAIN NOTED NONE PRSNT: CPT | Performed by: INTERNAL MEDICINE

## 2025-01-16 PROCEDURE — 3078F DIAST BP <80 MM HG: CPT | Performed by: INTERNAL MEDICINE

## 2025-01-16 RX ORDER — FLUTICASONE PROPIONATE AND SALMETEROL 250; 50 UG/1; UG/1
1 POWDER RESPIRATORY (INHALATION) EVERY 12 HOURS
Qty: 60 EACH | Refills: 11 | Status: SHIPPED | OUTPATIENT
Start: 2025-01-16 | End: 2025-01-16 | Stop reason: SDUPTHER

## 2025-01-16 RX ORDER — FLUTICASONE PROPIONATE AND SALMETEROL 250; 50 UG/1; UG/1
1 POWDER RESPIRATORY (INHALATION) EVERY 12 HOURS
Qty: 60 EACH | Refills: 11 | Status: SHIPPED | OUTPATIENT
Start: 2025-01-16

## 2025-01-16 NOTE — PROGRESS NOTES
OUTPATIENT PULMONARY PROGRESS NOTE      Patient:  Fany Olmedo  MRN: [id number]    Consulting Physician: Dr Irvin  Reason for Consult: FABIEN  [facility] Physician: Samy Irvin, JUAN - CNP    HISTORY OF PRESENT ILLNESS:   The patient is a 69 y.o. female for follow-up of sleep apnea    History of Present Illness  The patient is a 69-year-old female who presents for a follow-up of obstructive sleep apnea. She also has hypertension.    She reports experiencing shortness of breath for the past 2 to 3 months, which led to a pulmonary function test last Thursday by primary care physician. The onset of her respiratory issues was in November, she was admitted to hospital had a 3-day hospitalization due to an unidentified viral infection that she was told affected her respiratory system although no viral etiology or workup was positive. Despite extensive testing, no specific virus was identified. She does not experience any cough, wheezing, or shortness of breath at night. Her dyspnea is primarily exertional, occurring during activities such as walking. She had to use her rescue inhaler after climbing the stairs to the clinic today. She has no history of asthma or allergies and does not experience seasonal variations in her symptoms. She is currently on Advair twice daily, prescribed by Dr. Pablo integument: Prior to the breathing test, and uses a nebulizer four times daily. She also has a rescue inhaler for emergency use. She rinses her mouth after using the Advair inhaler. She has noticed an improvement in her symptoms since starting these treatments. She is up to date on her pneumonia vaccine. She smoked cigarettes when she was 19 years old.    CT of the chest and abdomen done when she was in the hospital on 11/14/2024 and it showed only mild dependent basilar atelectasis, otherwise no consolidation or infiltrate was seen on CT scan review.  She had a pulmonary function test done a week ago although both

## 2025-01-16 NOTE — TELEPHONE ENCOUNTER
Nona called in and asked that the prescription for Advair be sent to [facility] instead of [facility] due to cost. Order placed.

## 2025-01-16 NOTE — PATIENT INSTRUCTIONS
SURVEY:    Thank you for allowing us to care for you today.    You may be receiving a survey from [facility] regarding your visit today- electronically or via mail.      Please help us by completing the survey as this will provide the needed feedback to ensure we are providing the very best care for you and your family.    If you cannot score us a very good on any question, please call the office to discuss how we could have made your experience a very good one.    Thank you.       STAFF:    Karen Friedman, Meli DENTON      CLINICAL STAFF:    Tiffany LUX, Connie DENTON, Juliana DENTON, Sharifa LUX

## 2025-03-19 ENCOUNTER — HOSPITAL ENCOUNTER (OUTPATIENT)
Age: 70
Setting detail: SPECIMEN
Discharge: HOME | End: 2025-03-19

## 2025-03-19 DIAGNOSIS — R06.02 SHORTNESS OF BREATH: ICD-10-CM

## 2025-03-19 DIAGNOSIS — R79.9 ABNORMAL FINDING OF BLOOD CHEMISTRY, UNSPECIFIED: ICD-10-CM

## 2025-03-19 DIAGNOSIS — D50.9 IRON DEFICIENCY ANEMIA, UNSPECIFIED IRON DEFICIENCY ANEMIA TYPE: ICD-10-CM

## 2025-03-19 DIAGNOSIS — R53.83 OTHER FATIGUE: ICD-10-CM

## 2025-03-19 LAB
ALBUMIN SERPL-MCNC: 4.4 G/DL (ref 3.5–5.2)
ALBUMIN/GLOB SERPL: 1.8 {RATIO} (ref 1–2.5)
ALP SERPL-CCNC: 79 U/L (ref 35–104)
ALT SERPL-CCNC: 49 U/L (ref 10–35)
ANION GAP SERPL CALCULATED.3IONS-SCNC: 12 MMOL/L (ref 9–16)
AST SERPL-CCNC: 35 U/L (ref 10–35)
BILIRUB SERPL-MCNC: 0.3 MG/DL (ref 0–1.2)
BUN SERPL-MCNC: 18 MG/DL (ref 8–23)
CALCIUM SERPL-MCNC: 9.7 MG/DL (ref 8.6–10.4)
CHLORIDE SERPL-SCNC: 99 MMOL/L (ref 98–107)
CO2 SERPL-SCNC: 27 MMOL/L (ref 20–31)
CREAT SERPL-MCNC: 0.9 MG/DL (ref 0.6–0.9)
ERYTHROCYTE [DISTWIDTH] IN BLOOD BY AUTOMATED COUNT: 13.1 % (ref 11.8–14.4)
FERRITIN SERPL-MCNC: 73 NG/ML
GFR, ESTIMATED: 69 ML/MIN/1.73M2
GLUCOSE SERPL-MCNC: 88 MG/DL (ref 74–99)
HCT VFR BLD AUTO: 39.2 % (ref 36.3–47.1)
HGB BLD-MCNC: 12.7 G/DL (ref 11.9–15.1)
IRON SATN MFR SERPL: 34 % (ref 20–55)
IRON SERPL-MCNC: 100 UG/DL (ref 37–145)
MCH RBC QN AUTO: 28.2 PG (ref 25.2–33.5)
MCHC RBC AUTO-ENTMCNC: 32.4 G/DL (ref 28.4–34.8)
MCV RBC AUTO: 87.1 FL (ref 82.6–102.9)
NRBC BLD-RTO: 0 PER 100 WBC
PLATELET # BLD AUTO: 228 K/UL (ref 138–453)
PMV BLD AUTO: 11.5 FL (ref 8.1–13.5)
POTASSIUM SERPL-SCNC: 4 MMOL/L (ref 3.7–5.3)
PROT SERPL-MCNC: 6.9 G/DL (ref 6.6–8.7)
RBC # BLD AUTO: 4.5 M/UL (ref 3.95–5.11)
SODIUM SERPL-SCNC: 138 MMOL/L (ref 136–145)
TIBC SERPL-MCNC: 293 UG/DL (ref 250–450)
UNSATURATED IRON BINDING CAPACITY: 193 UG/DL (ref 112–347)
WBC OTHER # BLD: 6.5 K/UL (ref 3.5–11.3)

## 2025-03-20 ENCOUNTER — RESULTS FOLLOW-UP (OUTPATIENT)
Dept: LAB | Age: 70
End: 2025-03-20

## 2025-03-20 LAB
EST. AVERAGE GLUCOSE BLD GHB EST-MCNC: 117 MG/DL
HBA1C MFR BLD: 5.7 % (ref 4–6)

## 2025-05-01 ENCOUNTER — OFFICE VISIT (OUTPATIENT)
Dept: PULMONOLOGY | Age: 70
End: 2025-05-01
Payer: MEDICARE

## 2025-05-01 VITALS
WEIGHT: 155.1 LBS | DIASTOLIC BLOOD PRESSURE: 65 MMHG | SYSTOLIC BLOOD PRESSURE: 118 MMHG | HEIGHT: 60 IN | TEMPERATURE: 97.8 F | OXYGEN SATURATION: 98 % | BODY MASS INDEX: 30.45 KG/M2 | HEART RATE: 59 BPM | RESPIRATION RATE: 16 BRPM

## 2025-05-01 DIAGNOSIS — G47.33 OSA ON CPAP: Primary | ICD-10-CM

## 2025-05-01 DIAGNOSIS — R06.00 DYSPNEA, UNSPECIFIED TYPE: ICD-10-CM

## 2025-05-01 DIAGNOSIS — J45.909 UNCOMPLICATED ASTHMA, UNSPECIFIED ASTHMA SEVERITY, UNSPECIFIED WHETHER PERSISTENT: ICD-10-CM

## 2025-05-01 DIAGNOSIS — K21.9 GASTROESOPHAGEAL REFLUX DISEASE, UNSPECIFIED WHETHER ESOPHAGITIS PRESENT: ICD-10-CM

## 2025-05-01 PROCEDURE — G8417 CALC BMI ABV UP PARAM F/U: HCPCS | Performed by: INTERNAL MEDICINE

## 2025-05-01 PROCEDURE — 1036F TOBACCO NON-USER: CPT | Performed by: INTERNAL MEDICINE

## 2025-05-01 PROCEDURE — G8427 DOCREV CUR MEDS BY ELIG CLIN: HCPCS | Performed by: INTERNAL MEDICINE

## 2025-05-01 PROCEDURE — 3078F DIAST BP <80 MM HG: CPT | Performed by: INTERNAL MEDICINE

## 2025-05-01 PROCEDURE — 1159F MED LIST DOCD IN RCRD: CPT | Performed by: INTERNAL MEDICINE

## 2025-05-01 PROCEDURE — 1126F AMNT PAIN NOTED NONE PRSNT: CPT | Performed by: INTERNAL MEDICINE

## 2025-05-01 PROCEDURE — 3017F COLORECTAL CA SCREEN DOC REV: CPT | Performed by: INTERNAL MEDICINE

## 2025-05-01 PROCEDURE — 1123F ACP DISCUSS/DSCN MKR DOCD: CPT | Performed by: INTERNAL MEDICINE

## 2025-05-01 PROCEDURE — 3074F SYST BP LT 130 MM HG: CPT | Performed by: INTERNAL MEDICINE

## 2025-05-01 PROCEDURE — G8399 PT W/DXA RESULTS DOCUMENT: HCPCS | Performed by: INTERNAL MEDICINE

## 2025-05-01 PROCEDURE — 1090F PRES/ABSN URINE INCON ASSESS: CPT | Performed by: INTERNAL MEDICINE

## 2025-05-01 PROCEDURE — 99214 OFFICE O/P EST MOD 30 MIN: CPT | Performed by: INTERNAL MEDICINE

## 2025-05-01 NOTE — PROGRESS NOTES
on 11/14/2024 and it showed only mild dependent basilar atelectasis, otherwise no consolidation or infiltrate was seen on CT scan review.       2. Obstructive sleep apnea.  Despite an increase in CPAP pressure to 11 cm, she continues to experience apneas with an AHI of 6.5. An order for auto CPAP will be placed, and if her current machine does not support this feature, the pressure will be increased from 11 to 12 cm. If she continues to experience fatigue or unrefreshing sleep, another sleep study may be considered for further CPAP adjustment.    3. Gastroesophageal reflux disease (GERD).  Her acid reflux may be contributing to her asthma symptoms, including coughing and wheezing. She is currently taking omeprazole daily, which controls her symptoms. She is advised to continue this medication and avoid late-night eating and heavy meals to prevent exacerbation of GERD symptoms.    4. Health maintenance.  She is up to date on her pneumonia vaccine. She does not receive annual influenza or COVID-19 vaccines.    Follow-up  The patient will follow up in 6 months.            It was my pleasure to evaluate Fany Olmedo today.  Please call with questions.      Please note that this chart was generated using voice recognition Dragon dictation software. Although every effort was made to ensure the accuracy of this automated transcription, some errors in transcription may have occurred.      The patient (or guardian, if applicable) and other individuals in attendance with the patient were advised that Artificial Intelligence will be utilized during this visit to record, process the conversation to generate a clinical note, and support improvement of the AI technology. The patient (or guardian, if applicable) and other individuals in attendance at the appointment consented to the use of AI, including the recording.                Roman Maldonado MD, MD             5/1/2025, 3:59 PM

## 2025-05-01 NOTE — PATIENT INSTRUCTIONS
SURVEY:    Thank you for allowing us to care for you today.    You may be receiving a survey from [facility] regarding your visit today- electronically or via mail.      Please help us by completing the survey as this will provide the needed feedback to ensure we are providing the very best care for you and your family.    If you cannot score us a very good on any question, please call the office to discuss how we could have made your experience a very good one.    Thank you.       STAFF:    Karen Friedman, Meli DENTON      CLINICAL STAFF:    Tiffany LUX, Connie DENTON, Juliana DENTON, Sharifa LUX

## 2025-05-02 ENCOUNTER — TELEPHONE (OUTPATIENT)
Dept: PULMONOLOGY | Age: 70
End: 2025-05-02

## 2025-05-02 DIAGNOSIS — G47.33 OSA ON CPAP: Primary | ICD-10-CM

## 2025-05-02 NOTE — TELEPHONE ENCOUNTER
We received a fax from [facility] stating that Fany is not eligible for a new unit until 1/20/26. Her current unit does not have the auto capability. They can do a pressure change with a cpap pressure only. We will have to send office note from 5/1/25 along with a new order. Please advise. (Fax if scanned into media)

## (undated) DEVICE — MEDI-VAC NON-CONDUCTIVE TUBING7MM X 30.5 (100FT): Brand: CARDINAL HEALTH

## (undated) DEVICE — TRAP SPEC RETRV CLR PLAS POLYP IN LN SUCT QUIK CTCH

## (undated) DEVICE — SNARE ENDOSCP L240CM W15MM SHTH DIA2.4MM CHN 2.8MM STIFF

## (undated) DEVICE — DISPOSABLE DISTAL ATTACHMENT: Brand: DISPOSABLE DISTAL ATTACHMENT

## (undated) DEVICE — SINGLE-USE POLYPECTOMY SNARE: Brand: CAPTIFLEX

## (undated) DEVICE — ACUSNARE POLYPECTOMY SNARE: Brand: ACUSNARE

## (undated) DEVICE — Device: Brand: DISPOSABLE ELECTROSURGICAL SNARE

## (undated) DEVICE — ELECTRODE PT RET AD L9FT HI MOIST COND ADH HYDRGEL CORDED

## (undated) DEVICE — CANNULA ORAL NSL AD CO2 N INTUB O2 DEL DISP TRU LNK

## (undated) DEVICE — NEEDLE SCLERO 25GA L240CM OD0.51MM ID0.24MM EXTN L4MM SHTH

## (undated) DEVICE — SINGLE-USE BIOPSY FORCEPS: Brand: RADIAL JAW 4

## (undated) DEVICE — ELEVIEW SUBMUCOSAL INJECTABLE COMPOSITION 10ML

## (undated) DEVICE — TRAP SURG QUAD PARABOLA SLOT DSGN SFTY SCRN TRAPEASE

## (undated) DEVICE — SOLUTION IRRIG 1000ML 0.9% SOD CHL USP POUR PLAS BTL

## (undated) DEVICE — TUBING SUCT NON-STRL 9/32X100 W/CNNT